# Patient Record
Sex: FEMALE | Race: WHITE | NOT HISPANIC OR LATINO | Employment: FULL TIME | URBAN - METROPOLITAN AREA
[De-identification: names, ages, dates, MRNs, and addresses within clinical notes are randomized per-mention and may not be internally consistent; named-entity substitution may affect disease eponyms.]

---

## 2023-07-26 PROBLEM — M54.2 CERVICAL PAIN: Status: ACTIVE | Noted: 2023-07-26

## 2023-07-26 PROBLEM — F17.200 CURRENT SMOKER: Status: ACTIVE | Noted: 2023-07-26

## 2023-07-26 PROBLEM — M47.812 CERVICAL ARTHRITIS: Status: ACTIVE | Noted: 2023-07-26

## 2023-07-26 PROBLEM — M54.12 ACUTE CERVICAL RADICULOPATHY: Status: ACTIVE | Noted: 2023-07-26

## 2023-07-26 PROBLEM — K11.8 MASS OF BOTH PAROTID GLANDS: Status: ACTIVE | Noted: 2023-07-26

## 2023-07-26 PROBLEM — D11.9 WARTHIN TUMOR: Status: ACTIVE | Noted: 2023-07-26

## 2023-07-26 NOTE — PROGRESS NOTES
Subjective   Patient ID: Tracey Carranza is a 57 y.o. female who presents for Establish Care (New pt).  HPI    Np   Ra   Hypothyroid, hld and gerd.     Review of Systems   Constitutional: Negative.    HENT: Negative.     Eyes: Negative.    Respiratory: Negative.     Cardiovascular: Negative.    Gastrointestinal: Negative.    Endocrine: Negative.    Musculoskeletal: Negative.    Skin: Negative.    Neurological: Negative.    Hematological: Negative.    Psychiatric/Behavioral: Negative.     All other systems reviewed and are negative.      Objective   Physical Exam  Vitals and nursing note reviewed.   Constitutional:       Appearance: Normal appearance.   HENT:      Head: Normocephalic and atraumatic.      Nose: Nose normal.   Eyes:      Conjunctiva/sclera: Conjunctivae normal.   Cardiovascular:      Rate and Rhythm: Normal rate and regular rhythm.   Pulmonary:      Effort: Pulmonary effort is normal.   Skin:     General: Skin is dry.   Neurological:      General: No focal deficit present.      Mental Status: She is alert and oriented to person, place, and time. Mental status is at baseline.   Psychiatric:         Mood and Affect: Mood normal.         Behavior: Behavior normal.         Assessment/Plan        Np  estPatient was identified as a fall risk. Risk prevention instructions provided.

## 2023-07-27 ENCOUNTER — OFFICE VISIT (OUTPATIENT)
Dept: PRIMARY CARE | Facility: CLINIC | Age: 58
End: 2023-07-27
Payer: MEDICARE

## 2023-07-27 VITALS
OXYGEN SATURATION: 97 % | BODY MASS INDEX: 36.09 KG/M2 | SYSTOLIC BLOOD PRESSURE: 156 MMHG | DIASTOLIC BLOOD PRESSURE: 77 MMHG | HEART RATE: 74 BPM | WEIGHT: 216.6 LBS | HEIGHT: 65 IN

## 2023-07-27 DIAGNOSIS — Z12.31 ENCOUNTER FOR SCREENING MAMMOGRAM FOR MALIGNANT NEOPLASM OF BREAST: ICD-10-CM

## 2023-07-27 DIAGNOSIS — Z13.6 SCREENING FOR CARDIOVASCULAR CONDITION: ICD-10-CM

## 2023-07-27 DIAGNOSIS — M04.9 AUTOINFLAMMATORY SYNDROME, UNSPECIFIED (MULTI): ICD-10-CM

## 2023-07-27 DIAGNOSIS — Z13.21 ENCOUNTER FOR VITAMIN DEFICIENCY SCREENING: ICD-10-CM

## 2023-07-27 DIAGNOSIS — Z00.00 HEALTHCARE MAINTENANCE: ICD-10-CM

## 2023-07-27 DIAGNOSIS — E03.9 HYPOTHYROIDISM, UNSPECIFIED TYPE: ICD-10-CM

## 2023-07-27 DIAGNOSIS — M06.9 RHEUMATOID ARTHRITIS, INVOLVING UNSPECIFIED SITE, UNSPECIFIED WHETHER RHEUMATOID FACTOR PRESENT (MULTI): Primary | ICD-10-CM

## 2023-07-27 DIAGNOSIS — E78.5 HYPERLIPIDEMIA, UNSPECIFIED HYPERLIPIDEMIA TYPE: ICD-10-CM

## 2023-07-27 PROBLEM — M72.2 PLANTAR FASCIITIS OF LEFT FOOT: Status: ACTIVE | Noted: 2019-02-24

## 2023-07-27 PROBLEM — K21.9 ESOPHAGEAL REFLUX: Status: ACTIVE | Noted: 2023-07-27

## 2023-07-27 PROBLEM — G57.11 MERALGIA PARESTHETICA OF RIGHT SIDE: Status: ACTIVE | Noted: 2019-03-13

## 2023-07-27 PROBLEM — E07.9 THYROID DISEASE: Status: ACTIVE | Noted: 2023-07-27

## 2023-07-27 PROCEDURE — 3008F BODY MASS INDEX DOCD: CPT | Performed by: INTERNAL MEDICINE

## 2023-07-27 PROCEDURE — 99204 OFFICE O/P NEW MOD 45 MIN: CPT | Performed by: INTERNAL MEDICINE

## 2023-07-27 RX ORDER — AMITRIPTYLINE HYDROCHLORIDE 10 MG/1
50 TABLET, FILM COATED ORAL NIGHTLY
Qty: 9 TABLET | Refills: 0
Start: 2023-07-27 | End: 2024-01-17 | Stop reason: WASHOUT

## 2023-07-27 RX ORDER — VIT C/E/ZN/COPPR/LUTEIN/ZEAXAN 250MG-90MG
25 CAPSULE ORAL DAILY
Qty: 30 CAPSULE | Refills: 11 | Status: SHIPPED | OUTPATIENT
Start: 2023-07-27 | End: 2023-07-27 | Stop reason: ALTCHOICE

## 2023-07-27 RX ORDER — ACETAMINOPHEN 500 MG
TABLET ORAL EVERY 6 HOURS PRN
COMMUNITY
End: 2024-02-13 | Stop reason: HOSPADM

## 2023-07-27 RX ORDER — PANTOPRAZOLE SODIUM 40 MG/1
1 TABLET, DELAYED RELEASE ORAL DAILY
COMMUNITY
End: 2023-07-27 | Stop reason: ALTCHOICE

## 2023-07-27 RX ORDER — PANTOPRAZOLE SODIUM 40 MG/1
40 TABLET, DELAYED RELEASE ORAL DAILY
COMMUNITY
End: 2023-07-27 | Stop reason: ALTCHOICE

## 2023-07-27 RX ORDER — SIMVASTATIN 40 MG/1
1 TABLET, FILM COATED ORAL DAILY
COMMUNITY
Start: 2007-05-07 | End: 2023-07-27 | Stop reason: ALTCHOICE

## 2023-07-27 RX ORDER — LEVOTHYROXINE SODIUM 88 UG/1
88 TABLET ORAL
Qty: 90 TABLET | Refills: 0 | Status: SHIPPED | OUTPATIENT
Start: 2023-07-27 | End: 2023-10-27 | Stop reason: SDUPTHER

## 2023-07-27 RX ORDER — LEVOTHYROXINE SODIUM 88 UG/1
TABLET ORAL EVERY 24 HOURS
COMMUNITY
End: 2023-07-27 | Stop reason: SDUPTHER

## 2023-07-27 RX ORDER — FOLIC ACID 1 MG/1
1 TABLET ORAL DAILY
COMMUNITY

## 2023-07-27 RX ORDER — AMITRIPTYLINE HYDROCHLORIDE 10 MG/1
TABLET, FILM COATED ORAL
COMMUNITY
End: 2023-07-27 | Stop reason: ALTCHOICE

## 2023-07-27 RX ORDER — CELECOXIB 200 MG/1
200 CAPSULE ORAL
COMMUNITY
Start: 2023-05-31 | End: 2024-01-22 | Stop reason: WASHOUT

## 2023-07-27 RX ORDER — ATORVASTATIN CALCIUM 40 MG/1
40 TABLET, FILM COATED ORAL DAILY
Qty: 90 TABLET | Refills: 1 | Status: SHIPPED | OUTPATIENT
Start: 2023-07-27 | End: 2023-10-27 | Stop reason: SDUPTHER

## 2023-07-27 RX ORDER — PANTOPRAZOLE SODIUM 40 MG/1
40 TABLET, DELAYED RELEASE ORAL DAILY
Qty: 90 TABLET | Refills: 0 | Status: SHIPPED | OUTPATIENT
Start: 2023-07-27 | End: 2023-09-21 | Stop reason: SDUPTHER

## 2023-07-27 RX ORDER — CHOLECALCIFEROL (VITAMIN D3) 50 MCG
100 TABLET ORAL DAILY
Qty: 60 TABLET | Refills: 11 | Status: SHIPPED | OUTPATIENT
Start: 2023-07-27 | End: 2024-07-26

## 2023-07-27 RX ORDER — METHOTREXATE 2.5 MG/1
2.5 TABLET ORAL
COMMUNITY

## 2023-07-27 ASSESSMENT — LIFESTYLE VARIABLES
HOW OFTEN DO YOU HAVE A DRINK CONTAINING ALCOHOL: MONTHLY OR LESS
HOW OFTEN DO YOU HAVE SIX OR MORE DRINKS ON ONE OCCASION: LESS THAN MONTHLY
HOW MANY STANDARD DRINKS CONTAINING ALCOHOL DO YOU HAVE ON A TYPICAL DAY: 1 OR 2
AUDIT-C TOTAL SCORE: 2
SKIP TO QUESTIONS 9-10: 0

## 2023-07-27 ASSESSMENT — ENCOUNTER SYMPTOMS
MUSCULOSKELETAL NEGATIVE: 1
EYES NEGATIVE: 1
CARDIOVASCULAR NEGATIVE: 1
RESPIRATORY NEGATIVE: 1
OCCASIONAL FEELINGS OF UNSTEADINESS: 0
GASTROINTESTINAL NEGATIVE: 1
ENDOCRINE NEGATIVE: 1
CONSTITUTIONAL NEGATIVE: 1
PSYCHIATRIC NEGATIVE: 1
LOSS OF SENSATION IN FEET: 1
NEUROLOGICAL NEGATIVE: 1
HEMATOLOGIC/LYMPHATIC NEGATIVE: 1
DEPRESSION: 1

## 2023-07-27 ASSESSMENT — PATIENT HEALTH QUESTIONNAIRE - PHQ9
SUM OF ALL RESPONSES TO PHQ9 QUESTIONS 1 AND 2: 2
1. LITTLE INTEREST OR PLEASURE IN DOING THINGS: SEVERAL DAYS
10. IF YOU CHECKED OFF ANY PROBLEMS, HOW DIFFICULT HAVE THESE PROBLEMS MADE IT FOR YOU TO DO YOUR WORK, TAKE CARE OF THINGS AT HOME, OR GET ALONG WITH OTHER PEOPLE: NOT DIFFICULT AT ALL
2. FEELING DOWN, DEPRESSED OR HOPELESS: SEVERAL DAYS

## 2023-07-27 NOTE — PATIENT INSTRUCTIONS

## 2023-09-21 DIAGNOSIS — E03.9 HYPOTHYROIDISM, UNSPECIFIED TYPE: ICD-10-CM

## 2023-09-21 DIAGNOSIS — Z00.00 HEALTHCARE MAINTENANCE: ICD-10-CM

## 2023-09-21 RX ORDER — PANTOPRAZOLE SODIUM 40 MG/1
40 TABLET, DELAYED RELEASE ORAL DAILY
Qty: 90 TABLET | Refills: 0 | Status: SHIPPED | OUTPATIENT
Start: 2023-09-21 | End: 2023-09-22 | Stop reason: SDUPTHER

## 2023-09-22 DIAGNOSIS — E03.9 HYPOTHYROIDISM, UNSPECIFIED TYPE: ICD-10-CM

## 2023-09-22 DIAGNOSIS — K21.9 GASTROESOPHAGEAL REFLUX DISEASE WITHOUT ESOPHAGITIS: Primary | ICD-10-CM

## 2023-09-22 DIAGNOSIS — Z00.00 HEALTHCARE MAINTENANCE: ICD-10-CM

## 2023-09-22 RX ORDER — PANTOPRAZOLE SODIUM 40 MG/1
40 TABLET, DELAYED RELEASE ORAL DAILY
Qty: 90 TABLET | Refills: 1 | Status: SHIPPED | OUTPATIENT
Start: 2023-09-22

## 2023-10-26 NOTE — PROGRESS NOTES
"Awv  rev  lab  Need mamm dexa    Subjective   Patient ID: Tracey Carranza is a 58 y.o. female who presents for AWV (AWV, ).  HPI    Awv  needs lab  mamm  dexa    Sinus inf   vertigo  and urinary symptoms recent.  No culture. She took left over pcn for teeth.     Ra sees   rheum  ccf.    Daily  smoking     Refuse mammo.  Refuse vaccines.       Below is the patient's most recent value for Albumin, ALT, AST, BUN, Calcium, Chloride, Cholesterol, CO2, Creatinine, GFR, Glucose, HDL, Hematocrit, Hemoglobin, Hemoglobin A1C, LDL, Magnesium, Phosphorus, Platelets, Potassium, PSA, Sodium, Triglycerides, and WBC.   Lab Results   Component Value Date    ALBUMIN 4.1 08/29/2022    ALT 11 08/29/2022    AST 11 08/29/2022    BUN 9 01/04/2023    CALCIUM 9.7 01/04/2023     01/04/2023    CHOL 176 02/11/2023    CO2 27 01/04/2023    CREATININE 0.86 01/04/2023    HDL 43 (L) 02/11/2023    HCT 40.8 01/04/2023    HGB 13.3 01/04/2023    MG 2.0 06/09/2022     01/04/2023    K 4.7 01/04/2023     01/04/2023    TRIG 108 02/11/2023    WBC 6.9 01/04/2023     Note: for a comprehensive list of the patient's lab results, access the Results Review activity.  Review of Systems   Constitutional: Negative.    All other systems reviewed and are negative.      Objective   BP Readings from Last 3 Encounters:   10/27/23 138/76   07/27/23 156/77   02/09/23 126/72      Wt Readings from Last 3 Encounters:   10/27/23 93.4 kg (206 lb)   07/27/23 98.2 kg (216 lb 9.6 oz)   02/09/23 102 kg (224 lb)      BMI: Estimated body mass index is 34.28 kg/m² as calculated from the following:    Height as of this encounter: 1.651 m (5' 5\").    Weight as of this encounter: 93.4 kg (206 lb).    BSA: Estimated body surface area is 2.07 meters squared as calculated from the following:    Height as of this encounter: 1.651 m (5' 5\").    Weight as of this encounter: 93.4 kg (206 lb).  Physical Exam  Vitals and nursing note reviewed.   Constitutional:       " Appearance: Normal appearance.   HENT:      Head: Normocephalic and atraumatic.      Nose: Nose normal.   Eyes:      Conjunctiva/sclera: Conjunctivae normal.   Cardiovascular:      Rate and Rhythm: Normal rate and regular rhythm.   Pulmonary:      Effort: Pulmonary effort is normal.   Skin:     General: Skin is dry.   Neurological:      General: No focal deficit present.      Mental Status: She is alert and oriented to person, place, and time. Mental status is at baseline.   Psychiatric:         Mood and Affect: Mood normal.         Behavior: Behavior normal.         Assessment/Plan   Problem List Items Addressed This Visit             ICD-10-CM       Medium    Lumbosacral spondylosis without myelopathy M47.817    Relevant Orders    Disability Placard    RA (rheumatoid arthritis) (CMS/[facility]) M06.9    Relevant Orders    Disability Placard     Other Visit Diagnoses         Codes    Routine general medical examination at health care facility    -  Primary Z00.00    Relevant Orders    1 Year Follow Up In Primary Care - Wellness Exam    IGT (impaired glucose tolerance)     R73.02    Relevant Orders    Hemoglobin A1C    Class 1 obesity due to excess calories with serious comorbidity and body mass index (BMI) of 34.0 to 34.9 in adult     E66.09, Z68.34    Screening for multiple conditions     Z13.89    Diabetes mellitus screening     Z13.1    Relevant Orders    Hemoglobin A1C    Screening for cardiovascular condition     Z13.6    Relevant Orders    Lipid panel    CT cardiac scoring wo IV contrast    Personal history of tobacco use, presenting hazards to health     Z87.891    Relevant Orders    CT lung screening low dose    Asymptomatic menopausal state     Z78.0    Relevant Orders    XR DEXA bone density    Encounter for screening mammogram for breast cancer     Z12.31    Relevant Orders    BI mammo bilateral screening tomosynthesis    Need for hepatitis C screening test     Z11.59    Relevant Orders    Hepatitis C antibody     Routine general medical examination at a health care facility     Z00.00    Screening for colorectal cancer     Z12.11, Z12.12    Relevant Orders    Cologuard® colon cancer screening    Hyperlipidemia, unspecified hyperlipidemia type     E78.5    Relevant Medications    atorvastatin (Lipitor) 40 mg tablet    Dysuria     R30.0    Relevant Orders    Urine Culture    Mammogram declined     Z53.20    Vaccination declined     Z28.21    Sinusitis, unspecified chronicity, unspecified location     J32.9    Relevant Medications    azithromycin (Zithromax) 250 mg tablet    Hypothyroidism, unspecified type     E03.9    Relevant Medications    levothyroxine (Synthroid, Levoxyl) 88 mcg tablet    Other Relevant Orders    T4, free

## 2023-10-27 ENCOUNTER — LAB (OUTPATIENT)
Dept: LAB | Facility: LAB | Age: 58
End: 2023-10-27
Payer: MEDICARE

## 2023-10-27 ENCOUNTER — OFFICE VISIT (OUTPATIENT)
Dept: PRIMARY CARE | Facility: CLINIC | Age: 58
End: 2023-10-27
Payer: MEDICARE

## 2023-10-27 VITALS
DIASTOLIC BLOOD PRESSURE: 76 MMHG | WEIGHT: 206 LBS | HEIGHT: 65 IN | HEART RATE: 91 BPM | BODY MASS INDEX: 34.32 KG/M2 | OXYGEN SATURATION: 98 % | SYSTOLIC BLOOD PRESSURE: 138 MMHG

## 2023-10-27 DIAGNOSIS — E03.9 HYPOTHYROIDISM, UNSPECIFIED TYPE: ICD-10-CM

## 2023-10-27 DIAGNOSIS — R73.02 IGT (IMPAIRED GLUCOSE TOLERANCE): ICD-10-CM

## 2023-10-27 DIAGNOSIS — Z13.1 DIABETES MELLITUS SCREENING: ICD-10-CM

## 2023-10-27 DIAGNOSIS — Z28.21 VACCINATION DECLINED: ICD-10-CM

## 2023-10-27 DIAGNOSIS — Z13.89 SCREENING FOR MULTIPLE CONDITIONS: ICD-10-CM

## 2023-10-27 DIAGNOSIS — Z13.6 SCREENING FOR CARDIOVASCULAR CONDITION: ICD-10-CM

## 2023-10-27 DIAGNOSIS — Z87.891 PERSONAL HISTORY OF TOBACCO USE, PRESENTING HAZARDS TO HEALTH: ICD-10-CM

## 2023-10-27 DIAGNOSIS — Z12.11 SCREENING FOR COLORECTAL CANCER: ICD-10-CM

## 2023-10-27 DIAGNOSIS — R30.0 DYSURIA: ICD-10-CM

## 2023-10-27 DIAGNOSIS — Z00.00 ROUTINE GENERAL MEDICAL EXAMINATION AT HEALTH CARE FACILITY: Primary | ICD-10-CM

## 2023-10-27 DIAGNOSIS — Z00.00 ROUTINE GENERAL MEDICAL EXAMINATION AT A HEALTH CARE FACILITY: ICD-10-CM

## 2023-10-27 DIAGNOSIS — Z12.12 SCREENING FOR COLORECTAL CANCER: ICD-10-CM

## 2023-10-27 DIAGNOSIS — Z53.20 MAMMOGRAM DECLINED: ICD-10-CM

## 2023-10-27 DIAGNOSIS — E78.5 HYPERLIPIDEMIA, UNSPECIFIED HYPERLIPIDEMIA TYPE: ICD-10-CM

## 2023-10-27 DIAGNOSIS — J32.9 SINUSITIS, UNSPECIFIED CHRONICITY, UNSPECIFIED LOCATION: ICD-10-CM

## 2023-10-27 DIAGNOSIS — Z11.59 NEED FOR HEPATITIS C SCREENING TEST: ICD-10-CM

## 2023-10-27 DIAGNOSIS — Z78.0 ASYMPTOMATIC MENOPAUSAL STATE: ICD-10-CM

## 2023-10-27 DIAGNOSIS — M47.817 LUMBOSACRAL SPONDYLOSIS WITHOUT MYELOPATHY: ICD-10-CM

## 2023-10-27 DIAGNOSIS — Z12.31 ENCOUNTER FOR SCREENING MAMMOGRAM FOR BREAST CANCER: ICD-10-CM

## 2023-10-27 DIAGNOSIS — E66.09 CLASS 1 OBESITY DUE TO EXCESS CALORIES WITH SERIOUS COMORBIDITY AND BODY MASS INDEX (BMI) OF 34.0 TO 34.9 IN ADULT: ICD-10-CM

## 2023-10-27 DIAGNOSIS — M06.9 RHEUMATOID ARTHRITIS, INVOLVING UNSPECIFIED SITE, UNSPECIFIED WHETHER RHEUMATOID FACTOR PRESENT (MULTI): ICD-10-CM

## 2023-10-27 LAB
CHOLEST SERPL-MCNC: 161 MG/DL (ref 0–199)
CHOLESTEROL/HDL RATIO: 3.9
EST. AVERAGE GLUCOSE BLD GHB EST-MCNC: 114 MG/DL
HBA1C MFR BLD: 5.6 %
HCV AB SER QL: NONREACTIVE
HDLC SERPL-MCNC: 41.3 MG/DL
LDLC SERPL CALC-MCNC: 98 MG/DL
NON HDL CHOLESTEROL: 120 MG/DL (ref 0–149)
T4 FREE SERPL-MCNC: 1.39 NG/DL (ref 0.78–1.48)
TRIGL SERPL-MCNC: 107 MG/DL (ref 0–149)
VLDL: 21 MG/DL (ref 0–40)

## 2023-10-27 PROCEDURE — 80061 LIPID PANEL: CPT

## 2023-10-27 PROCEDURE — 83036 HEMOGLOBIN GLYCOSYLATED A1C: CPT

## 2023-10-27 PROCEDURE — 4004F PT TOBACCO SCREEN RCVD TLK: CPT | Performed by: INTERNAL MEDICINE

## 2023-10-27 PROCEDURE — 84439 ASSAY OF FREE THYROXINE: CPT

## 2023-10-27 PROCEDURE — G0439 PPPS, SUBSEQ VISIT: HCPCS | Performed by: INTERNAL MEDICINE

## 2023-10-27 PROCEDURE — 99396 PREV VISIT EST AGE 40-64: CPT | Performed by: INTERNAL MEDICINE

## 2023-10-27 PROCEDURE — 3008F BODY MASS INDEX DOCD: CPT | Performed by: INTERNAL MEDICINE

## 2023-10-27 PROCEDURE — 86803 HEPATITIS C AB TEST: CPT

## 2023-10-27 PROCEDURE — 36415 COLL VENOUS BLD VENIPUNCTURE: CPT

## 2023-10-27 RX ORDER — AZITHROMYCIN 250 MG/1
TABLET, FILM COATED ORAL
Qty: 6 TABLET | Refills: 0 | Status: SHIPPED | OUTPATIENT
Start: 2023-10-27 | End: 2023-10-31

## 2023-10-27 RX ORDER — ATORVASTATIN CALCIUM 40 MG/1
40 TABLET, FILM COATED ORAL DAILY
Qty: 90 TABLET | Refills: 1 | Status: SHIPPED | OUTPATIENT
Start: 2023-10-27 | End: 2024-04-24

## 2023-10-27 RX ORDER — LEVOTHYROXINE SODIUM 88 UG/1
88 TABLET ORAL
Qty: 90 TABLET | Refills: 3 | Status: SHIPPED | OUTPATIENT
Start: 2023-10-27

## 2023-10-27 ASSESSMENT — ACTIVITIES OF DAILY LIVING (ADL)
MANAGING_FINANCES: INDEPENDENT
DOING_HOUSEWORK: INDEPENDENT
GROCERY_SHOPPING: INDEPENDENT
BATHING: INDEPENDENT
DRESSING: INDEPENDENT
TAKING_MEDICATION: INDEPENDENT

## 2023-10-27 ASSESSMENT — PATIENT HEALTH QUESTIONNAIRE - PHQ9
2. FEELING DOWN, DEPRESSED OR HOPELESS: NOT AT ALL
1. LITTLE INTEREST OR PLEASURE IN DOING THINGS: NOT AT ALL
SUM OF ALL RESPONSES TO PHQ9 QUESTIONS 1 AND 2: 0

## 2023-10-27 ASSESSMENT — ENCOUNTER SYMPTOMS
CONSTITUTIONAL NEGATIVE: 1
DEPRESSION: 0

## 2023-11-16 ENCOUNTER — ANCILLARY PROCEDURE (OUTPATIENT)
Dept: RADIOLOGY | Facility: CLINIC | Age: 58
End: 2023-11-16
Payer: MEDICARE

## 2023-11-16 DIAGNOSIS — Z78.0 ASYMPTOMATIC MENOPAUSAL STATE: ICD-10-CM

## 2023-11-16 DIAGNOSIS — Z87.891 PERSONAL HISTORY OF TOBACCO USE, PRESENTING HAZARDS TO HEALTH: ICD-10-CM

## 2023-11-16 PROCEDURE — 71271 CT THORAX LUNG CANCER SCR C-: CPT

## 2023-11-16 PROCEDURE — 77080 DXA BONE DENSITY AXIAL: CPT

## 2023-11-16 PROCEDURE — 77080 DXA BONE DENSITY AXIAL: CPT | Performed by: RADIOLOGY

## 2023-11-16 PROCEDURE — 71271 CT THORAX LUNG CANCER SCR C-: CPT | Performed by: RADIOLOGY

## 2023-11-17 LAB — NONINV COLON CA DNA+OCC BLD SCRN STL QL: POSITIVE

## 2023-11-27 DIAGNOSIS — Z12.11 COLON CANCER SCREENING: Primary | ICD-10-CM

## 2023-11-27 RX ORDER — POLYETHYLENE GLYCOL 3350, SODIUM CHLORIDE, SODIUM BICARBONATE AND POTASSIUM CHLORIDE 420; 5.72; 11.2; 1.48 G/4L; G/4L; G/4L; G/4L
4000 POWDER, FOR SOLUTION ORAL ONCE
Qty: 4000 ML | Refills: 0 | Status: SHIPPED | OUTPATIENT
Start: 2023-11-27 | End: 2023-11-27

## 2023-12-08 DIAGNOSIS — Z12.11 COLON CANCER SCREENING: Primary | ICD-10-CM

## 2023-12-08 RX ORDER — SOD SULF/POT CHLORIDE/MAG SULF 1.479 G
12 TABLET ORAL
Qty: 24 TABLET | Refills: 0 | Status: SHIPPED | OUTPATIENT
Start: 2023-12-08 | End: 2023-12-10

## 2023-12-13 ENCOUNTER — ANCILLARY PROCEDURE (OUTPATIENT)
Dept: RADIOLOGY | Facility: CLINIC | Age: 58
End: 2023-12-13
Payer: MEDICARE

## 2023-12-13 DIAGNOSIS — Z13.6 SCREENING FOR CARDIOVASCULAR CONDITION: ICD-10-CM

## 2023-12-13 PROCEDURE — 75571 CT HRT W/O DYE W/CA TEST: CPT

## 2023-12-19 NOTE — H&P
History Of Present Illness  Tracey Carranza is a 58 y.o. female presenting with positive cologuard.     Past Medical History  No past medical history on file.    Surgical History  No past surgical history on file.     Social History  She reports that she has been smoking cigarettes. She has never used smokeless tobacco. She reports current alcohol use. She reports that she does not currently use drugs.    Family History  No family history on file.     Allergies  Hydroxychloroquine    Review of Systems     Physical Exam  Vitals reviewed.   Cardiovascular:      Rate and Rhythm: Normal rate and regular rhythm.      Pulses: Normal pulses.      Heart sounds: Normal heart sounds.   Pulmonary:      Effort: Pulmonary effort is normal.      Breath sounds: Normal breath sounds.   Abdominal:      General: Abdomen is flat. Bowel sounds are normal.      Palpations: Abdomen is soft.          Last Recorded Vitals  There were no vitals taken for this visit.    Relevant Results              Assessment/Plan   Positive cologuard    Proceed with colonoscopy              Ricardo Schmitz MD

## 2023-12-20 ENCOUNTER — HOSPITAL ENCOUNTER (OUTPATIENT)
Dept: GASTROENTEROLOGY | Facility: HOSPITAL | Age: 58
Setting detail: OUTPATIENT SURGERY
Discharge: HOME | End: 2023-12-20
Payer: MEDICARE

## 2023-12-20 ENCOUNTER — ANESTHESIA EVENT (OUTPATIENT)
Dept: GASTROENTEROLOGY | Facility: HOSPITAL | Age: 58
End: 2023-12-20
Payer: MEDICARE

## 2023-12-20 ENCOUNTER — ANESTHESIA (OUTPATIENT)
Dept: GASTROENTEROLOGY | Facility: HOSPITAL | Age: 58
End: 2023-12-20
Payer: MEDICARE

## 2023-12-20 VITALS
BODY MASS INDEX: 32.88 KG/M2 | RESPIRATION RATE: 20 BRPM | SYSTOLIC BLOOD PRESSURE: 158 MMHG | HEART RATE: 72 BPM | OXYGEN SATURATION: 98 % | TEMPERATURE: 97.9 F | WEIGHT: 204.59 LBS | HEIGHT: 66 IN | DIASTOLIC BLOOD PRESSURE: 80 MMHG

## 2023-12-20 DIAGNOSIS — R19.5 POSITIVE COLORECTAL CANCER SCREENING USING COLOGUARD TEST: ICD-10-CM

## 2023-12-20 PROCEDURE — 3700000002 HC GENERAL ANESTHESIA TIME - EACH INCREMENTAL 1 MINUTE

## 2023-12-20 PROCEDURE — 2500000004 HC RX 250 GENERAL PHARMACY W/ HCPCS (ALT 636 FOR OP/ED): Performed by: ANESTHESIOLOGIST ASSISTANT

## 2023-12-20 PROCEDURE — 88341 IMHCHEM/IMCYTCHM EA ADD ANTB: CPT | Performed by: PATHOLOGY

## 2023-12-20 PROCEDURE — 88305 TISSUE EXAM BY PATHOLOGIST: CPT | Performed by: PATHOLOGY

## 2023-12-20 PROCEDURE — A45385 PR COLONOSCOPY,REMV LESN,SNARE: Performed by: STUDENT IN AN ORGANIZED HEALTH CARE EDUCATION/TRAINING PROGRAM

## 2023-12-20 PROCEDURE — 2500000004 HC RX 250 GENERAL PHARMACY W/ HCPCS (ALT 636 FOR OP/ED): Performed by: INTERNAL MEDICINE

## 2023-12-20 PROCEDURE — 88341 IMHCHEM/IMCYTCHM EA ADD ANTB: CPT | Mod: TC,AHULAB | Performed by: INTERNAL MEDICINE

## 2023-12-20 PROCEDURE — A45385 PR COLONOSCOPY,REMV LESN,SNARE: Performed by: ANESTHESIOLOGIST ASSISTANT

## 2023-12-20 PROCEDURE — 45385 COLONOSCOPY W/LESION REMOVAL: CPT | Performed by: INTERNAL MEDICINE

## 2023-12-20 PROCEDURE — 3700000001 HC GENERAL ANESTHESIA TIME - INITIAL BASE CHARGE

## 2023-12-20 PROCEDURE — 88342 IMHCHEM/IMCYTCHM 1ST ANTB: CPT | Performed by: PATHOLOGY

## 2023-12-20 PROCEDURE — 2500000005 HC RX 250 GENERAL PHARMACY W/O HCPCS: Performed by: ANESTHESIOLOGIST ASSISTANT

## 2023-12-20 PROCEDURE — 45380 COLONOSCOPY AND BIOPSY: CPT | Performed by: INTERNAL MEDICINE

## 2023-12-20 RX ORDER — SODIUM CHLORIDE, SODIUM LACTATE, POTASSIUM CHLORIDE, CALCIUM CHLORIDE 600; 310; 30; 20 MG/100ML; MG/100ML; MG/100ML; MG/100ML
100 INJECTION, SOLUTION INTRAVENOUS CONTINUOUS
Status: CANCELLED | OUTPATIENT
Start: 2023-12-20

## 2023-12-20 RX ORDER — ALBUTEROL SULFATE 0.83 MG/ML
2.5 SOLUTION RESPIRATORY (INHALATION) ONCE AS NEEDED
Status: CANCELLED | OUTPATIENT
Start: 2023-12-20

## 2023-12-20 RX ORDER — ONDANSETRON HYDROCHLORIDE 2 MG/ML
4 INJECTION, SOLUTION INTRAVENOUS ONCE AS NEEDED
Status: CANCELLED | OUTPATIENT
Start: 2023-12-20

## 2023-12-20 RX ORDER — HYDRALAZINE HYDROCHLORIDE 20 MG/ML
5 INJECTION INTRAMUSCULAR; INTRAVENOUS EVERY 30 MIN PRN
Status: CANCELLED | OUTPATIENT
Start: 2023-12-20

## 2023-12-20 RX ORDER — LABETALOL HYDROCHLORIDE 5 MG/ML
5 INJECTION, SOLUTION INTRAVENOUS ONCE AS NEEDED
Status: CANCELLED | OUTPATIENT
Start: 2023-12-20

## 2023-12-20 RX ORDER — LIDOCAINE HYDROCHLORIDE 20 MG/ML
INJECTION, SOLUTION INFILTRATION; PERINEURAL AS NEEDED
Status: DISCONTINUED | OUTPATIENT
Start: 2023-12-20 | End: 2023-12-20

## 2023-12-20 RX ORDER — ACETAMINOPHEN 325 MG/1
650 TABLET ORAL EVERY 4 HOURS PRN
Status: CANCELLED | OUTPATIENT
Start: 2023-12-20

## 2023-12-20 RX ORDER — SODIUM CHLORIDE, SODIUM LACTATE, POTASSIUM CHLORIDE, CALCIUM CHLORIDE 600; 310; 30; 20 MG/100ML; MG/100ML; MG/100ML; MG/100ML
20 INJECTION, SOLUTION INTRAVENOUS CONTINUOUS
Status: DISCONTINUED | OUTPATIENT
Start: 2023-12-20 | End: 2023-12-21 | Stop reason: HOSPADM

## 2023-12-20 RX ORDER — PROPOFOL 10 MG/ML
INJECTION, EMULSION INTRAVENOUS CONTINUOUS PRN
Status: DISCONTINUED | OUTPATIENT
Start: 2023-12-20 | End: 2023-12-20

## 2023-12-20 RX ORDER — MIDAZOLAM HYDROCHLORIDE 1 MG/ML
INJECTION, SOLUTION INTRAMUSCULAR; INTRAVENOUS AS NEEDED
Status: DISCONTINUED | OUTPATIENT
Start: 2023-12-20 | End: 2023-12-20

## 2023-12-20 RX ORDER — LIDOCAINE HYDROCHLORIDE 10 MG/ML
0.1 INJECTION, SOLUTION EPIDURAL; INFILTRATION; INTRACAUDAL; PERINEURAL ONCE
Status: CANCELLED | OUTPATIENT
Start: 2023-12-20 | End: 2023-12-20

## 2023-12-20 RX ADMIN — MIDAZOLAM HYDROCHLORIDE 2 MG: 1 INJECTION INTRAMUSCULAR; INTRAVENOUS at 08:40

## 2023-12-20 RX ADMIN — LIDOCAINE HYDROCHLORIDE 50 MG: 20 INJECTION, SOLUTION INFILTRATION; PERINEURAL at 08:45

## 2023-12-20 RX ADMIN — PROPOFOL 200 MCG/KG/MIN: 10 INJECTION, EMULSION INTRAVENOUS at 08:45

## 2023-12-20 RX ADMIN — SODIUM CHLORIDE, POTASSIUM CHLORIDE, SODIUM LACTATE AND CALCIUM CHLORIDE 20 ML/HR: 600; 310; 30; 20 INJECTION, SOLUTION INTRAVENOUS at 08:45

## 2023-12-20 RX ADMIN — SODIUM CHLORIDE, POTASSIUM CHLORIDE, SODIUM LACTATE AND CALCIUM CHLORIDE: 600; 310; 30; 20 INJECTION, SOLUTION INTRAVENOUS at 08:40

## 2023-12-20 SDOH — HEALTH STABILITY: MENTAL HEALTH: CURRENT SMOKER: 1

## 2023-12-20 ASSESSMENT — PAIN - FUNCTIONAL ASSESSMENT
PAIN_FUNCTIONAL_ASSESSMENT: 0-10

## 2023-12-20 ASSESSMENT — COLUMBIA-SUICIDE SEVERITY RATING SCALE - C-SSRS
6. HAVE YOU EVER DONE ANYTHING, STARTED TO DO ANYTHING, OR PREPARED TO DO ANYTHING TO END YOUR LIFE?: NO
1. IN THE PAST MONTH, HAVE YOU WISHED YOU WERE DEAD OR WISHED YOU COULD GO TO SLEEP AND NOT WAKE UP?: NO
2. HAVE YOU ACTUALLY HAD ANY THOUGHTS OF KILLING YOURSELF?: NO

## 2023-12-20 ASSESSMENT — PAIN SCALES - GENERAL
PAINLEVEL_OUTOF10: 0 - NO PAIN

## 2023-12-20 NOTE — DISCHARGE INSTRUCTIONS

## 2023-12-20 NOTE — ANESTHESIA PREPROCEDURE EVALUATION
"Patient: Tracey Carranza    Procedure Information       Date/Time: 12/20/23 0850    Scheduled providers: Ricardo Schmitz MD; Harshad Connelly MD; BETTYE Johns; Chris López RN    Procedure: COLONOSCOPY    Location: [facility]        .ALLERGIES:  Allergies   Allergen Reactions    Hydroxychloroquine Hives        MEDICAL HISTORY:  History reviewed. No pertinent past medical history.     [unfilled]     SURGICAL HISTORY:  History reviewed. No pertinent surgical history.     VITALS:      12/20/2023     8:25 AM 10/27/2023    10:36 AM 10/27/2023    10:04 AM   Vitals   Systolic 156 138 143   Diastolic 71 76 82   Heart Rate 103  91   Temp 36.2 °C (97.2 °F)     Resp 18     Height (in) 1.676 m (5' 6\")  1.651 m (5' 5\")   Weight (lb) 204.59  206   BMI 33.02 kg/m2  34.28 kg/m2   BSA (m2) 2.08 m2  2.07 m2   Visit Report  Report Report       LABS:   BMP   Lab Results   Component Value Date    GLUCOSE 97 01/04/2023    CALCIUM 9.7 01/04/2023     01/04/2023    K 4.7 01/04/2023    CO2 27 01/04/2023     01/04/2023    BUN 9 01/04/2023    CREATININE 0.86 01/04/2023   , CBC  Lab Results   Component Value Date    WBC 6.9 01/04/2023    HGB 13.3 01/04/2023    HCT 40.8 01/04/2023    MCV 91 01/04/2023     01/04/2023      SOCIAL:  Social History     Tobacco Use   Smoking Status Every Day    Types: Cigarettes   Smokeless Tobacco Never      Social History     Substance and Sexual Activity   Alcohol Use Yes      Social History     Substance and Sexual Activity   Drug Use Not Currently        NPO STATUS:  NPO/Void Status  Date of Last Liquid: 12/20/23  Time of Last Liquid: 0430  Date of Last Solid: 12/19/23  Time of Last Solid: 1700        Clinical Areas Reviewed:   Tobacco  Allergies  Meds   Med Hx  Surg Hx  OB Status  Fam Hx  Soc   Hx      PHYSICAL EXAM    Anesthesia Plan    ASA 3     MAC     The patient is a current smoker.  Patient was not previously instructed to abstain from smoking on day " of procedure.  Patient smoked on day of procedure.    intravenous induction   Anesthetic plan and risks discussed with patient and sibling.  Use of blood products discussed with patient who consented to blood products.    Plan discussed with CRNA and CAA.

## 2023-12-20 NOTE — ANESTHESIA POSTPROCEDURE EVALUATION
Patient: Tracey Carranza    Procedure Summary       Date: 12/20/23 Room / Location: [facility]    Anesthesia Start: 0840 Anesthesia Stop: 0916    Procedure: COLONOSCOPY Diagnosis: Positive colorectal cancer screening using Cologuard test    Scheduled Providers: Ricardo Schmitz MD; Harshad Connelly MD; BETTYE Johns; Chris López RN Responsible Provider: Harshad Connelly MD    Anesthesia Type: MAC ASA Status: 3            Anesthesia Type: MAC    Vitals Value Taken Time   /65 12/20/23 0925   Temp 36.2 12/20/23 0925   Pulse 75 12/20/23 0925   Resp 18 12/20/23 0925   SpO2 100 12/20/23 0925       Anesthesia Post Evaluation    Patient location during evaluation: PACU  Patient participation: complete - patient participated  Level of consciousness: awake  Pain management: adequate  Airway patency: patent  Cardiovascular status: acceptable  Respiratory status: acceptable  Hydration status: acceptable  Postoperative Nausea and Vomiting: none        No notable events documented.

## 2023-12-20 NOTE — SIGNIFICANT EVENT
Pt drinking fluids without nausea or vomiting.  Dr. Smith to the bedside.  Pt tearful  emotional support given

## 2023-12-20 NOTE — SIGNIFICANT EVENT
Pt received from procedure.  Placed on continuous cardiac / saO2 monitor.   Sister to the bedside.

## 2023-12-21 ASSESSMENT — PAIN SCALES - GENERAL: PAINLEVEL_OUTOF10: 0 - NO PAIN

## 2023-12-28 DIAGNOSIS — C20 RECTAL CANCER (MULTI): ICD-10-CM

## 2023-12-29 ENCOUNTER — LAB (OUTPATIENT)
Dept: LAB | Facility: LAB | Age: 58
End: 2023-12-29
Payer: MEDICARE

## 2023-12-29 DIAGNOSIS — C20 RECTAL CANCER (MULTI): ICD-10-CM

## 2023-12-29 LAB
ALBUMIN SERPL BCP-MCNC: 4.2 G/DL (ref 3.4–5)
ALP SERPL-CCNC: 102 U/L (ref 33–110)
ALT SERPL W P-5'-P-CCNC: 12 U/L (ref 7–45)
ANION GAP SERPL CALC-SCNC: 11 MMOL/L (ref 10–20)
AST SERPL W P-5'-P-CCNC: 9 U/L (ref 9–39)
BILIRUB SERPL-MCNC: 0.4 MG/DL (ref 0–1.2)
BUN SERPL-MCNC: 7 MG/DL (ref 6–23)
CALCIUM SERPL-MCNC: 9.6 MG/DL (ref 8.6–10.6)
CEA SERPL-MCNC: 0.9 UG/L
CHLORIDE SERPL-SCNC: 104 MMOL/L (ref 98–107)
CO2 SERPL-SCNC: 29 MMOL/L (ref 21–32)
CREAT SERPL-MCNC: 0.77 MG/DL (ref 0.5–1.05)
ERYTHROCYTE [DISTWIDTH] IN BLOOD BY AUTOMATED COUNT: 14.2 % (ref 11.5–14.5)
GFR SERPL CREATININE-BSD FRML MDRD: 89 ML/MIN/1.73M*2
GLUCOSE SERPL-MCNC: 101 MG/DL (ref 74–99)
HCT VFR BLD AUTO: 41.6 % (ref 36–46)
HGB BLD-MCNC: 13.7 G/DL (ref 12–16)
MCH RBC QN AUTO: 31.7 PG (ref 26–34)
MCHC RBC AUTO-ENTMCNC: 32.9 G/DL (ref 32–36)
MCV RBC AUTO: 96 FL (ref 80–100)
NRBC BLD-RTO: 0 /100 WBCS (ref 0–0)
PLATELET # BLD AUTO: 390 X10*3/UL (ref 150–450)
POTASSIUM SERPL-SCNC: 4.4 MMOL/L (ref 3.5–5.3)
PROT SERPL-MCNC: 6.7 G/DL (ref 6.4–8.2)
RBC # BLD AUTO: 4.32 X10*6/UL (ref 4–5.2)
SODIUM SERPL-SCNC: 140 MMOL/L (ref 136–145)
WBC # BLD AUTO: 7.9 X10*3/UL (ref 4.4–11.3)

## 2023-12-29 PROCEDURE — 80053 COMPREHEN METABOLIC PANEL: CPT

## 2023-12-29 PROCEDURE — 82378 CARCINOEMBRYONIC ANTIGEN: CPT

## 2023-12-29 PROCEDURE — 36415 COLL VENOUS BLD VENIPUNCTURE: CPT

## 2023-12-29 PROCEDURE — 85027 COMPLETE CBC AUTOMATED: CPT

## 2024-01-02 ENCOUNTER — ANCILLARY PROCEDURE (OUTPATIENT)
Dept: RADIOLOGY | Facility: CLINIC | Age: 59
End: 2024-01-02
Payer: MEDICARE

## 2024-01-02 DIAGNOSIS — C20 RECTAL CANCER (MULTI): Primary | ICD-10-CM

## 2024-01-02 PROCEDURE — 74177 CT ABD & PELVIS W/CONTRAST: CPT

## 2024-01-02 PROCEDURE — 2550000001 HC RX 255 CONTRASTS: Performed by: COLON & RECTAL SURGERY

## 2024-01-02 RX ADMIN — IOHEXOL 75 ML: 350 INJECTION, SOLUTION INTRAVENOUS at 09:19

## 2024-01-02 NOTE — H&P (VIEW-ONLY)
Tracey Carranza is a 58 year old female referred by Dr. Ricardo Schmitz for evaluation of rectal cancer.    She saw new PCP who ordered Cologuard. November 2023 Positive Cologuard.  Prior colonoscopy was approx 10 years ago. 1 polyp was removed. She lost her job and did not have health care for a while.     Denies any rectal bleeding, change in bowel habits. She did lose 19 lbs since May but she attributed that to starting Methotrexate (1 of the side effects). Usually has 1 BM daily Consistency loose to formed depending on what she eats.   She lives alone and takes care of her Mom and takes care of herself.    No children.    12/20/2023 Colonoscopy to TI  Impression  he terminal ileum appeared normal.  Subcentimeter polyp in the sigmoid colon was removed with cold snare  Single malignant-appearing and ulcerated mass measuring 4 cm in the mid rectum 13 cm from the anal verge, covering one third of the circumference; performed cold forceps biopsy  Small hemorrhoids  _   Pathology  A.  Rectum, biopsy:  - Moderately differentiated adenocarcinoma of colon.     B.  Sigmoid colon, polypectomy:  - Hyperplastic polyp.    12/29/2023 Laboratory:  CEA:  0.9  CBC:  WNL  CMP: WNL    01/02/2024 CT CHEST/ABD/PELVIS WITH CONTRAST  1. Equivocal wall thickening within the right lateral distal rectum measuring 5 mm. Correlate with site of patient's rectal carcinoma given the history. No stranding of the perirectal fat. No surrounding perirectal lymph node.      2. No lymphadenopathy in the chest, abdomen, or pelvis      3. Multiple pulmonary nodules demonstrated in the bilateral lung fields as seen on prior CT from 11/16/2023. Component of these pulmonary nodules are also demonstrated on remote CT from 07/12/2010. Attention on follow-up.    01/08/2024 MRI RECTUM:  IMPRESSION:  Rectal mass as detailed above is consistent with rectal neoplasm.  MRI based staging is:  MR-imaging based stage of rectal cancer is T1/2.  Based on MRI the  ana stage is N0.    MEDICAL HISTORY  HLD  Hypothryoidism  Rectal cancer  Angulo's esophagus-on protonix  Rheumatoid arthritis-Methotrexate  Parotid warthin's tumors     Surgical History  Left parotidectomy with facial nerve  dissection and preservation   Cervical neck surgery  Right shoulder surgery  Planter facitits left  Oral surgery x 2  Deviated septum  Metronic device in back then removed 17 months later    Social:  Smokin/2 PPD for 40+ years - Quit 2 years ago and now smoking.    ETOH: Couple of drinks a months  Recreational drugs: denies    Fam Hx:   Dad - CHF/cirrhosis - no EtOH  Mom - Dementia, RA  Sister - RA, anemia   Maunt - breast CA   Puncle - stomach CA  MGF - stomach CA    Constitutional: Well developed, awake/alert/oriented x3, no distress, alert and cooperative   Eyes: Sclera anicteric, no conjunctival inflammation, conjugate gaze   ENMT: mucous membranes moist, no apparent injury,   Head/Neck: Neck supple, no apparent injury, No JVD, trachea midline, no bruits   Respiratory/Thorax: Patent airways, CTAB, normal breath sounds with good chest expansion, thorax symmetric   Cardiovascular: Regular, rate and rhythm, no murmurs, normal S1 and S2   Gastrointestinal: Nondistended, soft, non-tender, no rebound tenderness or guarding, no masses palpable, no organomegaly, +BS, no bruits   Extremities: normal extremities, no cyanosis edema, contusions or wounds, 2+ femoral pulses B/L   Neurological: alert and oriented x3, normal strength, Normal gait   Lymphatic: No palpable inguinal lymphadenopathy   Psychological: Appropriate mood and behavior   Skin: Warm and dry, no lesions, no rashes   Anorectal: Normal tone, no external hemorrhoids or lesions, smooth mucosa, There was skin whitening in the posterior position - not anterior.      Flexible Sigmoidoscopy    Date/Time: 1/10/2024 11:45 AM    Performed by: Bryanna Presley MD  Authorized by: Bryanna Presley MD    Consent:     Consent obtained:   Verbal    Consent given by:  Patient    Risks, benefits, and alternatives were discussed: yes      Risks discussed:  Bleeding and pain  Universal protocol:     Procedure explained and questions answered to patient or proxy's satisfaction: yes      Relevant documents present and verified: yes      Test results available: yes      Imaging studies available: yes      Required blood products, implants, devices, and special equipment available: no      Site/side marked: no      Immediately prior to procedure, a time out was called: no      Patient identity confirmed:  Verbally with patient  Indications:     Indications:  Watch and wait rectal cancer  Sedation:     Sedation type:  None  Anesthesia:     Anesthesia method:  None  Procedure specific details:      The scope was inserted through the anus with ease to the sigmoid colon.  Preparation was excellent.  The mucosa appeared normal in the sigmoid.  There is a tumor at the top of the first rectal valve that is 3cm in size with central ulceration - I think anterior.       Post-procedure details:     Procedure completion:  Tolerated well, no immediate complications      Impression:  MT1/2N0 mid rectal cancer MSI-H    Had a >80 minute discussion with pt and her niece about how her treatment may change with the new treatment options in MSI H tumors     Stat genetics appt   Discussed TPC and J pouch - not a great option where she lives alone to maintain her independence  If no [place]ardo it would Robotic LAR with DLI and double stapled anastomosis - discussed risks LAR, Discussed the risks of leakage and need for a permanent stoma, infection, bleeding, injury to other organs, UTI, wound infection, blood clots, hernia, need for further operation, need for blood products and anesthetic complications. Urinary retention.  Nerve dysfunction.      Also discussed immunotherapy and pt and niece are interested in learning more   He will discuss her methotrexate with her physician at  ccf  Plan to present at tumor board for discussion

## 2024-01-03 DIAGNOSIS — C20 RECTAL CANCER (MULTI): ICD-10-CM

## 2024-01-03 LAB
LAB AP ASR DISCLAIMER: NORMAL
LABORATORY COMMENT REPORT: NORMAL
PATH REPORT.ADDENDUM SPEC: NORMAL
PATH REPORT.FINAL DX SPEC: NORMAL
PATH REPORT.GROSS SPEC: NORMAL
PATH REPORT.TOTAL CANCER: NORMAL

## 2024-01-05 ENCOUNTER — PREP FOR PROCEDURE (OUTPATIENT)
Dept: RADIOLOGY | Facility: HOSPITAL | Age: 59
End: 2024-01-05
Payer: MEDICARE

## 2024-01-08 ENCOUNTER — HOSPITAL ENCOUNTER (OUTPATIENT)
Dept: RADIOLOGY | Facility: HOSPITAL | Age: 59
Discharge: HOME | End: 2024-01-08
Payer: MEDICARE

## 2024-01-08 DIAGNOSIS — C20 RECTAL CANCER (MULTI): ICD-10-CM

## 2024-01-08 PROCEDURE — 72197 MRI PELVIS W/O & W/DYE: CPT | Performed by: RADIOLOGY

## 2024-01-08 PROCEDURE — 2500000004 HC RX 250 GENERAL PHARMACY W/ HCPCS (ALT 636 FOR OP/ED): Mod: JZ,JG | Performed by: STUDENT IN AN ORGANIZED HEALTH CARE EDUCATION/TRAINING PROGRAM

## 2024-01-08 PROCEDURE — 72197 MRI PELVIS W/O & W/DYE: CPT

## 2024-01-08 PROCEDURE — 2550000001 HC RX 255 CONTRASTS: Performed by: COLON & RECTAL SURGERY

## 2024-01-08 PROCEDURE — A9575 INJ GADOTERATE MEGLUMI 0.1ML: HCPCS | Performed by: COLON & RECTAL SURGERY

## 2024-01-08 RX ORDER — GADOTERATE MEGLUMINE 376.9 MG/ML
20 INJECTION INTRAVENOUS
Status: COMPLETED | OUTPATIENT
Start: 2024-01-08 | End: 2024-01-08

## 2024-01-08 RX ADMIN — GLUCAGON 1 MG: KIT at 14:00

## 2024-01-08 RX ADMIN — GADOTERATE MEGLUMINE 20 ML: 376.9 INJECTION INTRAVENOUS at 14:33

## 2024-01-10 ENCOUNTER — OFFICE VISIT (OUTPATIENT)
Dept: SURGERY | Facility: CLINIC | Age: 59
End: 2024-01-10
Payer: MEDICARE

## 2024-01-10 ENCOUNTER — TUMOR BOARD CONFERENCE (OUTPATIENT)
Dept: HEMATOLOGY/ONCOLOGY | Facility: HOSPITAL | Age: 59
End: 2024-01-10
Payer: MEDICARE

## 2024-01-10 VITALS
OXYGEN SATURATION: 100 % | HEART RATE: 91 BPM | WEIGHT: 201 LBS | DIASTOLIC BLOOD PRESSURE: 85 MMHG | TEMPERATURE: 97.2 F | BODY MASS INDEX: 32.44 KG/M2 | SYSTOLIC BLOOD PRESSURE: 170 MMHG

## 2024-01-10 DIAGNOSIS — C20 RECTAL CANCER (MULTI): Primary | ICD-10-CM

## 2024-01-10 DIAGNOSIS — C19: ICD-10-CM

## 2024-01-10 PROCEDURE — 45330 DIAGNOSTIC SIGMOIDOSCOPY: CPT | Performed by: COLON & RECTAL SURGERY

## 2024-01-10 PROCEDURE — 99215 OFFICE O/P EST HI 40 MIN: CPT | Performed by: COLON & RECTAL SURGERY

## 2024-01-10 PROCEDURE — 99205 OFFICE O/P NEW HI 60 MIN: CPT | Performed by: COLON & RECTAL SURGERY

## 2024-01-10 PROCEDURE — 3008F BODY MASS INDEX DOCD: CPT | Performed by: COLON & RECTAL SURGERY

## 2024-01-10 ASSESSMENT — PAIN SCALES - GENERAL: PAINLEVEL: 0-NO PAIN

## 2024-01-10 ASSESSMENT — ENCOUNTER SYMPTOMS: DEPRESSION: 0

## 2024-01-10 NOTE — TUMOR BOARD NOTE
Tumor Board Documentation    Tracey Carranza was presented by Dr. Presley at our Tumor Board on 1/10/2024, which included representatives from  .    Tracey currently presents as   with history of the following treatments:  .    Additionally, we reviewed previous medical and familial history, history of present illness, and recent lab results along with all available histopathologic and imaging studies. The tumor board considered available treatment options and made the following recommendations:       The following procedures/referrals were also placed: No orders of the defined types were placed in this encounter.        Clinical Trial Status:       National site-specific guidelines were discussed with respect to the case.       Background  H/o Rheumatoid Arthritis. ON methotrextate  Colorgard positive  No family history, 58 year old. Lives alone.    Rectal mass in low rectal around first valve.    MRI semi circumferential 2.1 cm mass. 10 cm from anorectal junction. T1-2. Hard to move.     Path  Invasive adenocarcinoma  Loss of MSH2 and MSH6    Recommend  No immunotherapy because of active autoimmune disease.    Plan for   Genetics referral  Low anterior resection and surveillance.

## 2024-01-11 ENCOUNTER — TELEPHONE (OUTPATIENT)
Dept: GASTROENTEROLOGY | Facility: HOSPITAL | Age: 59
End: 2024-01-11
Payer: MEDICARE

## 2024-01-11 ENCOUNTER — TELEPHONE (OUTPATIENT)
Dept: HEMATOLOGY/ONCOLOGY | Facility: CLINIC | Age: 59
End: 2024-01-11
Payer: MEDICARE

## 2024-01-11 DIAGNOSIS — C20 RECTAL CANCER (MULTI): Primary | ICD-10-CM

## 2024-01-11 NOTE — TELEPHONE ENCOUNTER
Called Tracey to let her know that Dr. Burton does not need to wait until after her genetics appointment to see her. Tracey stated that she spoke with Dr. Presley who did recommend that she have the genetics appointment before seeing Dr. Burton. No appointments rescheduled at this time.

## 2024-01-11 NOTE — TELEPHONE ENCOUNTER
Called the pt and her niece Xin and discussed that the tumor board recommendation is to not discuss immunotherapy due to her active RA.  The plan will be LAR with DLI.  We discussed a TPC with pouch, but with her current body habitus and independence that would be preventative surgery and possibly overkill.  Will try to bump up genetics appt.      Plan for robotic LAR 02/06 and case request sent.

## 2024-01-15 RX ORDER — METRONIDAZOLE 500 MG/100ML
500 INJECTION, SOLUTION INTRAVENOUS ONCE
Status: CANCELLED | OUTPATIENT
Start: 2024-01-15 | End: 2024-01-15

## 2024-01-15 RX ORDER — HEPARIN SODIUM 5000 [USP'U]/ML
5000 INJECTION, SOLUTION INTRAVENOUS; SUBCUTANEOUS ONCE
Status: CANCELLED | OUTPATIENT
Start: 2024-01-15 | End: 2024-01-15

## 2024-01-16 ENCOUNTER — TELEMEDICINE CLINICAL SUPPORT (OUTPATIENT)
Dept: GENETICS | Facility: CLINIC | Age: 59
End: 2024-01-16
Payer: MEDICARE

## 2024-01-16 ENCOUNTER — LAB (OUTPATIENT)
Dept: LAB | Facility: LAB | Age: 59
End: 2024-01-16
Payer: MEDICARE

## 2024-01-16 DIAGNOSIS — Z71.83 ENCOUNTER FOR NONPROCREATIVE GENETIC COUNSELING: ICD-10-CM

## 2024-01-16 DIAGNOSIS — C19: ICD-10-CM

## 2024-01-16 DIAGNOSIS — C20 RECTAL CANCER (MULTI): ICD-10-CM

## 2024-01-16 DIAGNOSIS — Z80.0 FAMILY HISTORY OF STOMACH CANCER: ICD-10-CM

## 2024-01-16 PROCEDURE — GENMD PR GENETICS VISIT (MEDICAID/MEDICARE)

## 2024-01-16 PROCEDURE — 36415 COLL VENOUS BLD VENIPUNCTURE: CPT

## 2024-01-16 NOTE — PROGRESS NOTES
"HISTORY OF PRESENT ILLNESS:  - Tracey Carranza is a 58 y.o. female with a recent diagnosis of rectal cancer that demonstrated abnormal immunohistochemistry staining.  - she was referred to the Cancer Genetics Clinic at [facility] by her physician, Dr. Bryanna Presley MD.   - Tracey is interested in genetic testing to clarify her personal risks for cancer, as well as the risks to her family members.   - The appointment today was conducted via telephone due to the current COVID-19 pandemic.     CANCER MEDICAL HISTORY:  Personal History of Cancer?    - Patient recently diagnosed with rectal cancer after an abnormal Cologuard test.   Cologuard was reported as abnormal on 11/10/2023, and then Ms. Carranza had a colonoscopy on 12/20/2023. The findings were as follows:  \"The terminal ileum appeared normal.;  One 4 mm sessile polyp in the sigmoid colon; performed cold snare removal  Single malignant-appearing and ulcerated mass (traversable) measuring 4 cm in the mid rectum 13 cm from the anal verge, covering one third of the circumference; performed cold forceps biopsy  Internal small hemorrhoids observed during retroflexion\"  Pathology showed a moderately differentiated adenocarcinoma of the colon on the rectal biopsy. Mismatch repair protein expression was reported in an addendum on the surgical pathology:  \"MISMATCH REPAIR PROTEIN EXPRESSION                       Protein:     Result                       MLH-1:          Expression Present                                                    PMS-2:          Expression Present                                            MSH-2:          Expression Absent                                                           MSH-6:          Expression Absent  Neoplasm with combined loss of MSH-2 and MSH-6 mismatch repair protein expression.  The loss of MSH-2 and MSH-6 protein expression has been identified (normal controls stain appropriately).     The above " "immunohistochemistry results, in the presence of a neoplasm strongly suggest the possibility of an inherited predisposition to neoplasia secondary to Guajardo Syndrome (HNPCC).  Appropriate clinical follow-up, including a clinical genetic consultation is strongly recommended.\"    Other ongoing health issues?  -Diagnosis of rheumatoid arthritis and hypothyroidism. Patient also has a diagnosis of Angulo's esophagus.     PREVIOUS GENETIC TESTING?  -None reported     CANCER SCREENING HISTORY:   Breast cancer screening:  Mammograms?   Patient had one mammogram at age 39; she reported that several masses were identified but additional imagining did not lead to high concern. This experience led to her never having another ultrasound  Other imaging?   Had \"2 lumps\" at age 39 that required additional imaging as described above  Clinical breast exams?  None reported  Breast biopsies?  None reported   Gynecologic cancer screening:  Hysterectomy?  No; history of a uterine ablation due to excessive bleeding in her 20s  Oophorectomy?  No  Vaginal ultrasound?   Possibly in her 20s around the time of the ablation, no concerns reported   CA-125?   None reported   PAP smear frequency/most recent?   Patient does not see gynecology regularly; has not had a PAP since her 20s. No abnormals reported   Gastrointestinal cancer screening:  Colonoscopy?   After recent Cologuard, patient had a colonoscopy in December 2023.   She reports that she has had several in her lifetime, and the most recent was greater than 10 years ago. One or two polyps have reportedly been removed previously. Records are not available for previous colonoscopy screening.   Endoscopy?  Patient reports the has had several, which were all also greater than 10 years ago. Diagnosed with Angulo's esophagus in her 30s.  Other cancer screening:   Dermatology?   None reported     REPRODUCTIVE HISTORY:  # Children:   - 0  # Pregnancies:   - 0   Age first birth:   -N/A  Breast " feeding?:   -N/A  Menarche (age):   -12  Menopause (age):   -Unclear; patient never had a period after ablation in her 20s  OCP:    -<1 year total around age 16   HRT:   -None reported      SOCIAL HISTORY:  - History of alcoholism, smoking, and work in a factory with many chemical exposures.     FAMILY HISTORY:  A 4-generation pedigree was obtained. The family history was significant for the following:  -Mother, brother, and sister are all still living and have no cancer history, however, patient stated that they do not go to the doctor or have any recommended routine cancer screenings.   -Maternal aunt (, 66)  of breast cancer that was initially diagnosed at age 64.   -Maternal grandfather (, 65)  of stomach cancer.   -Paternal uncle (, 75)  of stomach cancer.  -Paternal first cousin (son of paternal aunt)  of brain cancer.  Consanguinity and Ashkenazi Advent ancestry were denied. The patient's maternal side is of [place] descent, and the patient's paternal side is of Serbian descent.     GENETIC COUNSELING/DISCUSSION  Ms. Tracey Carranza is a 58 y.o. female with a recent diagnosis of colorectal cancer.  Based on the abnormal immunohistochemistry (absent MSH2 and MSH6), Tracey meets NCCN criteria for testing of the Guajardo syndrome genes. she is interested in testing, which is recommended, and was ordered today via the 21-gene hereditary colorectal cancers panel from [facility]. Our discussion is summarized below.    We reviewed genes and chromosomes, inherited forms of colon cancer, and the mismatch repair genes MLH1, MSH2, MSH6, PMS2, and EPCAM linked to Guajardo syndrome. We discussed that most cancers are not due to an inherited genetic susceptibility. However, in about 5-10% of families, there is an inherited genetic mutation that can make a person more susceptible to developing certain forms of cancer. Within these families, we often see multiple family members  with cancer, occurring in multiple generations. In addition, earlier onset and multiple cancers in the same patient are suggestive of an inherited form of cancer. Finally, there is a clustering of certain types of cancer in these families, such as colorectal, endometrial, stomach, and ovarian cancers.    Guajardo syndrome, also known as hereditary non-polyposis colorectal cancer (HNPCC), is a type of inherited cancer of the digestive tract. People who have Guajardo syndrome have a significantly increased risk of developing colorectal cancer and an increased risk of developing other types of cancers, such as endometrial (uterine), stomach, breast, ovarian, small bowel (intestinal), pancreatic, prostate, urinary tract, liver, kidney, and bile duct cancers. The most common cancers are colorectal, endometrial, stomach, and ovarian. Colorectal cancer risks for individuals with Guajardo syndrome can be as high as 52%, but some studies have shown an even higher risk for colorectal cancer. Endometrial cancer risk can be as high as 57%. Stomach cancer risk can be as high as 9%, and ovarian cancer risk can be as high as 38%. Cancer risks vary depending on the gene mutation in the family.    We discussed that there are multiple genes associated with increased colorectal cancer risk. Some genes are considered highly penetrant cancer genes, meaning a mutation in the gene confers a high risk of colorectal cancer. Other genes confer a high risk of polyposis syndomres. Additionally, there are other intermediate (moderate risk) cancer genes. For some of the moderate risk genes, there is often limited information regarding the degree to which a mutation in the gene affects risk of different types of cancers. Additionally, for some of these moderate risk genes, the appropriate management for individuals who have a mutation in one of these genes is not always clear. Our knowledge about the cancer risks associated with mutations in these  moderate risk genes is always growing, and we will likely be able to provide more comprehensive information in the future.     Guajardo syndrome, as well as most other cancer predisposition genes, is inherited in an autosomal dominant fashion. This means that if an individual has a change in one of these genes, their siblings and children have a 50% chance of also having that gene change and a 50% chance of not having the gene change.    Ms. Carranza already had a screening test for Guajardo syndrome, which came back abnormal. This screening involves looking to see if the Guajardo syndrome proteins were expressed in the cancer or not. MSH-2 and MSH-6 were not expressed which is an abnormal result. This increases the chance that Ms. Carranza has Guajardo syndrome. That screening has a 5-10% false negative rate. We briefly reviewed that if this genetic testing is negative, testing of the tumor (somatic genetic testing) for the mismatch repair genes should be considered.    We reviewed the three results we can get back:  1. Positive- Identified a change in a cancer gene that confers an increased cancer risk. We will discuss potential changes in management for her and her family based on the specific gene mutation found.  2. Negative- Clears her for a majority of predisposition cancer syndromes that we are aware of, but cannot clear her for any/all cancer predisposition risks. she would continue to be screened based on her personal and family history.  3. Variant of Uncertain Significance (VUS) - We discussed should an uncertain result come back that this would be treated like a negative result (i.e. no management recommendation will be made no familial variant testing) as the implications of this finding are currently unknown.    Tracey was counseled about hereditary cancer susceptibility including cancer risks, options for increased screening and/or risk reduction, genetic testing, and the implications for other family members. We  discussed performing genetic testing in the context of a multi-gene panel test that looks at the Guajardo Syndrome genes, as well as moderate penetrant genes. she is interested in this approach, which is recommended and was ordered today via the 21-gene hereditary colorectal cancers panel from [facility]:    Genes tested:  APC, AXIN2, BLM, BMPR1A, CHEK2, EPCAM, GREM1, MBD4, MLH1, MSH2, MSH3, MSH6, MUTYH, NTHL1, PMS2, POLD1, POLE, PTEN, SMAD4, STK11, TP53    Results are typically available within about one month of sample collection, and Tracey will return to the Cancer Genetics Clinic to discuss her testing results. At that time, we will make recommendations for both Tracey and her family members in terms of cancer screening and/or cancer risk reduction options.       PLAN:  Tracey Carranza elected to undergo genetic testing via a panel test that analyzes 21 genes associated with colorectal and other cancer risks. Consent for testing was obtained verbally and an order for a blood draw was placed; patient will go to the  outpatient lab of choice for sample collection soon.. The sample will be sent to [facility] for analysis.   Because these results may change how surgery for her cancer diagnosis is done, I will call her as soon as I have the results of this testing.   We remain available to Tracey and her family members at [phone] if any questions arise regarding information discussed at today's visit.    Time spent telecounselin min    Sincerely,   Melanie Parrish MS, [facility] [place]ensed Genetic Counselor     Reviewed by:  Dr. Yesika Valentine MD  Clinical

## 2024-01-17 ENCOUNTER — OFFICE VISIT (OUTPATIENT)
Dept: HEMATOLOGY/ONCOLOGY | Facility: CLINIC | Age: 59
End: 2024-01-17
Payer: MEDICARE

## 2024-01-17 VITALS
TEMPERATURE: 95.9 F | HEART RATE: 94 BPM | RESPIRATION RATE: 18 BRPM | SYSTOLIC BLOOD PRESSURE: 152 MMHG | BODY MASS INDEX: 33.85 KG/M2 | HEIGHT: 65 IN | OXYGEN SATURATION: 100 % | DIASTOLIC BLOOD PRESSURE: 85 MMHG | WEIGHT: 203.15 LBS

## 2024-01-17 DIAGNOSIS — C20 RECTAL ADENOCARCINOMA (MULTI): Primary | ICD-10-CM

## 2024-01-17 DIAGNOSIS — C20 RECTAL CANCER (MULTI): ICD-10-CM

## 2024-01-17 DIAGNOSIS — C19: ICD-10-CM

## 2024-01-17 PROCEDURE — 3008F BODY MASS INDEX DOCD: CPT | Performed by: STUDENT IN AN ORGANIZED HEALTH CARE EDUCATION/TRAINING PROGRAM

## 2024-01-17 PROCEDURE — 99205 OFFICE O/P NEW HI 60 MIN: CPT | Performed by: STUDENT IN AN ORGANIZED HEALTH CARE EDUCATION/TRAINING PROGRAM

## 2024-01-17 PROCEDURE — 99215 OFFICE O/P EST HI 40 MIN: CPT | Performed by: STUDENT IN AN ORGANIZED HEALTH CARE EDUCATION/TRAINING PROGRAM

## 2024-01-17 RX ORDER — BUPROPION HYDROCHLORIDE 150 MG/1
150 TABLET, EXTENDED RELEASE ORAL
COMMUNITY
End: 2024-01-25

## 2024-01-17 RX ORDER — AMITRIPTYLINE HYDROCHLORIDE 25 MG/1
1 TABLET, FILM COATED ORAL NIGHTLY
COMMUNITY
Start: 2023-12-10

## 2024-01-17 ASSESSMENT — PATIENT HEALTH QUESTIONNAIRE - PHQ9
SUM OF ALL RESPONSES TO PHQ9 QUESTIONS 1 AND 2: 2
2. FEELING DOWN, DEPRESSED OR HOPELESS: SEVERAL DAYS
1. LITTLE INTEREST OR PLEASURE IN DOING THINGS: SEVERAL DAYS
10. IF YOU CHECKED OFF ANY PROBLEMS, HOW DIFFICULT HAVE THESE PROBLEMS MADE IT FOR YOU TO DO YOUR WORK, TAKE CARE OF THINGS AT HOME, OR GET ALONG WITH OTHER PEOPLE: NOT DIFFICULT AT ALL

## 2024-01-17 ASSESSMENT — ENCOUNTER SYMPTOMS
LOSS OF SENSATION IN FEET: 1
OCCASIONAL FEELINGS OF UNSTEADINESS: 0
DEPRESSION: 1

## 2024-01-17 ASSESSMENT — COLUMBIA-SUICIDE SEVERITY RATING SCALE - C-SSRS
1. IN THE PAST MONTH, HAVE YOU WISHED YOU WERE DEAD OR WISHED YOU COULD GO TO SLEEP AND NOT WAKE UP?: NO
2. HAVE YOU ACTUALLY HAD ANY THOUGHTS OF KILLING YOURSELF?: NO
6. HAVE YOU EVER DONE ANYTHING, STARTED TO DO ANYTHING, OR PREPARED TO DO ANYTHING TO END YOUR LIFE?: NO

## 2024-01-17 NOTE — PROGRESS NOTES
[facility]   GI Medical Oncology Clinic  New Patient Visit     Patient Name: Tracey Carranza  MRN: [id number]  Date of Service: 1/17/2024  PCP: Marina Casper MD    Diagnosis: moderately differentiated adenocarcinoma of the rectum (T1/2N0M0, stage I)  MMR: Loss of MSH-2 and MSH-6 mismatch repair protein expression  CEA: 0.9 (12/29/2023)    HPI:    Ms. Tracey Carranza is a 57 yo F with PMH of RA on methotrexate, hypothyroidism, Angulo's esophagus (on Protonix), Parotid Warthin's tumors s/p resection, and newly diagnosed rectal cancer (stage 1, cT1-2N0M0) here today for medical oncology opinion.     Cologuard noted to be positive in November 2023. No rectal bleeding or change in bowel movements. Reports ~20 lbs weight loss since spring 2023. She had a prior colonoscopy about 10 years ago and 1 polyp was found and removed.     On 12/20/23, colonoscopy was done that showed normal terminal ileum, one 4mm sessile polyp in sigmoid colon (removed with cold snare, path - hyperplastic polyp), single malignant-appearing and ulcerated mass in mid rectum 13 cm from anal verge, covering 1/3 of circumference. Mass was transversable. Biopsy of mass revealed moderately differentiated adenocarcinoma of colon. Internal hemorrhoids also noted.     CT CAP on 1/2/2024 showed wall thickening within R lateral distal rectum measuring 5mm, no stranding of perirectal fat, no surrounding perirectal LN, no LAD in CAP, multiple pulmonary nodules in bilateral lung fields seen on prior CT 11/2023, component of pulmonary nodules are also demonstrated on remote CT from 7/2010.    MRI rectum 1/8/2024 showed semi circumferential wall thickening of rectal wall measuring up to 0.9cm wall thickening with mass measuring 2.1 in length, inferior aspect of lesion is 9.7cm from anorectal junction and 8.9cm from anal verge, no extention into perirectal fat, no extramural vascular invasion, no mesorectal fascia involved, no LAD, MR-imaging staged  as T1/2N0.    She was seen by Dr. Presley on 1/10/2024. Case discussed at rectal TB on 1/10/2024 and recommendation was genetics referral and LAR followed by surveillance. No immunotherapy due to active autoimmune disease.     Plan is for robotic LAR with Dr. Presley possibly on 2/6/24.    She met with genetics. [facility] 21 gene panel was ordered.     Ms. Carranza is here today with her niece, Xin. She feels well, but this has all been a lot for her mentally and emotionally to process. She has no symptoms of her cancer and wouldn't otherwise have known without the colonoscopy. She is worried about continuing to care for her mother following the surgery.      ROS:  10-pt ROS reviewed and negative except as mentioned above.     PMHx / FHx / PSHx: below was reviewed and is accurate  Past Medical History:   Diagnosis Date    Angulo esophagus     HLD (hyperlipidemia)     Hypothyroidism     Rectal adenocarcinoma (CMS/HCC)     Rheumatoid arthritis (CMS/HCC)     Warthin tumor      Past Surgical History:   Procedure Laterality Date    BACK SURGERY      Metronic device in back then removed 17 months later    CERVICAL SPINE SURGERY      MOUTH SURGERY      x2    NASAL SEPTUM SURGERY      PLANTAR FASCIA SURGERY Left     SALIVARY GLAND SURGERY Left     Left parotidectomy with facial nerve  dissection and preservation    SHOULDER SURGERY Right      Family History   Problem Relation Name Age of Onset    Rheum arthritis Mother      Dementia Mother      Heart failure Father      Cirrhosis Father          no ETOH    Rheum arthritis Sister      Breast cancer Mother's Sister      Stomach cancer Father's Brother      Stomach cancer Maternal Grandfather         Medications: below was reviewed and is accurate    Current Outpatient Medications:     acetaminophen (Tylenol) 500 mg tablet, Take by mouth every 6 hours if needed., Disp: , Rfl:     amitriptyline (Elavil) 25 mg tablet, Take 1 tablet (25 mg) by mouth once daily at bedtime., Disp:  ", Rfl:     atorvastatin (Lipitor) 40 mg tablet, Take 1 tablet (40 mg) by mouth once daily., Disp: 90 tablet, Rfl: 1    cholecalciferol (Vitamin D3) 50 MCG (2000 UT) tablet, Take 2 tablets (100 mcg) by mouth once daily., Disp: 60 tablet, Rfl: 11    folic acid (Folvite) 1 mg tablet, Take 1 tablet (1 mg) by mouth once daily., Disp: , Rfl:     levothyroxine (Synthroid, Levoxyl) 88 mcg tablet, Take 1 tablet (88 mcg) by mouth once daily in the morning. Take before meals., Disp: 90 tablet, Rfl: 3    methotrexate (Trexall) 2.5 mg tablet, TAKE 4 TABLETS BY MOUTH ONE TIME A WEEK, Disp: , Rfl:     pantoprazole (Protonix) 40 mg EC tablet, Take 1 tablet (40 mg) by mouth once daily., Disp: 90 tablet, Rfl: 1    buPROPion SR (Wellbutrin SR) 150 mg 12 hr tablet, Take 1 tablet (150 mg) by mouth., Disp: , Rfl:     celecoxib (CeleBREX) 200 mg capsule, Take 1 capsule (200 mg) by mouth once daily., Disp: , Rfl:     Physical exam:  Vitals:    01/17/24 1154   BP: 152/85   Pulse: 94   Resp: 18   Temp: 35.5 °C (95.9 °F)   SpO2: 100%   Weight: 92.1 kg (203 lb 2.5 oz)   Height: 1.652 m (5' 5.04\")   Performance status: ECOG 0  GEN: NAD, well-appearing  HEENT: AT/NC, EOMI intact. PERRLA. Oropharynx clear.  NECK: Supple, no JVD  LYMPH: No cervical or supraclavicular adenopathy  SKIN: skin color, texture, turgor normal, no suspicious rashes or lesions  LUNGS: normal respiratory effort, CTAB  CV: normal s1,s2 with rrr no mrg  ABD: Soft, no tenderness to palpation.    EXT: No deformities or edema. B/L DP pulses 2+  NEURO: Grossly intact. No focal deficits.  HEME: No signs of easy bruising or active bleeding.  PSYCH: Appropriate mood and affect    Radiology review:    CT CAP 1/2/2024  IMPRESSION:  1. Equivocal wall thickening within the right lateral distal rectum  measuring 5 mm. Correlate with site of patient's rectal carcinoma  given the history. No stranding of the perirectal fat. No surrounding  perirectal lymph node.      2. No lymphadenopathy " in the chest, abdomen, or pelvis      3. Multiple pulmonary nodules demonstrated in the bilateral lung  fields as seen on prior CT from 11/16/2023. Component of these  pulmonary nodules are also demonstrated on remote CT from 07/12/2010.  Attention on follow-up.    MRI rectum 1/8/2024  IMPRESSION:  1. Semi circumferential polypoidal mid rectal mass as detailed above  is consistent with rectal neoplasm. MR-imaging based stage of rectal  cancer is T1/2.  2. No enlarged mesorectal lymph nodes which meet size criteria. Based  on MRI the ana stage is N0.    Pathology review:    12/20/2023    FINAL DIAGNOSIS   A.  Rectum, biopsy:  - Moderately differentiated adenocarcinoma of colon.     B.  Sigmoid colon, polypectomy:  - Hyperplastic polyp.     MISMATCH REPAIR PROTEIN EXPRESSION                       Protein:     Result                       MLH-1:          Expression Present                                                    PMS-2:          Expression Present                                            MSH-2:          Expression Absent                                                           MSH-6:          Expression Absent  Neoplasm with combined loss of MSH-2 and MSH-6 mismatch repair protein expression.    Laboratory review:    CBC and CMP from 1/29/23 reviewed - wnl    ASSESSMENT AND PLAN:     Ms. Tracey Carranza is a 57 yo F with PMH of RA on methotrexate, hypothyroidism, Angulo's esophagus (on Protonix), Parotid Warthin's tumors s/p resection, and newly diagnosed rectal cancer (stage 1, cT1-2N0M0) here today for medical oncology opinion.     Discussed the diagnosis and stage of disease with Ms. Carranza and her niece. I reviewed the tumor board recommendations of surgery followed by surveillance. Due to her active RA requiring ongoing treatment with methotrexate, it was recommended she not receive immunotherapy. Surgery is planned for 2/6/24 with Dr. Presley.     She has already been seen by genetics. [facility]  panel for genetic testing was ordered and pending.     My contact information was provided to Ms. Carranza and her niece. I encouraged them to reach out with any further questions.      Mirella Burton MD  Staff, GI Medical Oncologist   1/17/2024

## 2024-01-17 NOTE — PROGRESS NOTES
Patient here for new patient visit accompanied by her niece Xin. Medications and allergies reviewed.     Patient was newly diagnosed with colon cancer. Surgery planned for 2/6 with Dr. Yane Presley.     Genetics counseling appointment completed on 1/16/24. Awaiting results.     Plan: Follow up PRN

## 2024-01-24 ENCOUNTER — APPOINTMENT (OUTPATIENT)
Dept: GENETICS | Facility: CLINIC | Age: 59
End: 2024-01-24
Payer: MEDICARE

## 2024-01-25 ENCOUNTER — TELEMEDICINE CLINICAL SUPPORT (OUTPATIENT)
Dept: PREADMISSION TESTING | Facility: HOSPITAL | Age: 59
End: 2024-01-25
Payer: MEDICARE

## 2024-01-25 ENCOUNTER — TELEPHONE (OUTPATIENT)
Dept: GENETICS | Facility: CLINIC | Age: 59
End: 2024-01-25
Payer: MEDICARE

## 2024-01-25 DIAGNOSIS — C20 RECTAL CANCER (MULTI): ICD-10-CM

## 2024-01-25 NOTE — TELEPHONE ENCOUNTER
Ms. Tracey Carranza is a 58 y.o. female with a personal history of colom cancer. Tracey was initially seen in the [facility] on 01/16/2024 for counseling and coordination of genetic testing. Based on her personal history of cancer, the 21-gene hereditary colorectal cancers panel from [employer] was ordered. I called her to review the results of this testing, and our discussion is summarized below.    Tracey Carranza's results were NEGATIVE, meaning that no disease causing mutations were identified. A genetic cause for her personal history of cancer has not been identified.    Genes tested:  APC, AXIN2, BLM, BMPR1A, CHEK2, EPCAM, GREM1, MBD4, MLH1, MSH2, MSH3, MSH6, MUTYH, NTHL1, PMS2, POLD1, POLE, PTEN, SMAD4, STK11, TP53  Please see linked media for a copy of the full report, including all genes screened for.    Ms. Carranza has colorectal cancer which had loss of expression of MSH2/MSH6 proteins by immunohistochemistry staining (IHC). This protein staining pattern could be suggestive of Guajardo syndrome, and therefore, germline testing was ordered. Germline testing failed to identify a germline mutation - reducing, but not eliminating the likelihood of Guajardo syndrome.    The patient's abnormal IHC results and negative germline results indicate one of the two possibilities:  The patient has Guajardo syndrome and a germline mutation exists in one of the mismatch repair genes that could not be identified by testing methodologies.  The patient does NOT have Guajardo syndrome and biallelic somatic mismatch repair mutations occurred in the patient's tumor.    Recent studies by Gucci et al, and Dylan et al have shown that the finding of two different somatic mismatch repair (MMR) gene mutations (MSH2, MSH6, EPCAM) in the tumor itself explain abnormal IHC results in over 50% of the MMR-deficient tumors without germline mutations. Individuals with 2 somatic MMR mutations do not have Guajardo syndrome. The  latest NCCN guidelines suggest tumor testing, to look for somatic MMR mutations, in patients with abnormal IHC testing not explained by a germline mutation or hypermethylation of MLH1, such as Ms. Carranza. If biallelic MMR mutations were identified in this patient's tumor, then we would know that the patient does NOT have Guajardo syndrome.    Until we can further determine whether the patient has Guajardo syndrome or not, it is recommended that the patient and their close family members have cancer screening and risk reduction, as though they have Guajardo syndrome. This includes GI cancer screening through colonoscopy and endoscopy, as well as consideration of gynecologic cancer screening.     We discussed that [facility] performs sequencing and large rearrangement analysis of MMR genes in tumor DNA. If this test were to identify 2 mutations in an MMR gene that explains the abnormal immunohistochemistry, we can rule out Guajardo syndrome risk and clear Ms. Carranza and her family members from additional surveillance. If 2 mutations in an MMR gene are NOT identified to explain the abnormal IHC, additional cancer screening would be recommended for herself and her family members. This is especially applicable to her niece, who Ms. Carranza expressed significant concern about in regards to cancer risk.     Although her results were negative, Tracey's relatives are still considered to have an increased risk to develop colon cancer over the general population, based on their family history alone. Having a single affected close relative with colorectal cancer increases the risk approximately twofold over that of the general population (general population risk is 4.2%). Per NCCN guidelines, individuals who have a family history of colon cancer should get a colonoscopy beginning at age 40 years or 10 years before the earliest diagnosis of colon cancer. Colonoscopies should be repeated every 5 years, or if positive,  should be repeated per the findings of the colonoscopy. Tracey's relatives, including her siblings and her niece, should speak to their healthcare providers about their colon cancer screening protocols.     Tracey should continue to follow the recommendations of her physicians regarding ongoing treatment and follow-up/surveillance.    Plan:  - I will mail Tracey a copy of her test report and the above documentation.  -Tracey stated that she understood the importance of tumor testing, however, declined at this time as the additional information has been overwhelming in her cancer diagnostic journey. She stated that she may reconsider in the future after her surgery, and would like Dr. Presley's input on recommendations, too.   - Our understanding of genetic contribution to cancer diagnoses is always evolving, so there may be additional testing recommended in the future. she can contact us as needed to discuss additional testing options in the future.    Time spent telecounselin min    Sincerely,   Melanie Parrish MS, [facility]  Licensed Genetic Counselor      Reviewed by:  Dr. Yesika Valentine MD  Clinical

## 2024-02-01 ENCOUNTER — HOSPITAL ENCOUNTER (OUTPATIENT)
Dept: CARDIOLOGY | Facility: HOSPITAL | Age: 59
Discharge: HOME | End: 2024-02-01
Payer: MEDICARE

## 2024-02-01 ENCOUNTER — LAB (OUTPATIENT)
Dept: LAB | Facility: LAB | Age: 59
End: 2024-02-01
Payer: MEDICARE

## 2024-02-01 ENCOUNTER — PRE-ADMISSION TESTING (OUTPATIENT)
Dept: PREADMISSION TESTING | Facility: HOSPITAL | Age: 59
End: 2024-02-01
Payer: MEDICARE

## 2024-02-01 VITALS
BODY MASS INDEX: 31.89 KG/M2 | OXYGEN SATURATION: 100 % | SYSTOLIC BLOOD PRESSURE: 132 MMHG | HEART RATE: 100 BPM | DIASTOLIC BLOOD PRESSURE: 71 MMHG | TEMPERATURE: 95.9 F | HEIGHT: 66 IN | RESPIRATION RATE: 18 BRPM | WEIGHT: 198.41 LBS

## 2024-02-01 DIAGNOSIS — C20 RECTAL ADENOCARCINOMA (MULTI): ICD-10-CM

## 2024-02-01 DIAGNOSIS — C20 RECTAL ADENOCARCINOMA (MULTI): Primary | ICD-10-CM

## 2024-02-01 DIAGNOSIS — Z01.818 PREPROCEDURAL EXAMINATION: ICD-10-CM

## 2024-02-01 LAB
ABO GROUP (TYPE) IN BLOOD: NORMAL
ALBUMIN SERPL BCP-MCNC: 3.9 G/DL (ref 3.4–5)
ALP SERPL-CCNC: 100 U/L (ref 33–110)
ALT SERPL W P-5'-P-CCNC: 11 U/L (ref 7–45)
ANION GAP SERPL CALC-SCNC: 14 MMOL/L (ref 10–20)
ANTIBODY SCREEN: NORMAL
AST SERPL W P-5'-P-CCNC: 11 U/L (ref 9–39)
BASOPHILS # BLD AUTO: 0.07 X10*3/UL (ref 0–0.1)
BASOPHILS NFR BLD AUTO: 1 %
BILIRUB SERPL-MCNC: 0.4 MG/DL (ref 0–1.2)
BUN SERPL-MCNC: 8 MG/DL (ref 6–23)
CALCIUM SERPL-MCNC: 9.3 MG/DL (ref 8.6–10.3)
CHLORIDE SERPL-SCNC: 103 MMOL/L (ref 98–107)
CO2 SERPL-SCNC: 25 MMOL/L (ref 21–32)
CREAT SERPL-MCNC: 0.82 MG/DL (ref 0.5–1.05)
EGFRCR SERPLBLD CKD-EPI 2021: 83 ML/MIN/1.73M*2
EOSINOPHIL # BLD AUTO: 0.13 X10*3/UL (ref 0–0.7)
EOSINOPHIL NFR BLD AUTO: 1.8 %
ERYTHROCYTE [DISTWIDTH] IN BLOOD BY AUTOMATED COUNT: 14.2 % (ref 11.5–14.5)
GLUCOSE SERPL-MCNC: 106 MG/DL (ref 74–99)
HCT VFR BLD AUTO: 42.7 % (ref 36–46)
HGB BLD-MCNC: 13.8 G/DL (ref 12–16)
IMM GRANULOCYTES # BLD AUTO: 0.03 X10*3/UL (ref 0–0.7)
IMM GRANULOCYTES NFR BLD AUTO: 0.4 % (ref 0–0.9)
LAB MOLECULAR CA TECHNICAL NOTES: NORMAL
LYMPHOCYTES # BLD AUTO: 1.92 X10*3/UL (ref 1.2–4.8)
LYMPHOCYTES NFR BLD AUTO: 26.6 %
MCH RBC QN AUTO: 30.5 PG (ref 26–34)
MCHC RBC AUTO-ENTMCNC: 32.3 G/DL (ref 32–36)
MCV RBC AUTO: 94 FL (ref 80–100)
MONOCYTES # BLD AUTO: 0.77 X10*3/UL (ref 0.1–1)
MONOCYTES NFR BLD AUTO: 10.7 %
NEUTROPHILS # BLD AUTO: 4.29 X10*3/UL (ref 1.2–7.7)
NEUTROPHILS NFR BLD AUTO: 59.5 %
NRBC BLD-RTO: 0 /100 WBCS (ref 0–0)
PLATELET # BLD AUTO: 454 X10*3/UL (ref 150–450)
POTASSIUM SERPL-SCNC: 4.5 MMOL/L (ref 3.5–5.3)
PROT SERPL-MCNC: 7.2 G/DL (ref 6.4–8.2)
RBC # BLD AUTO: 4.53 X10*6/UL (ref 4–5.2)
RH FACTOR (ANTIGEN D): NORMAL
SCAN RESULT: NORMAL
SODIUM SERPL-SCNC: 137 MMOL/L (ref 136–145)
WBC # BLD AUTO: 7.2 X10*3/UL (ref 4.4–11.3)

## 2024-02-01 PROCEDURE — 86900 BLOOD TYPING SEROLOGIC ABO: CPT

## 2024-02-01 PROCEDURE — 36415 COLL VENOUS BLD VENIPUNCTURE: CPT

## 2024-02-01 PROCEDURE — 99214 OFFICE O/P EST MOD 30 MIN: CPT | Performed by: NURSE PRACTITIONER

## 2024-02-01 PROCEDURE — 86850 RBC ANTIBODY SCREEN: CPT

## 2024-02-01 PROCEDURE — 87081 CULTURE SCREEN ONLY: CPT | Mod: AHULAB | Performed by: NURSE PRACTITIONER

## 2024-02-01 PROCEDURE — 80053 COMPREHEN METABOLIC PANEL: CPT

## 2024-02-01 PROCEDURE — 93005 ELECTROCARDIOGRAM TRACING: CPT

## 2024-02-01 PROCEDURE — 85025 COMPLETE CBC W/AUTO DIFF WBC: CPT

## 2024-02-01 PROCEDURE — 86901 BLOOD TYPING SEROLOGIC RH(D): CPT

## 2024-02-01 RX ORDER — METRONIDAZOLE 250 MG/1
TABLET ORAL
Qty: 3 TABLET | Refills: 0 | Status: ON HOLD | OUTPATIENT
Start: 2024-02-01 | End: 2024-02-06 | Stop reason: ALTCHOICE

## 2024-02-01 RX ORDER — GABAPENTIN 100 MG/1
CAPSULE ORAL
Qty: 3 CAPSULE | Refills: 0 | Status: ON HOLD | OUTPATIENT
Start: 2024-02-01 | End: 2024-02-06 | Stop reason: ALTCHOICE

## 2024-02-01 RX ORDER — NEOMYCIN SULFATE 500 MG/1
TABLET ORAL
Qty: 6 TABLET | Refills: 0 | Status: ON HOLD | OUTPATIENT
Start: 2024-02-01 | End: 2024-02-06 | Stop reason: ALTCHOICE

## 2024-02-01 RX ORDER — CHLORHEXIDINE GLUCONATE ORAL RINSE 1.2 MG/ML
15 SOLUTION DENTAL DAILY
Qty: 30 ML | Refills: 0 | Status: ON HOLD | OUTPATIENT
Start: 2024-02-01 | End: 2024-02-06 | Stop reason: ALTCHOICE

## 2024-02-01 ASSESSMENT — ENCOUNTER SYMPTOMS
NECK NEGATIVE: 1
CARDIOVASCULAR NEGATIVE: 1
GASTROINTESTINAL NEGATIVE: 1
CONSTITUTIONAL NEGATIVE: 1
COUGH: 1
ARTHRALGIAS: 1

## 2024-02-01 NOTE — PREPROCEDURE INSTRUCTIONS
Medication List            Accurate as of February 1, 2024 10:33 AM. Always use your most recent med list.                acetaminophen 500 mg tablet  Commonly known as: Tylenol  Notes to patient: TAKE IF NEEDED      amitriptyline 25 mg tablet  Commonly known as: Elavil  Medication Adjustments for Surgery: Take morning of surgery with sip of water, no other fluids     atorvastatin 40 mg tablet  Commonly known as: Lipitor  Take 1 tablet (40 mg) by mouth once daily.  Medication Adjustments for Surgery: Take morning of surgery with sip of water, no other fluids     chlorhexidine 0.12 % solution  Commonly known as: Peridex  Use 15 mL in the mouth or throat once daily for 2 days.  Notes to patient: USE as DIRECTED      cholecalciferol 50 MCG (2000 UT) tablet  Commonly known as: Vitamin D3  Take 2 tablets (100 mcg) by mouth once daily.  Medication Adjustments for Surgery: Continue until night before surgery     folic acid 1 mg tablet  Commonly known as: Folvite  Medication Adjustments for Surgery: Continue until night before surgery     levothyroxine 88 mcg tablet  Commonly known as: Synthroid, Levoxyl  Take 1 tablet (88 mcg) by mouth once daily in the morning. Take before meals.  Medication Adjustments for Surgery: Take morning of surgery with sip of water, no other fluids     methotrexate 2.5 mg tablet  Commonly known as: Trexall  Medication Adjustments for Surgery: Stop 7 days before surgery     pantoprazole 40 mg EC tablet  Commonly known as: Protonix  Take 1 tablet (40 mg) by mouth once daily.  Medication Adjustments for Surgery: Take morning of surgery with sip of water, no other fluids                      CONTACT SURGEON'S OFFICE IF YOU DEVELOP:  * Fever = 100.4 F   * New respiratory symptoms (e.g. cough, shortness of breath, respiratory distress, sore throat)  * Recent loss of taste or smell  *Flu like symptoms such as headache, fatigue or gastrointestinal symptoms  * You develop any open sores, shingles,  burning or painful urination   AND/OR:  * You no longer wish to have the surgery.  * Any other personal circumstances change that may lead to the need to cancel or defer this surgery.  *You were admitted to any hospital within one week of your planned procedure.    SMOKING:  *Quitting smoking can make a huge difference to your health and recovery from surgery.    *If you need help with quitting, call 1-158-QUIT-NOW.    THE DAY BEFORE SURGERY:  *Do not eat any food after midnight the night before surgery.   *You are permitted to drink clear liquids (i.e. water, black coffee, tea, clear broth, apple juice) up to 2 hours before your surgery.  DIABETICS:  Please check fasting blood sugar  upon waking up.  If fasting sugar is <80 mg/dl, please drink 100ml/3oz of apple juice no later than 2 hours prior to surgery.      SURGICAL TIME  *You will be contacted between 2 p.m. and 6 p.m. the business day before your surgery with your arrival time.  *If you haven't received a call by 6pm, call [phone].  *Scheduled surgery times may change and you will be notified if this occurs-check your personal voicemail for any updates.    ON THE MORNING OF SURGERY:  *Wear comfortable, loose fitting clothing.   *Do not use moisturizers, creams, lotions or perfume.  *All jewelry and valuables should be left at home.  *Prosthetic devices such as contact lenses, hearing aids, dentures, eyelash extensions, hairpins and body piercing must be removed before surgery.    BRING WITH YOU:  *Photo ID and insurance card  *Current list of medicines and allergies  *Pacemaker/Defibrillator/Heart stent cards  *CPAP machine and mask  *Slings/splints/crutches  *Copy of your complete Advanced Directive/DHPOA-if applicable  *Neurostimulator implant remote    PARKING AND ARRIVAL:  *Check in at the Main Entrance desk and let them know you are here for surgery.  *You will be directed to the 2nd floor surgical waiting area.    AFTER OUTPATIENT SURGERY:  *A  responsible adult MUST accompany you at the time of discharge and stay with you for 24 hours after your surgery.  *You may NOT drive yourself home after surgery.  *You may use a taxi or ride sharing service ([employer], Uber) to return home ONLY if you are accompanied by a friend or family member.  *Instructions for resuming your medications will be provided by your surgeon.    YOU HAVE REVIEWED THE MEDICATIONS ON THIS SHEET AND YOU VERIFY THESE ARE ALL THE MEDICATIONS AND OVER THE COUNTER MEDICATIONS THAT YOU TAKE .

## 2024-02-01 NOTE — CPM/PAT H&P
CPM/PAT Evaluation       Name: Tracey Carranza (Tracey Carranza)  /Age: 1965/58 y.o.     SURGEON :DR PASTOR ROCHE     Surgery, Date, and Length:  Robot Assisted Laparoscopic Sigmoid Colon and Rectum Resection , 24    HPI:  This a 58y.o. female who presents for presurgical evaluation for  for above mentioned procedure . Pt had  a positive Cologuard. She was referred for colonoscopy and was found to have a polyp. Pathology positive for moderately differentiated adenocarcinoma . After discussion of the risks and benefits with Dr. Roche the patient elects to proceed with the planned procedure.       Past Medical History:   Diagnosis Date    Angulo esophagus     HLD (hyperlipidemia)     Hypothyroidism     Rectal adenocarcinoma (CMS/HCC)     Rheumatoid arthritis (CMS/HCC)     Warthin tumor        Past Surgical History:   Procedure Laterality Date    BACK SURGERY      Metronic device in back then removed 17 months later    CERVICAL SPINE SURGERY      MOUTH SURGERY      x2    NASAL SEPTUM SURGERY      PLANTAR FASCIA SURGERY Left     SALIVARY GLAND SURGERY Left     Left parotidectomy with facial nerve  dissection and preservation    SHOULDER SURGERY Right      Anesthesia History  Pt denies any past history of anesthetic complications such as PONV, awareness, prolonged sedation, dental damage, aspiration, cardiac arrest, difficult intubation, difficult I.V. access or unexpected hospital admissions.  NO malignant hyperthermia and or pseudo cholinesterase deficiency.    The patient is not  a Restorationist and will accept blood and blood products if medically indicated.   No history of blood transfusions .Type and screen  sent.     Social History  Social History     Substance and Sexual Activity   Drug Use Never      Social History     Substance and Sexual Activity   Alcohol Use Not Currently    Comment: couple drinks/month      Social History     Tobacco Use   Smoking Status Every Day    Packs/day: 0.25     Years: 40.00    Additional pack years: 0.00    Total pack years: 10.00    Types: Cigarettes    Passive exposure: Never   Smokeless Tobacco Never   Tobacco Comments    Actively trying to quit           Family History   Problem Relation Name Age of Onset    Rheum arthritis Mother      Dementia Mother      Heart failure Father      Cirrhosis Father          no ETOH    Rheum arthritis Sister      Breast cancer Mother's Sister      Stomach cancer Father's Brother      Stomach cancer Maternal Grandfather         Allergies   Allergen Reactions    Hydroxychloroquine Hives       Prior to Admission medications    Medication Sig Start Date End Date Taking? Authorizing Provider   acetaminophen (Tylenol) 500 mg tablet Take by mouth every 6 hours if needed.    Historical Provider, MD   amitriptyline (Elavil) 25 mg tablet Take 1 tablet (25 mg) by mouth once daily at bedtime. 12/10/23   Historical Provider, MD   atorvastatin (Lipitor) 40 mg tablet Take 1 tablet (40 mg) by mouth once daily. 10/27/23 4/24/24  Marina Casper MD   chlorhexidine (Peridex) 0.12 % solution Use 15 mL in the mouth or throat once daily for 2 days. 2/1/24 2/3/24  Betty Gary, APRN-CNP   cholecalciferol (Vitamin D3) 50 MCG (2000 UT) tablet Take 2 tablets (100 mcg) by mouth once daily. 7/27/23 7/26/24  Marina Casper MD   folic acid (Folvite) 1 mg tablet Take 1 tablet (1 mg) by mouth once daily.    Historical Provider, MD   levothyroxine (Synthroid, Levoxyl) 88 mcg tablet Take 1 tablet (88 mcg) by mouth once daily in the morning. Take before meals. 10/27/23   Marina Casper MD   methotrexate (Trexall) 2.5 mg tablet Take by mouth 1 (one) time per week.  Saturdays    Historical Provider, MD   pantoprazole (Protonix) 40 mg EC tablet Take 1 tablet (40 mg) by mouth once daily. 9/22/23   Marina Casper MD        PAT ROS:   Constitutional:   neg    Neuro/Psych:   Eyes:   Ears:   Nose:   neg    Mouth:   neg    Throat:   neg    Neck:   neg    Cardio:  "  neg    Respiratory:    cough (PND)  Endocrine:   GI:   neg    :   neg    Musculoskeletal:    arthralgias (rt shoulder)  Hematologic:   neg    Skin:  neg        Physical Exam  Vitals reviewed.   Constitutional:       Appearance: Normal appearance.   HENT:      Head: Normocephalic.      Mouth/Throat:      Mouth: Mucous membranes are moist.   Eyes:      Extraocular Movements: Extraocular movements intact.      Pupils: Pupils are equal, round, and reactive to light.   Cardiovascular:      Rate and Rhythm: Normal rate and regular rhythm.      Pulses: Normal pulses.      Heart sounds: Normal heart sounds.   Pulmonary:      Effort: Pulmonary effort is normal.      Breath sounds: Normal breath sounds.   Musculoskeletal:         General: Normal range of motion.      Cervical back: Normal range of motion.   Skin:     General: Skin is warm.   Neurological:      Mental Status: She is alert and oriented to person, place, and time.   Psychiatric:         Mood and Affect: Mood normal.         Behavior: Behavior normal.          PAT AIRWAY:   Airway:     Mallampati::  IV   SMALL ORAL APERTURE   HX HARD PALATE SURGERY   TEETH INTACT      /71   Pulse 100   Temp 35.5 °C (95.9 °F)   Resp 18   Ht 1.664 m (5' 5.5\")   Wt 90 kg (198 lb 6.6 oz)   SpO2 100%   BMI 32.52 kg/m²     Lab Results   Component Value Date    WBC 7.2 02/01/2024    HGB 13.8 02/01/2024    HCT 42.7 02/01/2024    MCV 94 02/01/2024     (H) 02/01/2024     Results from last 7 days   Lab Units 02/01/24  1127   SODIUM mmol/L 137   POTASSIUM mmol/L 4.5   CHLORIDE mmol/L 103   CO2 mmol/L 25   BUN mg/dL 8   CREATININE mg/dL 0.82   CALCIUM mg/dL 9.3   PROTEIN TOTAL g/dL 7.2   BILIRUBIN TOTAL mg/dL 0.4   ALK PHOS U/L 100   ALT U/L 11   AST U/L 11   GLUCOSE mg/dL 106*           ASSESSMENT/PLAN    Patient is a 58 year-old  scheduled for Robot Assisted Laparoscopic Sigmoid Colon and Rectum Resection  with Dr. Presley   on  2/6/24 .  CARDIOVASCULAR:  RCRI " score / Risk: The patients score is 0 based on history . Per ACC/AHA guidelines this places her at  3.9% risk for MACE undergoing a intermediate  risk procedure . The patient has the following risk factors: denies   Functional Capacity: The patients exercise tolerance is  4  METS. This is based on the patients. Patient denies  active cardiac symptoms or anginal equivalents .      PULMONARY:  The patient has the following factors that place them at increased risk of perioperative pulmonary complications;age greater than 65/BMI greater than 27/poor functional status/greater than 2.5 hour procedure.  Postoperatively the patient would benefit from early pulmonary toilet/incentive spirometry q 1-2 hours while awake/pulse oximetry/cautious use of respiratory depressant medications such as opioids/elevate the HOB/oral hygiene.      DVT:  CAPRINI SCORE=9  The patient has the following factors that increase her  Risk for thrombus formation ; Virchow's triad ,age>55, bmi>30, smoker , malignancy  ,intraabdominal procedure,  Surgical procedure >2 hrs  procedure .    Recommendations: DVT prophylaxis  per Dr. Presley protocol . SCD's, BOONE's, and early ambulation are recommended. Heparin or LMWH is recommended for the very high risk .      Risk assessment complete.  Patient is scheduled for  INTERMEDIATE  surgical risk procedure.  Patient is considered an ACCEPTABLE  risk to proceed with the planned procedure.      Preoperative medication instructions were provided and reviewed with the patient.  Any additional testing or evaluation was explained to the patient.  Nothing by mouth instructions were discussed and patient's questions were answered prior to conclusion to this encounter.  Patient verbalized understanding of preoperative instructions given in preadmission testing; discharge instructions available in EMR.

## 2024-02-03 LAB — STAPHYLOCOCCUS SPEC CULT: NORMAL

## 2024-02-05 ENCOUNTER — ANESTHESIA EVENT (OUTPATIENT)
Dept: OPERATING ROOM | Facility: HOSPITAL | Age: 59
DRG: 330 | End: 2024-02-05
Payer: MEDICARE

## 2024-02-06 ENCOUNTER — HOSPITAL ENCOUNTER (INPATIENT)
Facility: HOSPITAL | Age: 59
LOS: 7 days | Discharge: HOME | DRG: 330 | End: 2024-02-13
Attending: COLON & RECTAL SURGERY | Admitting: COLON & RECTAL SURGERY
Payer: MEDICARE

## 2024-02-06 ENCOUNTER — ANESTHESIA (OUTPATIENT)
Dept: OPERATING ROOM | Facility: HOSPITAL | Age: 59
DRG: 330 | End: 2024-02-06
Payer: MEDICARE

## 2024-02-06 DIAGNOSIS — Z90.49 S/P COLON RESECTION: ICD-10-CM

## 2024-02-06 DIAGNOSIS — Z43.2 ILEOSTOMY CARE (MULTI): ICD-10-CM

## 2024-02-06 DIAGNOSIS — C20 RECTAL CANCER (MULTI): Primary | ICD-10-CM

## 2024-02-06 DIAGNOSIS — C20 RECTAL ADENOCARCINOMA (MULTI): ICD-10-CM

## 2024-02-06 PROCEDURE — 2500000004 HC RX 250 GENERAL PHARMACY W/ HCPCS (ALT 636 FOR OP/ED)

## 2024-02-06 PROCEDURE — 0UT04ZZ RESECTION OF RIGHT OVARY, PERCUTANEOUS ENDOSCOPIC APPROACH: ICD-10-PCS | Performed by: COLON & RECTAL SURGERY

## 2024-02-06 PROCEDURE — 2500000004 HC RX 250 GENERAL PHARMACY W/ HCPCS (ALT 636 FOR OP/ED): Performed by: COLON & RECTAL SURGERY

## 2024-02-06 PROCEDURE — 3600000009 HC OR TIME - EACH INCREMENTAL 1 MINUTE - PROCEDURE LEVEL FOUR: Performed by: COLON & RECTAL SURGERY

## 2024-02-06 PROCEDURE — 2500000004 HC RX 250 GENERAL PHARMACY W/ HCPCS (ALT 636 FOR OP/ED): Performed by: NURSE PRACTITIONER

## 2024-02-06 PROCEDURE — 88309 TISSUE EXAM BY PATHOLOGIST: CPT | Performed by: PATHOLOGY

## 2024-02-06 PROCEDURE — S2900 ROBOTIC SURGICAL SYSTEM: HCPCS | Performed by: COLON & RECTAL SURGERY

## 2024-02-06 PROCEDURE — 88305 TISSUE EXAM BY PATHOLOGIST: CPT | Performed by: PATHOLOGY

## 2024-02-06 PROCEDURE — 2720000007 HC OR 272 NO HCPCS: Performed by: COLON & RECTAL SURGERY

## 2024-02-06 PROCEDURE — A44204 PR LAP,SURG,COLECTOMY, PARTIAL, W/ANAST

## 2024-02-06 PROCEDURE — 3700000001 HC GENERAL ANESTHESIA TIME - INITIAL BASE CHARGE: Performed by: COLON & RECTAL SURGERY

## 2024-02-06 PROCEDURE — 88305 TISSUE EXAM BY PATHOLOGIST: CPT | Mod: TC,SUR,AHULAB | Performed by: COLON & RECTAL SURGERY

## 2024-02-06 PROCEDURE — 0DTN4ZZ RESECTION OF SIGMOID COLON, PERCUTANEOUS ENDOSCOPIC APPROACH: ICD-10-PCS | Performed by: COLON & RECTAL SURGERY

## 2024-02-06 PROCEDURE — 99231 SBSQ HOSP IP/OBS SF/LOW 25: CPT

## 2024-02-06 PROCEDURE — 58661 LAPAROSCOPY REMOVE ADNEXA: CPT | Performed by: COLON & RECTAL SURGERY

## 2024-02-06 PROCEDURE — 2500000005 HC RX 250 GENERAL PHARMACY W/O HCPCS: Performed by: COLON & RECTAL SURGERY

## 2024-02-06 PROCEDURE — 3700000002 HC GENERAL ANESTHESIA TIME - EACH INCREMENTAL 1 MINUTE: Performed by: COLON & RECTAL SURGERY

## 2024-02-06 PROCEDURE — 2500000005 HC RX 250 GENERAL PHARMACY W/O HCPCS

## 2024-02-06 PROCEDURE — 8E0W4CZ ROBOTIC ASSISTED PROCEDURE OF TRUNK REGION, PERCUTANEOUS ENDOSCOPIC APPROACH: ICD-10-PCS | Performed by: COLON & RECTAL SURGERY

## 2024-02-06 PROCEDURE — 7100000001 HC RECOVERY ROOM TIME - INITIAL BASE CHARGE: Performed by: COLON & RECTAL SURGERY

## 2024-02-06 PROCEDURE — A44204 PR LAP,SURG,COLECTOMY, PARTIAL, W/ANAST: Performed by: ANESTHESIOLOGY

## 2024-02-06 PROCEDURE — 94760 N-INVAS EAR/PLS OXIMETRY 1: CPT

## 2024-02-06 PROCEDURE — 44208 L COLECTOMY/COLOPROCTOSTOMY: CPT | Performed by: COLON & RECTAL SURGERY

## 2024-02-06 PROCEDURE — 3600000004 HC OR TIME - INITIAL BASE CHARGE - PROCEDURE LEVEL FOUR: Performed by: COLON & RECTAL SURGERY

## 2024-02-06 PROCEDURE — 0DJD8ZZ INSPECTION OF LOWER INTESTINAL TRACT, VIA NATURAL OR ARTIFICIAL OPENING ENDOSCOPIC: ICD-10-PCS | Performed by: COLON & RECTAL SURGERY

## 2024-02-06 PROCEDURE — 2780000003 HC OR 278 NO HCPCS: Performed by: COLON & RECTAL SURGERY

## 2024-02-06 PROCEDURE — 2500000004 HC RX 250 GENERAL PHARMACY W/ HCPCS (ALT 636 FOR OP/ED): Performed by: ANESTHESIOLOGY

## 2024-02-06 PROCEDURE — 0DTP4ZZ RESECTION OF RECTUM, PERCUTANEOUS ENDOSCOPIC APPROACH: ICD-10-PCS | Performed by: COLON & RECTAL SURGERY

## 2024-02-06 PROCEDURE — 2500000001 HC RX 250 WO HCPCS SELF ADMINISTERED DRUGS (ALT 637 FOR MEDICARE OP): Performed by: COLON & RECTAL SURGERY

## 2024-02-06 PROCEDURE — 2500000005 HC RX 250 GENERAL PHARMACY W/O HCPCS: Performed by: ANESTHESIOLOGY

## 2024-02-06 PROCEDURE — 7100000002 HC RECOVERY ROOM TIME - EACH INCREMENTAL 1 MINUTE: Performed by: COLON & RECTAL SURGERY

## 2024-02-06 PROCEDURE — 1100000001 HC PRIVATE ROOM DAILY

## 2024-02-06 RX ORDER — NALOXONE HYDROCHLORIDE 0.4 MG/ML
0.2 INJECTION, SOLUTION INTRAMUSCULAR; INTRAVENOUS; SUBCUTANEOUS EVERY 5 MIN PRN
Status: DISCONTINUED | OUTPATIENT
Start: 2024-02-06 | End: 2024-02-13 | Stop reason: HOSPADM

## 2024-02-06 RX ORDER — PHENYLEPHRINE HCL IN 0.9% NACL 1 MG/10 ML
SYRINGE (ML) INTRAVENOUS AS NEEDED
Status: DISCONTINUED | OUTPATIENT
Start: 2024-02-06 | End: 2024-02-06

## 2024-02-06 RX ORDER — OXYCODONE HYDROCHLORIDE 5 MG/1
5 TABLET ORAL EVERY 4 HOURS PRN
Status: DISCONTINUED | OUTPATIENT
Start: 2024-02-06 | End: 2024-02-13 | Stop reason: HOSPADM

## 2024-02-06 RX ORDER — FENTANYL CITRATE 50 UG/ML
INJECTION, SOLUTION INTRAMUSCULAR; INTRAVENOUS AS NEEDED
Status: DISCONTINUED | OUTPATIENT
Start: 2024-02-06 | End: 2024-02-06

## 2024-02-06 RX ORDER — CEFAZOLIN 1 G/1
INJECTION, POWDER, FOR SOLUTION INTRAVENOUS AS NEEDED
Status: DISCONTINUED | OUTPATIENT
Start: 2024-02-06 | End: 2024-02-06

## 2024-02-06 RX ORDER — ROCURONIUM BROMIDE 10 MG/ML
INJECTION, SOLUTION INTRAVENOUS AS NEEDED
Status: DISCONTINUED | OUTPATIENT
Start: 2024-02-06 | End: 2024-02-06

## 2024-02-06 RX ORDER — OXYCODONE HYDROCHLORIDE 5 MG/1
10 TABLET ORAL EVERY 4 HOURS PRN
Status: DISCONTINUED | OUTPATIENT
Start: 2024-02-06 | End: 2024-02-13 | Stop reason: HOSPADM

## 2024-02-06 RX ORDER — MIDAZOLAM HYDROCHLORIDE 1 MG/ML
INJECTION INTRAMUSCULAR; INTRAVENOUS AS NEEDED
Status: DISCONTINUED | OUTPATIENT
Start: 2024-02-06 | End: 2024-02-06

## 2024-02-06 RX ORDER — SODIUM CHLORIDE, SODIUM LACTATE, POTASSIUM CHLORIDE, CALCIUM CHLORIDE 600; 310; 30; 20 MG/100ML; MG/100ML; MG/100ML; MG/100ML
INJECTION, SOLUTION INTRAVENOUS CONTINUOUS PRN
Status: DISCONTINUED | OUTPATIENT
Start: 2024-02-06 | End: 2024-02-06

## 2024-02-06 RX ORDER — ACETAMINOPHEN 325 MG/1
650 TABLET ORAL EVERY 4 HOURS PRN
Status: DISCONTINUED | OUTPATIENT
Start: 2024-02-06 | End: 2024-02-06 | Stop reason: HOSPADM

## 2024-02-06 RX ORDER — ACETAMINOPHEN 325 MG/1
650 TABLET ORAL EVERY 6 HOURS
Status: DISCONTINUED | OUTPATIENT
Start: 2024-02-06 | End: 2024-02-13 | Stop reason: HOSPADM

## 2024-02-06 RX ORDER — HYDRALAZINE HYDROCHLORIDE 20 MG/ML
5 INJECTION INTRAMUSCULAR; INTRAVENOUS EVERY 30 MIN PRN
Status: DISCONTINUED | OUTPATIENT
Start: 2024-02-06 | End: 2024-02-06 | Stop reason: HOSPADM

## 2024-02-06 RX ORDER — PANTOPRAZOLE SODIUM 40 MG/1
40 TABLET, DELAYED RELEASE ORAL DAILY
Status: DISCONTINUED | OUTPATIENT
Start: 2024-02-06 | End: 2024-02-09

## 2024-02-06 RX ORDER — SODIUM CHLORIDE, SODIUM LACTATE, POTASSIUM CHLORIDE, CALCIUM CHLORIDE 600; 310; 30; 20 MG/100ML; MG/100ML; MG/100ML; MG/100ML
100 INJECTION, SOLUTION INTRAVENOUS CONTINUOUS
Status: DISCONTINUED | OUTPATIENT
Start: 2024-02-06 | End: 2024-02-06 | Stop reason: HOSPADM

## 2024-02-06 RX ORDER — ONDANSETRON 4 MG/1
4 TABLET, ORALLY DISINTEGRATING ORAL EVERY 8 HOURS PRN
Status: DISCONTINUED | OUTPATIENT
Start: 2024-02-06 | End: 2024-02-10

## 2024-02-06 RX ORDER — ONDANSETRON HYDROCHLORIDE 2 MG/ML
4 INJECTION, SOLUTION INTRAVENOUS EVERY 8 HOURS PRN
Status: DISCONTINUED | OUTPATIENT
Start: 2024-02-06 | End: 2024-02-10

## 2024-02-06 RX ORDER — KETOROLAC TROMETHAMINE 30 MG/ML
15 INJECTION, SOLUTION INTRAMUSCULAR; INTRAVENOUS EVERY 6 HOURS SCHEDULED
Status: COMPLETED | OUTPATIENT
Start: 2024-02-06 | End: 2024-02-09

## 2024-02-06 RX ORDER — AMITRIPTYLINE HYDROCHLORIDE 25 MG/1
25 TABLET, FILM COATED ORAL NIGHTLY
Status: DISCONTINUED | OUTPATIENT
Start: 2024-02-06 | End: 2024-02-13 | Stop reason: HOSPADM

## 2024-02-06 RX ORDER — HEPARIN SODIUM 5000 [USP'U]/ML
5000 INJECTION, SOLUTION INTRAVENOUS; SUBCUTANEOUS ONCE
Status: COMPLETED | OUTPATIENT
Start: 2024-02-06 | End: 2024-02-06

## 2024-02-06 RX ORDER — LEVOTHYROXINE SODIUM 88 UG/1
88 TABLET ORAL
Status: DISCONTINUED | OUTPATIENT
Start: 2024-02-07 | End: 2024-02-13 | Stop reason: HOSPADM

## 2024-02-06 RX ORDER — OXYCODONE HYDROCHLORIDE 5 MG/1
5 TABLET ORAL EVERY 4 HOURS PRN
Status: DISCONTINUED | OUTPATIENT
Start: 2024-02-06 | End: 2024-02-06 | Stop reason: HOSPADM

## 2024-02-06 RX ORDER — ENOXAPARIN SODIUM 100 MG/ML
40 INJECTION SUBCUTANEOUS EVERY 24 HOURS
Status: DISCONTINUED | OUTPATIENT
Start: 2024-02-06 | End: 2024-02-13 | Stop reason: HOSPADM

## 2024-02-06 RX ORDER — ATORVASTATIN CALCIUM 40 MG/1
40 TABLET, FILM COATED ORAL DAILY
Status: DISCONTINUED | OUTPATIENT
Start: 2024-02-06 | End: 2024-02-13 | Stop reason: HOSPADM

## 2024-02-06 RX ORDER — DIPHENHYDRAMINE HYDROCHLORIDE 50 MG/ML
12.5 INJECTION INTRAMUSCULAR; INTRAVENOUS ONCE AS NEEDED
Status: DISCONTINUED | OUTPATIENT
Start: 2024-02-06 | End: 2024-02-06 | Stop reason: HOSPADM

## 2024-02-06 RX ORDER — LIDOCAINE HYDROCHLORIDE 10 MG/ML
0.1 INJECTION, SOLUTION EPIDURAL; INFILTRATION; INTRACAUDAL; PERINEURAL ONCE
Status: DISCONTINUED | OUTPATIENT
Start: 2024-02-06 | End: 2024-02-06 | Stop reason: HOSPADM

## 2024-02-06 RX ORDER — ALBUTEROL SULFATE 0.83 MG/ML
2.5 SOLUTION RESPIRATORY (INHALATION) ONCE AS NEEDED
Status: DISCONTINUED | OUTPATIENT
Start: 2024-02-06 | End: 2024-02-06 | Stop reason: HOSPADM

## 2024-02-06 RX ORDER — LIDOCAINE HYDROCHLORIDE 20 MG/ML
INJECTION, SOLUTION EPIDURAL; INFILTRATION; INTRACAUDAL; PERINEURAL AS NEEDED
Status: DISCONTINUED | OUTPATIENT
Start: 2024-02-06 | End: 2024-02-06

## 2024-02-06 RX ORDER — SODIUM CHLORIDE 9 MG/ML
40 INJECTION, SOLUTION INTRAVENOUS CONTINUOUS
Status: DISCONTINUED | OUTPATIENT
Start: 2024-02-06 | End: 2024-02-07

## 2024-02-06 RX ORDER — PROPOFOL 10 MG/ML
INJECTION, EMULSION INTRAVENOUS AS NEEDED
Status: DISCONTINUED | OUTPATIENT
Start: 2024-02-06 | End: 2024-02-06

## 2024-02-06 RX ORDER — CEFAZOLIN SODIUM 2 G/100ML
2 INJECTION, SOLUTION INTRAVENOUS ONCE
Status: DISCONTINUED | OUTPATIENT
Start: 2024-02-06 | End: 2024-02-06 | Stop reason: HOSPADM

## 2024-02-06 RX ORDER — ONDANSETRON HYDROCHLORIDE 2 MG/ML
INJECTION, SOLUTION INTRAVENOUS AS NEEDED
Status: DISCONTINUED | OUTPATIENT
Start: 2024-02-06 | End: 2024-02-06

## 2024-02-06 RX ORDER — HYDROMORPHONE HYDROCHLORIDE 1 MG/ML
INJECTION, SOLUTION INTRAMUSCULAR; INTRAVENOUS; SUBCUTANEOUS AS NEEDED
Status: DISCONTINUED | OUTPATIENT
Start: 2024-02-06 | End: 2024-02-06

## 2024-02-06 RX ORDER — METRONIDAZOLE 500 MG/100ML
500 INJECTION, SOLUTION INTRAVENOUS ONCE
Status: COMPLETED | OUTPATIENT
Start: 2024-02-06 | End: 2024-02-06

## 2024-02-06 RX ORDER — LABETALOL HYDROCHLORIDE 5 MG/ML
5 INJECTION, SOLUTION INTRAVENOUS ONCE AS NEEDED
Status: DISCONTINUED | OUTPATIENT
Start: 2024-02-06 | End: 2024-02-06 | Stop reason: HOSPADM

## 2024-02-06 RX ORDER — GABAPENTIN 300 MG/1
300 CAPSULE ORAL 3 TIMES DAILY
Status: DISCONTINUED | OUTPATIENT
Start: 2024-02-06 | End: 2024-02-13 | Stop reason: HOSPADM

## 2024-02-06 RX ORDER — ONDANSETRON HYDROCHLORIDE 2 MG/ML
4 INJECTION, SOLUTION INTRAVENOUS ONCE AS NEEDED
Status: DISCONTINUED | OUTPATIENT
Start: 2024-02-06 | End: 2024-02-06 | Stop reason: HOSPADM

## 2024-02-06 RX ORDER — KETOROLAC TROMETHAMINE 30 MG/ML
INJECTION, SOLUTION INTRAMUSCULAR; INTRAVENOUS AS NEEDED
Status: DISCONTINUED | OUTPATIENT
Start: 2024-02-06 | End: 2024-02-06

## 2024-02-06 RX ORDER — DEXAMETHASONE SODIUM PHOSPHATE 4 MG/ML
INJECTION, SOLUTION INTRA-ARTICULAR; INTRALESIONAL; INTRAMUSCULAR; INTRAVENOUS; SOFT TISSUE AS NEEDED
Status: DISCONTINUED | OUTPATIENT
Start: 2024-02-06 | End: 2024-02-06

## 2024-02-06 RX ADMIN — ROCURONIUM BROMIDE 100 MG: 10 INJECTION, SOLUTION INTRAVENOUS at 08:49

## 2024-02-06 RX ADMIN — PROPOFOL 50 MG: 10 INJECTION, EMULSION INTRAVENOUS at 08:48

## 2024-02-06 RX ADMIN — ONDANSETRON 4 MG: 2 INJECTION INTRAMUSCULAR; INTRAVENOUS at 11:49

## 2024-02-06 RX ADMIN — ENOXAPARIN SODIUM 40 MG: 40 INJECTION SUBCUTANEOUS at 21:27

## 2024-02-06 RX ADMIN — FENTANYL CITRATE 100 MCG: 50 INJECTION, SOLUTION INTRAMUSCULAR; INTRAVENOUS at 08:49

## 2024-02-06 RX ADMIN — ROCURONIUM BROMIDE 10 MG: 10 INJECTION, SOLUTION INTRAVENOUS at 10:55

## 2024-02-06 RX ADMIN — ROCURONIUM BROMIDE 20 MG: 10 INJECTION, SOLUTION INTRAVENOUS at 10:20

## 2024-02-06 RX ADMIN — HYDROMORPHONE HYDROCHLORIDE 0.5 MG: 1 INJECTION, SOLUTION INTRAMUSCULAR; INTRAVENOUS; SUBCUTANEOUS at 11:44

## 2024-02-06 RX ADMIN — HYDROMORPHONE HYDROCHLORIDE 0.2 MG: 0.2 INJECTION, SOLUTION INTRAMUSCULAR; INTRAVENOUS; SUBCUTANEOUS at 15:54

## 2024-02-06 RX ADMIN — HYDROMORPHONE HYDROCHLORIDE 0.5 MG: 1 INJECTION, SOLUTION INTRAMUSCULAR; INTRAVENOUS; SUBCUTANEOUS at 12:32

## 2024-02-06 RX ADMIN — OXYCODONE HYDROCHLORIDE 10 MG: 5 TABLET ORAL at 21:27

## 2024-02-06 RX ADMIN — HEPARIN SODIUM 5000 UNITS: 5000 INJECTION INTRAVENOUS; SUBCUTANEOUS at 07:02

## 2024-02-06 RX ADMIN — KETOROLAC TROMETHAMINE 15 MG: 30 INJECTION, SOLUTION INTRAMUSCULAR; INTRAVENOUS at 18:20

## 2024-02-06 RX ADMIN — ACETAMINOPHEN 650 MG: 325 TABLET ORAL at 21:27

## 2024-02-06 RX ADMIN — PANTOPRAZOLE SODIUM 40 MG: 40 TABLET, DELAYED RELEASE ORAL at 21:27

## 2024-02-06 RX ADMIN — Medication 3 L/MIN: at 12:30

## 2024-02-06 RX ADMIN — KETOROLAC TROMETHAMINE 15 MG: 30 INJECTION, SOLUTION INTRAMUSCULAR; INTRAVENOUS at 23:47

## 2024-02-06 RX ADMIN — DEXAMETHASONE SODIUM PHOSPHATE 8 MG: 4 INJECTION, SOLUTION INTRAMUSCULAR; INTRAVENOUS at 09:00

## 2024-02-06 RX ADMIN — ACETAMINOPHEN 650 MG: 325 TABLET ORAL at 15:54

## 2024-02-06 RX ADMIN — GABAPENTIN 300 MG: 300 CAPSULE ORAL at 21:27

## 2024-02-06 RX ADMIN — LIDOCAINE HYDROCHLORIDE 100 MG: 20 INJECTION, SOLUTION EPIDURAL; INFILTRATION; INTRACAUDAL; PERINEURAL at 08:48

## 2024-02-06 RX ADMIN — SODIUM CHLORIDE, POTASSIUM CHLORIDE, SODIUM LACTATE AND CALCIUM CHLORIDE: 600; 310; 30; 20 INJECTION, SOLUTION INTRAVENOUS at 08:42

## 2024-02-06 RX ADMIN — HYDROMORPHONE HYDROCHLORIDE 0.5 MG: 1 INJECTION, SOLUTION INTRAMUSCULAR; INTRAVENOUS; SUBCUTANEOUS at 11:25

## 2024-02-06 RX ADMIN — SODIUM CHLORIDE 40 ML/HR: 9 INJECTION, SOLUTION INTRAVENOUS at 15:53

## 2024-02-06 RX ADMIN — METRONIDAZOLE 500 MG: 500 SOLUTION INTRAVENOUS at 08:57

## 2024-02-06 RX ADMIN — MIDAZOLAM HYDROCHLORIDE 2 MG: 1 INJECTION, SOLUTION INTRAMUSCULAR; INTRAVENOUS at 08:42

## 2024-02-06 RX ADMIN — KETOROLAC TROMETHAMINE 30 MG: 30 INJECTION, SOLUTION INTRAMUSCULAR; INTRAVENOUS at 11:49

## 2024-02-06 RX ADMIN — CEFAZOLIN 2 G: 1 INJECTION, POWDER, FOR SOLUTION INTRAMUSCULAR; INTRAVENOUS at 08:57

## 2024-02-06 RX ADMIN — HYDROMORPHONE HYDROCHLORIDE 0.5 MG: 1 INJECTION, SOLUTION INTRAMUSCULAR; INTRAVENOUS; SUBCUTANEOUS at 13:00

## 2024-02-06 RX ADMIN — Medication 200 MCG: at 08:49

## 2024-02-06 RX ADMIN — SUGAMMADEX 200 MG: 100 INJECTION, SOLUTION INTRAVENOUS at 11:56

## 2024-02-06 RX ADMIN — Medication 200 MCG: at 11:04

## 2024-02-06 RX ADMIN — PROPOFOL 150 MG: 10 INJECTION, EMULSION INTRAVENOUS at 08:49

## 2024-02-06 RX ADMIN — ATORVASTATIN CALCIUM 40 MG: 40 TABLET, FILM COATED ORAL at 21:27

## 2024-02-06 RX ADMIN — GABAPENTIN 300 MG: 300 CAPSULE ORAL at 15:54

## 2024-02-06 RX ADMIN — AMITRIPTYLINE HYDROCHLORIDE 25 MG: 25 TABLET, FILM COATED ORAL at 21:00

## 2024-02-06 SDOH — SOCIAL STABILITY: SOCIAL INSECURITY: DO YOU FEEL UNSAFE GOING BACK TO THE PLACE WHERE YOU ARE LIVING?: YES

## 2024-02-06 SDOH — SOCIAL STABILITY: SOCIAL INSECURITY: HAS ANYONE EVER THREATENED TO HURT YOUR FAMILY OR YOUR PETS?: YES

## 2024-02-06 SDOH — SOCIAL STABILITY: SOCIAL INSECURITY: DO YOU FEEL ANYONE HAS EXPLOITED OR TAKEN ADVANTAGE OF YOU FINANCIALLY OR OF YOUR PERSONAL PROPERTY?: YES

## 2024-02-06 SDOH — HEALTH STABILITY: MENTAL HEALTH: CURRENT SMOKER: 1

## 2024-02-06 SDOH — SOCIAL STABILITY: SOCIAL INSECURITY: DOES ANYONE TRY TO KEEP YOU FROM HAVING/CONTACTING OTHER FRIENDS OR DOING THINGS OUTSIDE YOUR HOME?: YES

## 2024-02-06 SDOH — SOCIAL STABILITY: SOCIAL INSECURITY: HAVE YOU HAD THOUGHTS OF HARMING ANYONE ELSE?: NO

## 2024-02-06 SDOH — SOCIAL STABILITY: SOCIAL INSECURITY: ARE THERE ANY APPARENT SIGNS OF INJURIES/BEHAVIORS THAT COULD BE RELATED TO ABUSE/NEGLECT?: YES

## 2024-02-06 SDOH — SOCIAL STABILITY: SOCIAL INSECURITY: WERE YOU ABLE TO COMPLETE ALL THE BEHAVIORAL HEALTH SCREENINGS?: YES

## 2024-02-06 SDOH — SOCIAL STABILITY: SOCIAL INSECURITY: ABUSE: ADULT

## 2024-02-06 SDOH — SOCIAL STABILITY: SOCIAL INSECURITY: ARE YOU OR HAVE YOU BEEN THREATENED OR ABUSED PHYSICALLY, EMOTIONALLY, OR SEXUALLY BY ANYONE?: YES

## 2024-02-06 ASSESSMENT — LIFESTYLE VARIABLES
SKIP TO QUESTIONS 9-10: 0
HOW MANY STANDARD DRINKS CONTAINING ALCOHOL DO YOU HAVE ON A TYPICAL DAY: 1 OR 2
AUDIT-C TOTAL SCORE: 2
HOW OFTEN DO YOU HAVE 6 OR MORE DRINKS ON ONE OCCASION: LESS THAN MONTHLY
SUBSTANCE_ABUSE_PAST_12_MONTHS: NO
HOW OFTEN DO YOU HAVE A DRINK CONTAINING ALCOHOL: MONTHLY OR LESS
PRESCIPTION_ABUSE_PAST_12_MONTHS: NO
AUDIT-C TOTAL SCORE: 2

## 2024-02-06 ASSESSMENT — PAIN - FUNCTIONAL ASSESSMENT
PAIN_FUNCTIONAL_ASSESSMENT: 0-10
PAIN_FUNCTIONAL_ASSESSMENT: VAS (VISUAL ANALOG SCALE)
PAIN_FUNCTIONAL_ASSESSMENT: 0-10
PAIN_FUNCTIONAL_ASSESSMENT: VAS (VISUAL ANALOG SCALE)
PAIN_FUNCTIONAL_ASSESSMENT: 0-10

## 2024-02-06 ASSESSMENT — COGNITIVE AND FUNCTIONAL STATUS - GENERAL
MOVING TO AND FROM BED TO CHAIR: A LITTLE
PATIENT BASELINE BEDBOUND: NO
HELP NEEDED FOR BATHING: A LITTLE
WALKING IN HOSPITAL ROOM: A LITTLE
TURNING FROM BACK TO SIDE WHILE IN FLAT BAD: A LITTLE
CLIMB 3 TO 5 STEPS WITH RAILING: A LOT
TOILETING: A LITTLE
HELP NEEDED FOR BATHING: A LITTLE
WALKING IN HOSPITAL ROOM: A LOT
STANDING UP FROM CHAIR USING ARMS: A LITTLE
DRESSING REGULAR UPPER BODY CLOTHING: A LITTLE
MOBILITY SCORE: 18
DRESSING REGULAR LOWER BODY CLOTHING: A LITTLE
MOVING FROM LYING ON BACK TO SITTING ON SIDE OF FLAT BED WITH BEDRAILS: A LITTLE
DRESSING REGULAR LOWER BODY CLOTHING: A LITTLE
TOILETING: A LITTLE
MOVING TO AND FROM BED TO CHAIR: A LITTLE
DAILY ACTIVITIY SCORE: 20
STANDING UP FROM CHAIR USING ARMS: A LITTLE
DRESSING REGULAR UPPER BODY CLOTHING: A LITTLE
DAILY ACTIVITIY SCORE: 20
TURNING FROM BACK TO SIDE WHILE IN FLAT BAD: A LITTLE
CLIMB 3 TO 5 STEPS WITH RAILING: A LITTLE
MOBILITY SCORE: 16
MOVING FROM LYING ON BACK TO SITTING ON SIDE OF FLAT BED WITH BEDRAILS: A LITTLE

## 2024-02-06 ASSESSMENT — PAIN DESCRIPTION - LOCATION
LOCATION: ABDOMEN
LOCATION: ABDOMEN

## 2024-02-06 ASSESSMENT — PAIN SCALES - GENERAL
PAINLEVEL_OUTOF10: 3
PAINLEVEL_OUTOF10: 0 - NO PAIN
PAINLEVEL_OUTOF10: 4
PAINLEVEL_OUTOF10: 3
PAINLEVEL_OUTOF10: 8
PAINLEVEL_OUTOF10: 9
PAINLEVEL_OUTOF10: 0 - NO PAIN
PAINLEVEL_OUTOF10: 8
PAINLEVEL_OUTOF10: 8
PAINLEVEL_OUTOF10: 0 - NO PAIN
PAINLEVEL_OUTOF10: 7
PAINLEVEL_OUTOF10: 6

## 2024-02-06 ASSESSMENT — PATIENT HEALTH QUESTIONNAIRE - PHQ9
1. LITTLE INTEREST OR PLEASURE IN DOING THINGS: SEVERAL DAYS
2. FEELING DOWN, DEPRESSED OR HOPELESS: SEVERAL DAYS
SUM OF ALL RESPONSES TO PHQ9 QUESTIONS 1 & 2: 2

## 2024-02-06 ASSESSMENT — ACTIVITIES OF DAILY LIVING (ADL): LACK_OF_TRANSPORTATION: NO

## 2024-02-06 NOTE — PROGRESS NOTES
Pharmacy Medication History Review    Tracey Carranza is a 58 y.o. female admitted for Rectal adenocarcinoma (CMS/[facility]). Pharmacy reviewed the patient's [id number] medications and allergies for accuracy.    The list below reflectives the updated PTA list. Please review each medication in order reconciliation for additional clarification and justification.  Medications Prior to Admission   Medication Sig Dispense Refill Last Dose    acetaminophen (Tylenol) 500 mg tablet Take by mouth every 6 hours if needed.   2/6/2024    amitriptyline (Elavil) 25 mg tablet Take 1 tablet (25 mg) by mouth once daily at bedtime.   2/5/2024    atorvastatin (Lipitor) 40 mg tablet Take 1 tablet (40 mg) by mouth once daily. 90 tablet 1 Past Week    cholecalciferol (Vitamin D3) 50 MCG (2000 UT) tablet Take 2 tablets (100 mcg) by mouth once daily. 60 tablet 11 Past Week    folic acid (Folvite) 1 mg tablet Take 1 tablet (1 mg) by mouth once daily.   2/5/2024    levothyroxine (Synthroid, Levoxyl) 88 mcg tablet Take 1 tablet (88 mcg) by mouth once daily in the morning. Take before meals. 90 tablet 3 2/5/2024    methotrexate (Trexall) 2.5 mg tablet Take by mouth 1 (one) time per week.  Saturdays   Past Week    pantoprazole (Protonix) 40 mg EC tablet Take 1 tablet (40 mg) by mouth once daily. 90 tablet 1 2/5/2024        The list below reflectives the updated allergy list. Please review each documented allergy for additional clarification and justification.  Allergies  Reviewed by Mirella Dixon RN on 2/6/2024        Severity Reactions Comments    Hydroxychloroquine Low Hives             Below are additional concerns with the patient's PTA list.  Prior to Admission Medications   Prescriptions Last Dose Informant Patient Reported? Taking?   acetaminophen (Tylenol) 500 mg tablet 2/6/2024  Yes Yes   Sig: Take by mouth every 6 hours if needed.   amitriptyline (Elavil) 25 mg tablet 2/5/2024  Yes Yes   Sig: Take 1 tablet (25 mg) by mouth once  daily at bedtime.   atorvastatin (Lipitor) 40 mg tablet Past Week  No Yes   Sig: Take 1 tablet (40 mg) by mouth once daily.   cholecalciferol (Vitamin D3) 50 MCG (2000 UT) tablet Past Week  No Yes   Sig: Take 2 tablets (100 mcg) by mouth once daily.   folic acid (Folvite) 1 mg tablet 2/5/2024  Yes Yes   Sig: Take 1 tablet (1 mg) by mouth once daily.   levothyroxine (Synthroid, Levoxyl) 88 mcg tablet 2/5/2024  No Yes   Sig: Take 1 tablet (88 mcg) by mouth once daily in the morning. Take before meals.   methotrexate (Trexall) 2.5 mg tablet Past Week  Yes Yes   Sig: Take by mouth 1 (one) time per week.  Saturdays   pantoprazole (Protonix) 40 mg EC tablet 2/5/2024  No Yes   Sig: Take 1 tablet (40 mg) by mouth once daily.      Facility-Administered Medications: None    Per patient    Stephanie Posada CPhT

## 2024-02-06 NOTE — OP NOTE
Robot Assisted Laparoscopic Sigmoid Colon and Rectum Resection, Right Oophrectomy Operative Note     Date: 2024  OR Location: [facility] OR    Name: Tracey Carranza, : 1965, Age: 58 y.o., MRN: [id number], Sex: female    Diagnosis  Pre-op Diagnosis     * Rectal cancer (CMS/HCC) [C20] Post-op Diagnosis     * Rectal cancer (CMS/HCC) [C20]     Procedures  Robot Assisted Laparoscopic Sigmoid Colon and Rectum Resection, Right Oophrectomy  69509 - DE LAPS COLECTMY PRTL W/COLOPXTSTMY LW ANAST W/CLST      Surgeons      * Bryanna Presley - Primary    Resident/Fellow/Other Assistant:  Surgeon(s) and Role:    Procedure Summary  Anesthesia: General  ASA: II  Anesthesia Staff: Anesthesiologist: Nikolay Joe MD  C-AA: BETTYE Syed; BETTYE Mark  Estimated Blood Loss: 25mL  Intra-op Medications:   Administrations occurring from 0830 to 1230 on 24:   Medication Name Total Dose   BUPivacaine HCl (Marcaine) 0.5 % (5 mg/mL) 30 mL in sodium chloride 0.9% 30 mL syringe 60 mL   metroNIDAZOLE (Flagyl) 500 mg in NaCl (iso-os) 100 mL 500 mg              Anesthesia Record               Intraprocedure I/O Totals          Intake    lactated Ringer's 1600.00 mL    Total Intake 1600 mL       Output    Urine 125 mL    Est. Blood Loss 25 mL    Total Output 150 mL       Net    Net Volume 1450 mL          Specimen:   ID Type Source Tests Collected by Time   1 : Right Ovary History of Rectal Cancer, rule out mets Tissue OVARY OOPHORECTOMY RIGHT SURGICAL PATHOLOGY EXAM Bryanna Presley MD 2024 1047   2 : Rectum and Sigmoid, Distal Donut Tissue RECTUM LOW ANTERIOR RESECTION SURGICAL PATHOLOGY EXAM Bryanna Presley MD 2024 1139        Staff:   Circulator: Arash Galloway RN  Relief Circulator: Daniela Gallo RN; Sola Stokes RN  Scrub Person: Al Rahman; Sola Worley; Cassia LUNDBERG RN; Leatha Thompson; Nancy Ramirez         Drains and/or Catheters:   Closed/Suction Drain Medial LLQ 19  Fr. (Active)   Dressing Status Clean;Dry;Occlusive 02/06/24 1219       Ileostomy RUQ (Active)   Stomal Appliance Clean;Dry;Intact 02/06/24 1218       [REMOVED] NG/OG/Feeding Tube OG - Clarion sump 16 Fr Center mouth (Removed)   Tube Status Low intermittent suction 02/06/24 0859   Placement Verification Gastric contents 02/06/24 0859   Site Assessment Clean;Dry;Intact 02/06/24 0859           Findings: No metastatic disease, No reach issues, no leak to air leak testing, both sides of the anastomosis appear healthy and pink without bleeding.      Indications: Tracey Carranza is an 58 y.o. female who is having surgery for Rectal cancer (CMS/[facility]) [C20].     The patient was seen in the preoperative area. The risks, benefits, complications, treatment options, non-operative alternatives, expected recovery and outcomes were discussed with the patient. The possibilities of reaction to medication, pulmonary aspiration, injury to surrounding structures, bleeding, recurrent infection, the need for additional procedures, failure to diagnose a condition, and creating a complication requiring transfusion or operation were discussed with the patient. The patient concurred with the proposed plan, giving informed consent.  The site of surgery was properly noted/marked if necessary per policy. The patient has been actively warmed in preoperative area. Preoperative antibiotics have been ordered and given within 1 hours of incision. Venous thrombosis prophylaxis have been ordered including bilateral sequential compression devices and chemical prophylaxis    Procedure Details: he patient was brought to the operating room huddle was performed, all were in agreement, and then removed to the table in the lithotomy position.  General endotracheal anesthesia was induced, IV antibiotics were administered within 1 hour of incision,  and a heparin shot was given in preop.  The patient was prepped using ChloraPrep which was allowed to dry for 3  minutes and taped and draped in the usual sterile fashion.    The previously marked right upper quadrant ostomy site was identified and instilled with local.  A circumferential disc of skin was excised in the subcutaneous tissues and anterior rectus sheath were divided vertically.  The rectus muscles were split using a Aster and the posterior sheath was grasped and entered using scissors.  Finger sweep was performed there was no peritoneal carcinomatosis a small wound protector was placed and a 12 mm robotic port.  8 mm robotic ports were placed in the umbilicus and 2 on the left side under direct vision a 5 mm assistant port was placed in the right lower quadrant.  The liver appeared completely normal there was no evidence of metastatic disease.  The IMV was identified and the plane posterior to the IMV was dissected out using the LigaSure.  The IMV was taken in a high ligation.  The posterior aspect of the descending colon mesentery was dissected free from Gerota's fascia out to the abdominal wall the omentum was then retracted cephalad over the transverse colon the lesser sac was entered and the splenocolic attachments were taken down.  The 2 dissections were joined at the white line of Toldt and the splenic flexure was completely released.    The right side of the peritoneum was scored and the avascular plane posterior to the superior hemorrhoidal artery was dissected the ureter and gonadal's were identified and swept out of harm's way the nerves were also swept out of harm's way the ana tissue was dissected off with the FAHAD this was taken at the aorta using the LigaSure the colon was completely medialized from the sigmoid colon and then we brought the robot in and docked toward the pelvis.    The posterior dissection was continued down to the pelvic floor with ease the patient had very avascular tissues.  We then scored the anterior peritoneal reflection we were able to find the vagina and stayed posterior to  Denonvillier's fascia this was continued down to the pelvic floor circumferentially without breaching the mesorectum.  At this point the rectum was completely freed from the levators we brought in the robotic green load stapler and the rectum was stapled just above the anorectal ring.  It required multiple squeezes of the stapler before would fire.  We then used a green reload and still did not get across the rectum and there was a couple millimeters and we used a blue reload after that.  The rectum was brought out of the pelvis the pelvis was irrigated and suctioned out there was absolutely no bleeding we grabbed the rectum with a Flor.  The right ovary was larger than the left ovary and there were no cysts.  It was decided to remove the ovary.  The ovary was grasped and taken off the right tube using the ligasure and removed through the stoma site and sent as right ovary.     We then increased the size of the fascial incision and finally were able to get the rectum out of the stoma site the previously dissected IMV was dissected free from the mesentery of the descending colon we could see the left colic and we dissected proximal to that the marginal was cut and had mild flow the descending colon was transected using Bainbridge Kocher and a knife.  Was decided to perform an end-to-end stapled colorectal anastomosis.  An 0 Prolene suture was used to create a pursestring around a 29 EEA stapler anvil the bowel was returned to the peritoneal cavity mesenteric orientation was ensured to be straight. The stapler was brought out through the end of the staple line the stapler was docked and fired there were 2 complete donuts.  Flexible sigmoidoscopy was performed which showed an anastomosis 3 cm from the dentate line that was pink and healthy on both sides without leak to air leak testing and no bleeding.    We then went back in laparoscopically a four-quadrant tap block was performed instilling 10 cc of quarter  percent Marcaine in the right upper quadrant right lower quadrant left upper quadrant left lower quadrant under direct vision the.  A 19 Nauruan Nauruan Vaibhav drain was placed brought out through the left sided robotic port and placed posterior to the anastomosis.  The small bowel just proximal to the ileocecal valve was grasped and brought out through the stoma site.      The port sites were closed using 3-0 Monocryl and covered using LiquiBand the stoma site was cinched down using interrupted figure-of-eight 0 PDS sutures and the stoma was matured over a ashvin using 3-0 Vicryl.    Complications:  None; patient tolerated the procedure well.    Disposition: PACU - hemodynamically stable.  Condition: stable         Attending Attestation: I performed the procedure.    Bryanna Presley  Phone Number: [phone]

## 2024-02-06 NOTE — CONSULTS
"  Ostomy Care Consult     Visit Date: 2/6/2024      Patient Name: Tracey Carranza         MRN: [id number]           YOB: 1965     WO nursing visit outcome: Leaking pouch was changed on POD #0. Pattern and additional set of supplies were provided. Ms. Carranza and her sister were attentive to pouching.      [facility] next scheduled visit/plan: to assess tomorrow    Stoma Type: Loop Ileostomy  Rickey: Yes - no sutured, tight tension  Diameter: 1 1/8\"  Location: RUQ  Protrusion: Flush  Mucosal Condition and Color: Moist, Red  Mucocutaneous Junction: Intact  Peristomal Skin:  slight pink in areas  Location of Skin Impairment: scattered  Peristomal Contour:  crease at rickey ends  Supportive Tissue: Soft  Character of Output:  dark green, slightly viscous  Emptying Frequency: TBD  Removed/Current Pouching System: 2 3/4\" ConvaTec moldable flat wafer, barrier ring, Natura Invisiclose pouch  Current Wearing Time: a few hours - leaked along rickey ends    Recommendations:   Skin Care: stoma powder to denuded skin as needed with pouch change  Pouching System: 2 1/4\" Polly New Image soft convex, stoma paste around aperture and to level rickey ends, Hollihesive wedge under each rickey end, lock n' roll pouch, wafer extender at medial and lateral edge  Wear Time: ultimately 3-4 Days, may change for teaching sooner  Other: belt will likely benefit patient    Drain: Vaibhav, LLQ, and Serosanguineous    Supplier:  TBD    Comments: n/a at this time    Time Increment: 15 minutes    Arti Silveira, KEYLAN, RN, CWOCN     2/6/2024  4:42 PM        "

## 2024-02-06 NOTE — CARE PLAN
The patient's goals for the shift include      The clinical goals for the shift include      Over the shift, the patient did not make progress toward the following goals. Barriers to progression include   Problem: Fall/Injury  Goal: Not fall by end of shift  Outcome: Progressing  Goal: Be free from injury by end of the shift  Outcome: Progressing  Goal: Verbalize understanding of personal risk factors for fall in the hospital  Outcome: Progressing  Goal: Verbalize understanding of risk factor reduction measures to prevent injury from fall in the home  Outcome: Progressing  Goal: Use assistive devices by end of the shift  Outcome: Progressing  Goal: Pace activities to prevent fatigue by end of the shift  Outcome: Progressing   . Recommendations to address these barriers include .

## 2024-02-06 NOTE — ANESTHESIA POSTPROCEDURE EVALUATION
Patient: Tracey Carranza    Procedure Summary       Date: 02/06/24 Room / Location: [facility] OR 08 / [facility] OR    Anesthesia Start: 0842 Anesthesia Stop: 1214    Procedure: Robot Assisted Laparoscopic Sigmoid Colon and Rectum Resection, Right Oophrectomy Diagnosis:       Rectal cancer (CMS/HCC)      (Rectal cancer (CMS/HCC) [C20])    Surgeons: Bryanna Presley MD Responsible Provider: Nikolay Joe MD    Anesthesia Type: general ASA Status: 2            Anesthesia Type: general    Vitals Value Taken Time   /56 02/06/24 1315   Temp 37.1 °C (98.8 °F) 02/06/24 1212   Pulse 60 02/06/24 1315   Resp 12 02/06/24 1315   SpO2 98 % 02/06/24 1315       Anesthesia Post Evaluation    Patient participation: complete - patient cannot participate  Level of consciousness: awake  Pain management: adequate  Airway patency: patent  Cardiovascular status: acceptable  Respiratory status: acceptable  Hydration status: acceptable  Postoperative Nausea and Vomiting: none        No notable events documented.

## 2024-02-06 NOTE — ANESTHESIA PROCEDURE NOTES
Airway  Date/Time: 2/6/2024 8:51 AM  Urgency: elective    Airway not difficult    Staffing  Performed: BETTYE   Authorized by: Nikolay Joe MD    Performed by: BETTYE Mark  Patient location during procedure: OR    Indications and Patient Condition  Indications for airway management: anesthesia  Spontaneous Ventilation: absent  Sedation level: deep  Preoxygenated: yes  Patient position: sniffing  Mask difficulty assessment: 1 - vent by mask  No planned trial extubation    Final Airway Details  Final airway type: endotracheal airway      Successful airway: ETT  Cuffed: yes   Successful intubation technique: direct laryngoscopy  Facilitating devices/methods: intubating stylet  Blade: Ruth Ann  Blade size: #3  ETT size (mm): 7.5  Cormack-Lehane Classification: grade I - full view of glottis  Placement verified by: chest auscultation and capnometry   Cuff volume (mL): 7  Measured from: lips  ETT to lips (cm): 22  Number of attempts at approach: 1    Additional Comments  atraumatic

## 2024-02-06 NOTE — ANESTHESIA PREPROCEDURE EVALUATION
Patient: Tracey Carranza    Procedure Information       Date/Time: 02/06/24 0830    Procedure: Robot Assisted Laparoscopic Sigmoid Colon and Rectum Resection    Location: [facility] 08 / [facility] OR    Surgeons: Bryanna Presley MD            Relevant Problems   GI   (+) Esophageal reflux   (+) Rectal adenocarcinoma (CMS/HCC)   (+) Rectal cancer (CMS/HCC)      Neuro/Psych   (+) Acute cervical radiculopathy   (+) Meralgia paresthetica of right side      GI/Hepatic   (+) Rectal adenocarcinoma (CMS/HCC)   (+) Rectal cancer (CMS/HCC)      Musculoskeletal   (+) Lumbosacral spondylosis without myelopathy   (+) RA (rheumatoid arthritis) (CMS/HCC)      Other   (+) Cervical arthritis   (+) RA (rheumatoid arthritis) (CMS/HCC)       Clinical information reviewed:   Tobacco  Allergies  Meds   Med Hx  Surg Hx   Fam Hx  Soc Hx        NPO Detail:  NPO/Void Status  Carbohydrate Drink Given Prior to Surgery? : N  Date of Last Liquid: 02/06/24  Time of Last Liquid: 0500  Date of Last Solid: 02/05/24  Time of Last Solid: 1800  Last Intake Type: Clear fluids; GI prep (apple cider)  Time of Last Void: 0640         Physical Exam    Airway  Mallampati: II  TM distance: >3 FB     Cardiovascular    Dental - normal exam     Pulmonary    Abdominal            Anesthesia Plan    History of general anesthesia?: yes  History of complications of general anesthesia?: no    ASA 2     general     The patient is a current smoker.    intravenous induction   Anesthetic plan and risks discussed with patient.

## 2024-02-06 NOTE — CONSULTS
Ostomy Care Consult     Visit Date: 2/6/2024      Patient Name: Tracey Carranza         MRN: [id number]           YOB: 1965     Outcome: Preoperative education and raul completed for Ileostomy    Abdomen assessed in sitting, standing, and lying position.   Raul made in RUQ and LUQ using Surgical Pen, Covered with Clear Dressing    Abdominal creases and scars were avoided. Lower abdomen drops, so is not a good location for the stoma. Dr. Presley is aware    Education provided:  Stoma Appearance  Purpose of pouch  Post-operative diet  Expectations for post-operative care/teaching by [facility] RN    Showed samples of pouches    Materials provided: educational booklet    Patient Response: verbalized understanding, has joined an ostomate group on the internet. Niece and sister were present during education and raul.     Time Increment: 60 minutes    KEYLA RyanN, RN, CWOCN   2/6/2024  8:20 AM

## 2024-02-06 NOTE — PERIOPERATIVE NURSING NOTE
1330 assuming care of pt at this time.  During assessment pt gown and under chux saturated from HOLA insertion site, Dr Presley notifed, new order to cover w and reinforce with ABD pad.    1345 dressing placed over HOLA insertion site, pt gown and linens changed.  Pt repositioned.    1400 pt tolerating sips of water    Patient name & assigned room #724 manjit Carranza  Procedure:robot assisted laparoscopic sigmoid colon and rectum resection  Anesthesia type (i.e. general, regional, MAC):general  Relevant PMH:barretts esophagus, HLD, hypothyroidism, rectal adenocarcinoma  Orientation/mental status:A&Ox3  Incision site(s)/location, surgical dressing(s), and drain type/location: pouch, HOLA drain, dressing reinfirced with abd pad MD after dressing became saturated  Fluids given in OR/PACU, IV site(s), and drips/fluids currently infusinmml/hr in PACU,   PO status (last oral intake):sips of water  Last set of vital signs & O2 requirements:3l/min nasal cannual  Current pain level & last pain/nausea medication dose/time given:3/min  Last void/Stephenson in place: stephenson to CD  SCDs on (yes/no):yes  Mode of transport (cart or bed):cart  Belongings sent with patient:yes  Emergency contact (name, relationship, phone number):mary [phone] report sent to 7th floor RN    1415 pt family updated with room assignment    4672 Patient discharged in stable condition to inpatient unit via bed.

## 2024-02-07 LAB
ANION GAP SERPL CALC-SCNC: 11 MMOL/L (ref 10–20)
BUN SERPL-MCNC: 10 MG/DL (ref 6–23)
CALCIUM SERPL-MCNC: 8.9 MG/DL (ref 8.6–10.3)
CHLORIDE SERPL-SCNC: 101 MMOL/L (ref 98–107)
CO2 SERPL-SCNC: 23 MMOL/L (ref 21–32)
CREAT SERPL-MCNC: 0.7 MG/DL (ref 0.5–1.05)
EGFRCR SERPLBLD CKD-EPI 2021: >90 ML/MIN/1.73M*2
ERYTHROCYTE [DISTWIDTH] IN BLOOD BY AUTOMATED COUNT: 14.4 % (ref 11.5–14.5)
GLUCOSE SERPL-MCNC: 122 MG/DL (ref 74–99)
HCT VFR BLD AUTO: 34.8 % (ref 36–46)
HGB BLD-MCNC: 10.9 G/DL (ref 12–16)
MCH RBC QN AUTO: 29.9 PG (ref 26–34)
MCHC RBC AUTO-ENTMCNC: 31.3 G/DL (ref 32–36)
MCV RBC AUTO: 96 FL (ref 80–100)
NRBC BLD-RTO: 0 /100 WBCS (ref 0–0)
PLATELET # BLD AUTO: 343 X10*3/UL (ref 150–450)
POTASSIUM SERPL-SCNC: 4.2 MMOL/L (ref 3.5–5.3)
RBC # BLD AUTO: 3.64 X10*6/UL (ref 4–5.2)
SODIUM SERPL-SCNC: 131 MMOL/L (ref 136–145)
WBC # BLD AUTO: 14.1 X10*3/UL (ref 4.4–11.3)

## 2024-02-07 PROCEDURE — 2500000002 HC RX 250 W HCPCS SELF ADMINISTERED DRUGS (ALT 637 FOR MEDICARE OP, ALT 636 FOR OP/ED): Performed by: COLON & RECTAL SURGERY

## 2024-02-07 PROCEDURE — 99232 SBSQ HOSP IP/OBS MODERATE 35: CPT

## 2024-02-07 PROCEDURE — 36415 COLL VENOUS BLD VENIPUNCTURE: CPT | Performed by: COLON & RECTAL SURGERY

## 2024-02-07 PROCEDURE — 85027 COMPLETE CBC AUTOMATED: CPT | Performed by: COLON & RECTAL SURGERY

## 2024-02-07 PROCEDURE — 2500000004 HC RX 250 GENERAL PHARMACY W/ HCPCS (ALT 636 FOR OP/ED)

## 2024-02-07 PROCEDURE — 2500000001 HC RX 250 WO HCPCS SELF ADMINISTERED DRUGS (ALT 637 FOR MEDICARE OP): Performed by: COLON & RECTAL SURGERY

## 2024-02-07 PROCEDURE — 80048 BASIC METABOLIC PNL TOTAL CA: CPT | Performed by: COLON & RECTAL SURGERY

## 2024-02-07 PROCEDURE — 2500000005 HC RX 250 GENERAL PHARMACY W/O HCPCS

## 2024-02-07 PROCEDURE — 2500000001 HC RX 250 WO HCPCS SELF ADMINISTERED DRUGS (ALT 637 FOR MEDICARE OP)

## 2024-02-07 PROCEDURE — 1100000001 HC PRIVATE ROOM DAILY

## 2024-02-07 PROCEDURE — 2500000004 HC RX 250 GENERAL PHARMACY W/ HCPCS (ALT 636 FOR OP/ED): Performed by: COLON & RECTAL SURGERY

## 2024-02-07 RX ORDER — LIDOCAINE 560 MG/1
1 PATCH PERCUTANEOUS; TOPICAL; TRANSDERMAL DAILY
Status: DISCONTINUED | OUTPATIENT
Start: 2024-02-07 | End: 2024-02-13 | Stop reason: HOSPADM

## 2024-02-07 RX ORDER — METHOCARBAMOL 100 MG/ML
1000 INJECTION, SOLUTION INTRAMUSCULAR; INTRAVENOUS EVERY 8 HOURS
Status: DISCONTINUED | OUTPATIENT
Start: 2024-02-07 | End: 2024-02-13 | Stop reason: HOSPADM

## 2024-02-07 RX ORDER — CALCIUM CARBONATE 200(500)MG
1000 TABLET,CHEWABLE ORAL 4 TIMES DAILY PRN
Status: DISCONTINUED | OUTPATIENT
Start: 2024-02-07 | End: 2024-02-13 | Stop reason: HOSPADM

## 2024-02-07 RX ADMIN — ACETAMINOPHEN 650 MG: 325 TABLET ORAL at 14:33

## 2024-02-07 RX ADMIN — OXYCODONE HYDROCHLORIDE 10 MG: 5 TABLET ORAL at 14:34

## 2024-02-07 RX ADMIN — ACETAMINOPHEN 650 MG: 325 TABLET ORAL at 08:29

## 2024-02-07 RX ADMIN — KETOROLAC TROMETHAMINE 15 MG: 30 INJECTION, SOLUTION INTRAMUSCULAR; INTRAVENOUS at 12:00

## 2024-02-07 RX ADMIN — GABAPENTIN 300 MG: 300 CAPSULE ORAL at 08:29

## 2024-02-07 RX ADMIN — KETOROLAC TROMETHAMINE 15 MG: 30 INJECTION, SOLUTION INTRAMUSCULAR; INTRAVENOUS at 05:23

## 2024-02-07 RX ADMIN — ACETAMINOPHEN 650 MG: 325 TABLET ORAL at 20:55

## 2024-02-07 RX ADMIN — OXYCODONE HYDROCHLORIDE 10 MG: 5 TABLET ORAL at 03:40

## 2024-02-07 RX ADMIN — CALCIUM CARBONATE (ANTACID) CHEW TAB 500 MG 1000 MG: 500 CHEW TAB at 03:47

## 2024-02-07 RX ADMIN — KETOROLAC TROMETHAMINE 15 MG: 30 INJECTION, SOLUTION INTRAMUSCULAR; INTRAVENOUS at 23:48

## 2024-02-07 RX ADMIN — METHOCARBAMOL 1000 MG: 1000 INJECTION, SOLUTION INTRAMUSCULAR; INTRAVENOUS at 11:04

## 2024-02-07 RX ADMIN — PANTOPRAZOLE SODIUM 40 MG: 40 TABLET, DELAYED RELEASE ORAL at 08:29

## 2024-02-07 RX ADMIN — KETOROLAC TROMETHAMINE 15 MG: 30 INJECTION, SOLUTION INTRAMUSCULAR; INTRAVENOUS at 18:06

## 2024-02-07 RX ADMIN — ACETAMINOPHEN 650 MG: 325 TABLET ORAL at 03:39

## 2024-02-07 RX ADMIN — GABAPENTIN 300 MG: 300 CAPSULE ORAL at 14:33

## 2024-02-07 RX ADMIN — METHOCARBAMOL 1000 MG: 1000 INJECTION, SOLUTION INTRAMUSCULAR; INTRAVENOUS at 18:06

## 2024-02-07 RX ADMIN — AMITRIPTYLINE HYDROCHLORIDE 25 MG: 25 TABLET, FILM COATED ORAL at 20:55

## 2024-02-07 RX ADMIN — GABAPENTIN 300 MG: 300 CAPSULE ORAL at 20:55

## 2024-02-07 RX ADMIN — ENOXAPARIN SODIUM 40 MG: 40 INJECTION SUBCUTANEOUS at 14:33

## 2024-02-07 RX ADMIN — OXYCODONE HYDROCHLORIDE 10 MG: 5 TABLET ORAL at 08:43

## 2024-02-07 RX ADMIN — LIDOCAINE 1 PATCH: 4 PATCH TOPICAL at 10:16

## 2024-02-07 RX ADMIN — LEVOTHYROXINE SODIUM 88 MCG: 88 TABLET ORAL at 08:29

## 2024-02-07 RX ADMIN — ATORVASTATIN CALCIUM 40 MG: 40 TABLET, FILM COATED ORAL at 08:29

## 2024-02-07 ASSESSMENT — COGNITIVE AND FUNCTIONAL STATUS - GENERAL
DRESSING REGULAR UPPER BODY CLOTHING: A LITTLE
STANDING UP FROM CHAIR USING ARMS: A LITTLE
WALKING IN HOSPITAL ROOM: A LITTLE
DRESSING REGULAR LOWER BODY CLOTHING: A LITTLE
MOVING FROM LYING ON BACK TO SITTING ON SIDE OF FLAT BED WITH BEDRAILS: A LITTLE
TURNING FROM BACK TO SIDE WHILE IN FLAT BAD: A LITTLE
MOBILITY SCORE: 17
MOVING TO AND FROM BED TO CHAIR: A LITTLE
TOILETING: A LITTLE
HELP NEEDED FOR BATHING: A LITTLE
CLIMB 3 TO 5 STEPS WITH RAILING: A LOT
DAILY ACTIVITIY SCORE: 20

## 2024-02-07 ASSESSMENT — PAIN SCALES - GENERAL
PAINLEVEL_OUTOF10: 7
PAINLEVEL_OUTOF10: 5 - MODERATE PAIN
PAINLEVEL_OUTOF10: 2
PAINLEVEL_OUTOF10: 10 - WORST POSSIBLE PAIN
PAINLEVEL_OUTOF10: 3

## 2024-02-07 ASSESSMENT — ACTIVITIES OF DAILY LIVING (ADL): LACK_OF_TRANSPORTATION: NO

## 2024-02-07 ASSESSMENT — PAIN - FUNCTIONAL ASSESSMENT
PAIN_FUNCTIONAL_ASSESSMENT: 0-10

## 2024-02-07 ASSESSMENT — PAIN DESCRIPTION - LOCATION
LOCATION: ABDOMEN
LOCATION: ABDOMEN

## 2024-02-07 NOTE — PROGRESS NOTES
Nutrition Progress Note    RDN consulted for diet education.   Met with pt earlier today, pt's sister at bedside.   Pt very receptive to diet education, and verbalized good understanding. Pt mentioned she pre-planned and purchased low fiber foods so she would have them in the home.   Pt has Ensure Clear at home. Suggest she drink this between meals, even if it's just 4-ounces at one time. Pt receptive.   All questions answered, encouraged pt to reach out with any questions.     Dietary Orders (From admission, onward)       Start     Ordered    02/06/24 1438  Adult diet Low fiber  Diet effective now        Question:  Diet type  Answer:  Low fiber    02/06/24 1437    02/06/24 1438  Oral Supplements - Ensure Surgery  Until discontinued        Comments: Drink 3 cartons daily for 5 days. Nutrition services please send 15 cartons ONE TIME only.   Question:  Select supplement:  Answer:  Ezio    02/06/24 1437                  Wt Readings from Last 10 Encounters:   02/07/24 91.5 kg (201 lb 11.5 oz)   02/01/24 90 kg (198 lb 6.6 oz)   01/17/24 92.1 kg (203 lb 2.5 oz)   01/10/24 91.2 kg (201 lb)   01/08/24 92.5 kg (204 lb)   12/20/23 92.8 kg (204 lb 9.4 oz)   10/27/23 93.4 kg (206 lb)   07/27/23 98.2 kg (216 lb 9.6 oz)   02/09/23 102 kg (224 lb)   01/25/23 106 kg (233 lb 11 oz)     Ht: 165.1 cm      Latest Reference Range & Units 02/07/24 05:54   GLUCOSE 74 - 99 mg/dL 122 (H)   SODIUM 136 - 145 mmol/L 131 (L)   POTASSIUM 3.5 - 5.3 mmol/L 4.2   CHLORIDE 98 - 107 mmol/L 101   Bicarbonate 21 - 32 mmol/L 23   Anion Gap 10 - 20 mmol/L 11   Blood Urea Nitrogen 6 - 23 mg/dL 10   Creatinine 0.50 - 1.05 mg/dL 0.70   EGFR >60 mL/min/1.73m*2 >90   Calcium 8.6 - 10.3 mg/dL 8.9   (H): Data is abnormally high  (L): Data is abnormally low

## 2024-02-07 NOTE — PROGRESS NOTES
02/07/24 1353   Current Planned Discharge Disposition   Current Planned Discharge Disposition Home H

## 2024-02-07 NOTE — PROGRESS NOTES
02/07/24 1353   [facility] Disability Status   Are you deaf or do you have serious difficulty hearing? N   Are you blind or do you have serious difficulty seeing, even when wearing glasses? N   Because of a physical, mental, or emotional condition, do you have serious difficulty concentrating, remembering, or making decisions? (5 years old or older) N   Do you have serious difficulty walking or climbing stairs? N   Do you have serious difficulty dressing or bathing? N   Because of a physical, mental, or emotional condition, do you have serious difficulty doing errands alone such as visiting the doctor? N

## 2024-02-07 NOTE — PROGRESS NOTES
02/07/24 1347   Discharge Planning   Living Arrangements Alone   Support Systems Family members   Assistance Needed Independent   Type of Residence Private residence   Number of Stairs to Enter Residence 1   Number of Stairs Within Residence 13   Do you have animals or pets at home? Yes   Type of Animals or Pets 2 dogs   Who is requesting discharge planning? Provider   Home or Post Acute Services In home services   Type of Home Care Services Home nursing visits   Patient expects to be discharged to: Home   Does the patient need discharge transport arranged? No   Financial Resource Strain   How hard is it for you to pay for the very basics like food, housing, medical care, and heating? Not hard   Housing Stability   In the last 12 months, was there a time when you were not able to pay the mortgage or rent on time? N   In the last 12 months, how many places have you lived? 1   In the last 12 months, was there a time when you did not have a steady place to sleep or slept in a shelter (including now)? N   Transportation Needs   In the past 12 months, has lack of transportation kept you from medical appointments or from getting medications? no   In the past 12 months, has lack of transportation kept you from meetings, work, or from getting things needed for daily living? No   Patient Choice   Provider Choice list and CMS website (https://medicare.gov/care-compare#search) for post-acute Quality and Resource Measure Data were provided and reviewed with: Patient   Patient / Family choosing to utilize agency / facility established prior to hospitalization Yes     Met with patient at the bedside to discuss discharge plan.  Patient is day 1 of rectal adenocarcinoma and POD 1 from laparoscopic sigmoid colon and rectum resection and new colostomy.  She lives alone with her 2 dogs and her niece, Xin will transport home at discharge.  Patient has a HOLA drain and stoma ashvin intact.  Patient and niece to receive teaching with  ostomy nurse.  Patient would like [facility].  Secure chat to surgical MARNIE for homecare referral.  PCP: Dr. Marina Coulter  ADOD: Friday  Guerline Lal RN

## 2024-02-07 NOTE — PROGRESS NOTES
"Tracey Carranza is a 58 y.o. female on day 1 of admission presenting with Rectal adenocarcinoma (CMS/HCC).    Subjective   Couldn't sleep last night due to pain. Does not feel pain regimen is helping much. Tolerating food without any issues, denies N/V.        Objective     PE:  Constitutional: calm and cooperative, NAD  Eyes: PERRL, clear sclera   ENMT: Moist mucous membranes  Head/Neck: Neck supple, no JVD  Cardiovascular: RRR. 2+ equal pulses of the distal extremities.  Respiratory/Thorax: CTAB. Good symmetric chest expansion. On RA  Gastrointestinal: Abdomen slightly distended, soft, appropriately tender, no peritoneal signs. Laparotomy site c/d/i, well approximate with Dermabond, no erythema or drainage. HOLA with RUQ with serosang output. Stoma beefy red, moist, producing a small amount of dark green/brown liquidy stool, + gas, well pouched  Genitourinary: Alexander in place with clear yellow urine  Musculoskeletal: ROM intact, no joint swelling, normal strength  Extremities: No peripheral edema  Neurological: A&Ox3, No focal deficits   Psychological: Appropriate mood and behavior  Skin: Warm and dry      Last Recorded Vitals  Blood pressure 109/67, pulse 75, temperature 36.5 °C (97.7 °F), temperature source Oral, resp. rate 18, height 1.651 m (5' 5\"), weight 91.5 kg (201 lb 11.5 oz), SpO2 96 %.  Intake/Output last 3 Shifts:  I/O last 3 completed shifts:  In: 2342.7 (25.6 mL/kg) [I.V.:2342.7 (25.6 mL/kg)]  Out: 1215 (13.3 mL/kg) [Urine:975 (0.3 mL/kg/hr); Drains:205; Stool:10; Blood:25]  Weight: 91.5 kg     Relevant Results  Scheduled medications  acetaminophen, 650 mg, oral, q6h  amitriptyline, 25 mg, oral, Nightly  atorvastatin, 40 mg, oral, Daily  enoxaparin, 40 mg, subcutaneous, q24h  gabapentin, 300 mg, oral, TID  ketorolac, 15 mg, intravenous, q6h ROSALBA  levothyroxine, 88 mcg, oral, Daily before breakfast  lidocaine, 1 patch, transdermal, Daily  oxygen, , inhalation, Continuous - 02/gases  pantoprazole, 40 mg, " oral, Daily      Continuous medications  sodium chloride 0.9%, 40 mL/hr, Last Rate: 40 mL/hr (02/07/24 1003)      PRN medications  PRN medications: calcium carbonate, HYDROmorphone, naloxone, ondansetron ODT **OR** ondansetron, oxyCODONE, oxyCODONE  Results for orders placed or performed during the hospital encounter of 02/06/24 (from the past 24 hour(s))   Basic Metabolic Panel   Result Value Ref Range    Glucose 122 (H) 74 - 99 mg/dL    Sodium 131 (L) 136 - 145 mmol/L    Potassium 4.2 3.5 - 5.3 mmol/L    Chloride 101 98 - 107 mmol/L    Bicarbonate 23 21 - 32 mmol/L    Anion Gap 11 10 - 20 mmol/L    Urea Nitrogen 10 6 - 23 mg/dL    Creatinine 0.70 0.50 - 1.05 mg/dL    eGFR >90 >60 mL/min/1.73m*2    Calcium 8.9 8.6 - 10.3 mg/dL   CBC   Result Value Ref Range    WBC 14.1 (H) 4.4 - 11.3 x10*3/uL    nRBC 0.0 0.0 - 0.0 /100 WBCs    RBC 3.64 (L) 4.00 - 5.20 x10*6/uL    Hemoglobin 10.9 (L) 12.0 - 16.0 g/dL    Hematocrit 34.8 (L) 36.0 - 46.0 %    MCV 96 80 - 100 fL    MCH 29.9 26.0 - 34.0 pg    MCHC 31.3 (L) 32.0 - 36.0 g/dL    RDW 14.4 11.5 - 14.5 %    Platelets 343 150 - 450 x10*3/uL         Assessment/Plan   Principal Problem:    Rectal adenocarcinoma (CMS/HCC)  Active Problems:    Rectal cancer (CMS/HCC)    Tracey Carranza is a 58 y.o. female on day 1 of admission presenting with Rectal adenocarcinoma (CMS/HCC). She is now POD 1 from laparoscopic sigmoid colon and rectum resection, right oophorectomy. Intra operative findings show no metastatic disease, No reach issues, no leak to air leak testing, both sides of the anastomosis appear healthy and pink without bleeding.     A/P: POD 1    Neuro: Acute post op pain  - Not naive to narcotics due to the amount of previous surgeries. Current regimen not providing enough relief.        - Scheduled Tylenol, toradol, and gabapentin. Added lidocaine patches and robaxin       - PRN oxycodone Q4 hours, dilaudid Q2.   - OOB/ ambulate minimum 5x per day     CV: RRR,  normotensive  Hx: HLD  - VS every 4 hours   - continue home lipitor     Pulm: CTAB, on RA  - IS every hour while awake   - Pulse ox every 4 hours with VS     GI: POD 1 laparoscopic sigmoid colon and rectum resection, right oophorectomy. HOLA with serosanguinous- 205 ml since OR. Stoma beefy red, moist, producing dark green/brown liquid stool, and gas. Stoma ashvin in place. Tolerating diet. Complains of acid reflux  Hx: Barretts esophagus, rectal cancer  - continue low fiber diet and ensure surgery mauricio ECHAVARRIA protocol  - dietician consulted   - enterostomal RN following, likely remove ashvin pod3  - HOLA drain care  - PRN antiemetic   - PPI daily   - PRN TUMS  - chew gum    : Stephenson in place draining clear yellow urine- urine output adequate  - keep stephenson for now  - dc IVF  - Monitor and record I&Os   - creatinine 0.7  - lytes WNL  - daily BMP    HEME: H&H stable 10.9/34.8  - DVT Proph: SCDs/ ambulate/ lovenox  - no s/s of bleeding      Endo: no acute issues  Hx: hypothyroidism  - Continue home synthroid     ID: WBC 14.1, reactive 2/2 surgery   - Monitor for s/s infection    Dispo: Discussed with Dr. Presley. OOB/ambulating today, better pain control.        I spent 35 minutes in the professional and overall care of this patient.      Elise Norwood, APRN-CNP      Pt overall feeling much better walked around to the end of the boone, ate well today, stoma working    Gen - well appearing NAD  Abd - softly distended, drain with SSD,stoma flat with brown liquid out, incisions CDI  Extr - No edema     Impression - Pt POD # 1 s/p LAR with DLI - doing well   Plan   OOB/IS  Lovenox/SCD's - will need lovenox for home  Labs tomorrow  Stephenson out at 6 am tomorrow   Soft diet - stoma seeing

## 2024-02-07 NOTE — DISCHARGE INSTRUCTIONS
Instructions After Laparoscopic Surgery    Wound Care:  - Ice packs to wounds every hour the first day  - Ok to shower 24 hours after surgery  - No baths or swimming for 2 weeks  - Keep wound clean and dry  - Do not apply topical creams or ointments  - Call if you notice redness around wound, foul-smelliing drainage, or increasing pain     Diet:          - GI soft, low residual as discussed with Dietary          - Ensure Surgery Shakes 3 times a day for 5 days    Activity          - Take it easy for the first 48 hours          - Stairs and walks are fine          - Resume activities gradually over the first week          - Ask Dr Presley before resuming strenuous physical exertions          - No driving while on pain medication    Medications          - Pain medicine prescription attached for severe pain          - Please take Motrin/ Ibuprofen 600 mg every 6 hours scheduled for 2-3 days, then every 6 hours as needed          - Please take Tylenol 625 mg every 6 hours scheduled for 2-3 days, then every 6 hours as needed          - Resume your home medications unless otherwise directed          - Oxycodone 5mg every 6 hours as needed for pain    Other Instructions          - Call to make appointment within 1-2 days: [phone], if one has not already been made for you          - Call the doctor for the following:   Severe unrelieved pain   Fevers > 101F   Nausea/vomiting   Wound issues   Insurance/return to work forms   Shortness of breath   Chest pains    Other Instructions:  It is important to drink adequate fluid and avoid dehydration. Signs of dehydration include dark urine, decreased frequency in urine, pale mucous membrane, or dry mucous membranes. You should drink at least 8 8oz glasses of fluids a day and it should be a variety of fluids (water, juice, tea, coffee, etc.).  If you start to experience nausea, emesis, fever, or abdominal pain please call Dr. Presley's office [phone].    Your ileostomy  is formed from the small intestine.  Your stoma should function around the clock with the passing of gas or stool.  If you become bloated and have no output from stoma in several hours call the office.   The goal of the ileostomy output is an oatmeal or pudding consistency. Output should be less than 1000cc (one liter) per 24 hours. You may empty your pouch when it is half full, on average four times per day.    Measure the output each time you empty the pouch, and record the amount on the tally sheet given to you. Calculate a 24 hour total at the same time each day.      Drink plenty of a variety of fluids to prevent dehydration. Signs of dehydration are: dry mouth, dark yellow urine in small amounts, and dizziness with change in position or increased feeling of weakness/tiredness.     Soft diet (no raw fruits or vegetables; food should be soft enough to smash or cut with a fork) for 5-7 days after hospital discharge.  After this time you should transition to your regular diet.    For diarrhea stools incorporate foods from the BRAT diet.  This is a bland diet that consists of foods low in fiber: bananas, rice, applesauce and toast are staples of the diet.  You can also add tea, tapioca and yogurt if you would like.  Be sure to drink plenty of fluids if you are having diarrhea, as you can become dehydrated quickly.     If you have diarrhea or have an ileostomy that is putting out very watery stool (output exceeds 1000 ml in 24 hours):   Please incorporate the following foods into your diet.  These foods add bulk to your stools or thicken up the stools  o Creamy peanut butter on toast or crackers  o Yogurt  o Bananas  o Applesauce  o Rice  o Pasta  o Pretzels  o Marshmallows   Avoid foods high in sugar and caffeine.  Examples: soda, coffee, cakes, pies, cookies   Avoid eating and drinking at the same time.  Ingest fluids about 30-45 minutes after solid food   Imodium:  Take 1-2 tablets by mouth ½ hour before  "breakfast, lunch, dinner, and at bedtime as needed.   Do not exceed 8 tablets in 24 hours.   Lomotil:  Take 1-2 tablets by mouth ½ hour before breakfast, lunch, dinner, and at bedtime as needed.  Do not exceed 8 tablets in 24 hours.   Metamucil fiber cookies:  Metamucil fiber cookies are used to add bulk to your stools and therefore stopping diarrhea.   You will need to doctor yourself to determine how many of these cookies you need per day.   Please drink 64 oz of G2 (Gatorade electrolyte beverage) per day.     HOW TO CHANGE YOUR Ileostomy POUCH  Gather Supplies:  1. Soft paper towels or washcloths  2. Non-lotion soap (Ivory® or Dial® are recommended)  3. Scissors with blunt tip  4. Baseplate/Wafer: Coloplast Bennett Flex Convex large \"red\"  5. Drainable Coloplast Roland Flex red   6. Accessory products: measuring guide, adhesive removers, stoma paste, stoma powder, belt  Prepare the New Pouch:  1. Trace the pattern onto the cover paper on the back of the base plate. (Note: the hole should be 1/8\" larger than the stoma)  2. Cut the hole out of the baseplate.  3. Remove the cover paper from the back of the baseplate.  4. Apply a bead of stoma paste to opening on the back of the wafer.  5. Set the baseplate aside with the sticky side facing up.  6. Close the pouch and set it aside.  Remove the Worn Pouch:  1. Empty the contents of the worn pouch into the toilet.   2. Wipe the end of the pouch clean using damp toilet tissue or paper towel.  3. Use the adhesive remover wipe or a soapy cloth and gentle pressure to remove the worn pouch from the skin.  Clean the Skin:  1. Wash the skin around the stoma with non-lotion soap.  2. Rinse the skin with warm water.  3. Pat the skin dry using a dry washcloth or paper towel.  4. If needed, apply stoma powder to any red or irritated skin.   5. Firmly brush the excess powder off.  Apply the New Pouch:  1. Center the hole of the baseplate around the stoma and press into place.   2. Remove " the cover paper from the adhesive surface on the back of the pouch.   3. Attach the pouch securely to the landing pad on the wafer.   4. Apply warm hand over the pouch for a few moments.  5. If directed: attach belt snugly, but not too tightly (2 fingers should fit between belt and abdomen).  Check the size of your stoma at least weekly for the first 6 weeks following surgery.   The best time to change your pouch is first thing in the morning, before you have eaten or had anything to drink.  Change your pouch every 3-4 days (twice a week) for the first 6 weeks after surgery.   If you have questions about changing your pouch or if you encounter leaks or irritated skin please contact your Ostomy Nurse by calling [phone].

## 2024-02-07 NOTE — POST-PROCEDURE NOTE
Ms. Carranza is a 58 year old female who is POD #0 from a robot assisted laparoscopic sigmoid colon and rectum resection and right oophorectomy (Intraoperative Findings: No metastatic disease, no reach issues, no leak to air leak testing, both sides of the anastomosis appear healthy and pink without bleeding). She is endorsing abdominal discomfort, especially upon movement or coughing. She denies any nausea or vomiting. She does have a HOLA and stephenson catheter in place at the time of my assessment.     PE:  Constitutional: A&Ox3, calm and cooperative, NAD.  Eyes: PERRL, clear sclera.  ENMT: Moist mucous membranes.  Head/Neck: Neck supple.  Cardiovascular: Normal rate and regular rhythm.   Respiratory/Thorax: Unlabored breathing.  Gastrointestinal: Abdomen slightly distended, soft, appropriately tender to palpation, no peritoneal signs, laparotomy sites c/d/i, well approximated with Dermabond, no erythema or drainage. Ostomy beefy red and well perfused, slightly retracted. Liquid green stool in bag. HOLA present with sanguinous output in bulb.  Genitourinary: Stephenson catheter in place draining mary colored urine.  Musculoskeletal: ROM intact.  Neurological: A&Ox3, No focal deficits.  Psychological: Appropriate mood and behavior.  Skin: Warm and dry.    Radiology and labs reviewed. VSS, afebrile.    Plan:   - Diet: Low Fiber. Ensure Surgery (Ezio) TID x5 days  - Chewing gum  - IVF  - Continue current pain regimen   - PRN antiemetic   - DVT Proph: SCDs/ ambulate/ Lovenox  - Daily PPI  - Maintain stephenson catheter. Monitor and record output.  - HOLA drain care every shift and as needed. Monitor and record output.  - Monitor and record ostomy output.  - Enterostomal RN consulted - ashvin in place.  - Monitor VS every 4 hours   - Labs ordered for AM   - IS every hour while awake   - Encourage ambulation / OOB as tolerated   - Monitor lap sites for drainage, erythema, excessive bruising   - Daily weights  - Dietician consulted  - F/u  with Dr. Presley outpatient once d/c from hospital     Dispo: Pain control. OOB as able. Monitor and record stephenson, HOLA, and ostomy outputs.

## 2024-02-07 NOTE — CARE PLAN
The patient's goals for the shift include      The clinical goals for the shift include patient to ambulate safely with PT/OT today    Patient was able to ambulate safely today with PT around the halls.  Pain has been well managed. Ileostomy, teagan drain, and stephenson all emptied and documented at end of shift. Lying in bed comfortably watching television at end of shift.    Problem: Fall/Injury  Goal: Not fall by end of shift  Outcome: Met  Goal: Be free from injury by end of the shift  Outcome: Met  Goal: Verbalize understanding of personal risk factors for fall in the hospital  Outcome: Met  Goal: Verbalize understanding of risk factor reduction measures to prevent injury from fall in the home  Outcome: Met  Goal: Use assistive devices by end of the shift  Outcome: Met  Goal: Pace activities to prevent fatigue by end of the shift  Outcome: Met

## 2024-02-08 ENCOUNTER — APPOINTMENT (OUTPATIENT)
Dept: CARDIOLOGY | Facility: HOSPITAL | Age: 59
DRG: 330 | End: 2024-02-08
Payer: MEDICARE

## 2024-02-08 LAB
ANION GAP SERPL CALC-SCNC: 13 MMOL/L (ref 10–20)
BUN SERPL-MCNC: 10 MG/DL (ref 6–23)
CALCIUM SERPL-MCNC: 8.9 MG/DL (ref 8.6–10.3)
CHLORIDE SERPL-SCNC: 99 MMOL/L (ref 98–107)
CO2 SERPL-SCNC: 21 MMOL/L (ref 21–32)
CREAT SERPL-MCNC: 0.87 MG/DL (ref 0.5–1.05)
EGFRCR SERPLBLD CKD-EPI 2021: 77 ML/MIN/1.73M*2
ERYTHROCYTE [DISTWIDTH] IN BLOOD BY AUTOMATED COUNT: 14.1 % (ref 11.5–14.5)
GLUCOSE SERPL-MCNC: 97 MG/DL (ref 74–99)
HCT VFR BLD AUTO: 34.9 % (ref 36–46)
HGB BLD-MCNC: 11.2 G/DL (ref 12–16)
MCH RBC QN AUTO: 30.2 PG (ref 26–34)
MCHC RBC AUTO-ENTMCNC: 32.1 G/DL (ref 32–36)
MCV RBC AUTO: 94 FL (ref 80–100)
NRBC BLD-RTO: 0 /100 WBCS (ref 0–0)
PLATELET # BLD AUTO: 355 X10*3/UL (ref 150–450)
POTASSIUM SERPL-SCNC: 3.9 MMOL/L (ref 3.5–5.3)
RBC # BLD AUTO: 3.71 X10*6/UL (ref 4–5.2)
SODIUM SERPL-SCNC: 129 MMOL/L (ref 136–145)
WBC # BLD AUTO: 11.7 X10*3/UL (ref 4.4–11.3)

## 2024-02-08 PROCEDURE — 2500000001 HC RX 250 WO HCPCS SELF ADMINISTERED DRUGS (ALT 637 FOR MEDICARE OP)

## 2024-02-08 PROCEDURE — 85027 COMPLETE CBC AUTOMATED: CPT

## 2024-02-08 PROCEDURE — 2500000004 HC RX 250 GENERAL PHARMACY W/ HCPCS (ALT 636 FOR OP/ED): Performed by: COLON & RECTAL SURGERY

## 2024-02-08 PROCEDURE — 36415 COLL VENOUS BLD VENIPUNCTURE: CPT

## 2024-02-08 PROCEDURE — 1100000001 HC PRIVATE ROOM DAILY

## 2024-02-08 PROCEDURE — 2500000001 HC RX 250 WO HCPCS SELF ADMINISTERED DRUGS (ALT 637 FOR MEDICARE OP): Performed by: COLON & RECTAL SURGERY

## 2024-02-08 PROCEDURE — 2500000004 HC RX 250 GENERAL PHARMACY W/ HCPCS (ALT 636 FOR OP/ED)

## 2024-02-08 PROCEDURE — 2500000005 HC RX 250 GENERAL PHARMACY W/O HCPCS: Performed by: COLON & RECTAL SURGERY

## 2024-02-08 PROCEDURE — 93005 ELECTROCARDIOGRAM TRACING: CPT

## 2024-02-08 PROCEDURE — 2500000005 HC RX 250 GENERAL PHARMACY W/O HCPCS

## 2024-02-08 PROCEDURE — 9420000001 HC RT PATIENT EDUCATION 5 MIN

## 2024-02-08 PROCEDURE — 99233 SBSQ HOSP IP/OBS HIGH 50: CPT

## 2024-02-08 PROCEDURE — 80048 BASIC METABOLIC PNL TOTAL CA: CPT

## 2024-02-08 PROCEDURE — 2500000002 HC RX 250 W HCPCS SELF ADMINISTERED DRUGS (ALT 637 FOR MEDICARE OP, ALT 636 FOR OP/ED): Performed by: COLON & RECTAL SURGERY

## 2024-02-08 RX ADMIN — CALCIUM CARBONATE (ANTACID) CHEW TAB 500 MG 1000 MG: 500 CHEW TAB at 22:15

## 2024-02-08 RX ADMIN — ACETAMINOPHEN 650 MG: 325 TABLET ORAL at 14:38

## 2024-02-08 RX ADMIN — ACETAMINOPHEN 650 MG: 325 TABLET ORAL at 22:15

## 2024-02-08 RX ADMIN — ACETAMINOPHEN 650 MG: 325 TABLET ORAL at 08:38

## 2024-02-08 RX ADMIN — GABAPENTIN 300 MG: 300 CAPSULE ORAL at 22:15

## 2024-02-08 RX ADMIN — KETOROLAC TROMETHAMINE 15 MG: 30 INJECTION, SOLUTION INTRAMUSCULAR; INTRAVENOUS at 11:36

## 2024-02-08 RX ADMIN — METHOCARBAMOL 1000 MG: 1000 INJECTION, SOLUTION INTRAMUSCULAR; INTRAVENOUS at 11:36

## 2024-02-08 RX ADMIN — PANTOPRAZOLE SODIUM 40 MG: 40 TABLET, DELAYED RELEASE ORAL at 08:38

## 2024-02-08 RX ADMIN — GABAPENTIN 300 MG: 300 CAPSULE ORAL at 14:38

## 2024-02-08 RX ADMIN — OXYCODONE HYDROCHLORIDE 10 MG: 5 TABLET ORAL at 08:45

## 2024-02-08 RX ADMIN — ATORVASTATIN CALCIUM 40 MG: 40 TABLET, FILM COATED ORAL at 08:38

## 2024-02-08 RX ADMIN — METHOCARBAMOL 1000 MG: 1000 INJECTION, SOLUTION INTRAMUSCULAR; INTRAVENOUS at 17:54

## 2024-02-08 RX ADMIN — ENOXAPARIN SODIUM 40 MG: 40 INJECTION SUBCUTANEOUS at 14:38

## 2024-02-08 RX ADMIN — ACETAMINOPHEN 650 MG: 325 TABLET ORAL at 02:41

## 2024-02-08 RX ADMIN — AMITRIPTYLINE HYDROCHLORIDE 25 MG: 25 TABLET, FILM COATED ORAL at 22:15

## 2024-02-08 RX ADMIN — GABAPENTIN 300 MG: 300 CAPSULE ORAL at 08:38

## 2024-02-08 RX ADMIN — Medication: at 08:00

## 2024-02-08 RX ADMIN — METHOCARBAMOL 1000 MG: 1000 INJECTION, SOLUTION INTRAMUSCULAR; INTRAVENOUS at 02:45

## 2024-02-08 RX ADMIN — LEVOTHYROXINE SODIUM 88 MCG: 88 TABLET ORAL at 06:07

## 2024-02-08 RX ADMIN — LIDOCAINE 1 PATCH: 4 PATCH TOPICAL at 08:42

## 2024-02-08 RX ADMIN — KETOROLAC TROMETHAMINE 15 MG: 30 INJECTION, SOLUTION INTRAMUSCULAR; INTRAVENOUS at 17:54

## 2024-02-08 RX ADMIN — KETOROLAC TROMETHAMINE 15 MG: 30 INJECTION, SOLUTION INTRAMUSCULAR; INTRAVENOUS at 06:07

## 2024-02-08 RX ADMIN — OXYCODONE HYDROCHLORIDE 10 MG: 5 TABLET ORAL at 04:31

## 2024-02-08 RX ADMIN — ONDANSETRON 4 MG: 2 INJECTION INTRAMUSCULAR; INTRAVENOUS at 17:56

## 2024-02-08 ASSESSMENT — COGNITIVE AND FUNCTIONAL STATUS - GENERAL
TOILETING: A LITTLE
DAILY ACTIVITIY SCORE: 22
TOILETING: A LITTLE
MOBILITY SCORE: 22
DRESSING REGULAR LOWER BODY CLOTHING: A LITTLE
DRESSING REGULAR UPPER BODY CLOTHING: A LITTLE
HELP NEEDED FOR BATHING: A LITTLE
WALKING IN HOSPITAL ROOM: A LITTLE
DRESSING REGULAR LOWER BODY CLOTHING: A LITTLE
MOBILITY SCORE: 22
WALKING IN HOSPITAL ROOM: A LITTLE
DAILY ACTIVITIY SCORE: 20
CLIMB 3 TO 5 STEPS WITH RAILING: A LITTLE
CLIMB 3 TO 5 STEPS WITH RAILING: A LITTLE

## 2024-02-08 ASSESSMENT — PAIN SCALES - GENERAL
PAINLEVEL_OUTOF10: 6
PAINLEVEL_OUTOF10: 4
PAINLEVEL_OUTOF10: 10 - WORST POSSIBLE PAIN
PAINLEVEL_OUTOF10: 4
PAINLEVEL_OUTOF10: 10 - WORST POSSIBLE PAIN
PAINLEVEL_OUTOF10: 5 - MODERATE PAIN
PAINLEVEL_OUTOF10: 2
PAINLEVEL_OUTOF10: 4
PAINLEVEL_OUTOF10: 4
PAINLEVEL_OUTOF10: 6
PAINLEVEL_OUTOF10: 0 - NO PAIN
PAINLEVEL_OUTOF10: 7
PAINLEVEL_OUTOF10: 4

## 2024-02-08 ASSESSMENT — PAIN - FUNCTIONAL ASSESSMENT
PAIN_FUNCTIONAL_ASSESSMENT: 0-10

## 2024-02-08 ASSESSMENT — PAIN DESCRIPTION - DESCRIPTORS
DESCRIPTORS: TENDER

## 2024-02-08 ASSESSMENT — PAIN DESCRIPTION - LOCATION: LOCATION: ABDOMEN

## 2024-02-08 NOTE — PROGRESS NOTES
"Jose Carranza is a 58 y.o. female on day 2 of admission presenting with Rectal adenocarcinoma (CMS/HCC).    Subjective   Patient is awake in bed this morning. She reports having some pain with moving, but it isn't preventing her from walking. Reports no pain at rest. She is tolerating diet. Denies nausea or vomiting       Objective     Physical Exam  Constitutional:       Appearance: Normal appearance.   Cardiovascular:      Rate and Rhythm: Normal rate and regular rhythm.   Pulmonary:      Effort: Pulmonary effort is normal.      Comments: Non labored breathing on RA  Abdominal:      General: There is distension.      Palpations: Abdomen is soft.      Tenderness: There is no abdominal tenderness.      Comments: No oozing or erythema noted around the HOLA drain site 410cc- SS. Stoma is beefy red, moist, producing Brown/green liquid stool, and gas, well pouched     Genitourinary:     Comments: Alexander removed this morning, yet to void as of 9am when I saw the patient.  Skin:     General: Skin is warm and dry.   Neurological:      General: No focal deficit present.      Mental Status: She is alert and oriented to person, place, and time. Mental status is at baseline.   Psychiatric:         Attention and Perception: Attention normal.         Mood and Affect: Mood normal.         Speech: Speech normal.         Behavior: Behavior normal.         Cognition and Memory: Cognition normal.         Last Recorded Vitals  Blood pressure 133/65, pulse 73, temperature 36.6 °C (97.9 °F), temperature source Oral, resp. rate 16, height 1.651 m (5' 5\"), weight 93.8 kg (206 lb 12.7 oz), SpO2 98 %.  Intake/Output last 3 Shifts:  I/O last 3 completed shifts:  In: 718.7 (7.7 mL/kg) [I.V.:718.7 (7.7 mL/kg)]  Out: 2390 (25.5 mL/kg) [Urine:1575 (0.5 mL/kg/hr); Drains:480; Stool:335]  Weight: 93.8 kg     Relevant Results           Assessment/Plan   Principal Problem:    Rectal adenocarcinoma (CMS/HCC)  Active Problems:    Rectal cancer " (CMS/HCC)    Neuro: Acute Post-op pain, well controlled  - OOB ambulate as tolerated at least 5 times per day  - Continue current pain control regimen  - OOB more today     Cardiovascular: HR: 73    BP: 133/65     Hx: HLD  - VS Q4h    Pulmonary: Non labored breathing on RA  - Encourage IS  - Encourage ambulation    Gastrointestinal: POD 2 Robot Assisted Laparoscopic Sigmoid Colon and Rectum Resection, Right Oophrectomy Findings: No metastatic disease, No reach issues, no leak to air leak testing, both sides of the anastomosis appear healthy and pink without bleeding.        Hx: Barretts esophagus, rectal cancer   - Soft diet  - Ensure shakes  - HOLA drain care  - Ostomy nurse following  - PRN antiemetic  - continue current bowel regimen     Genitourinary: Alexander removed this morning, no void yet  - Bladder scan q6 if no void.  - Monitor I&Os   - Cr: 0.87  - Continue to trend electrolytes with daily labs    Hematology:   - H/H: 10.9/34.8 -> 11.2/34.9  - No active s/s of bleeding  - DVT Prophylaxis: SCDs/ Lovenox    Infectious Disease:   - WBC: 14.1 -> 11.7  - Afebrile, VSS  - Continue to monitor for s/s of infection  - Continue to monitor WBC with daily CBC     Dispo: Patient is doing well postoperatively. Continue to watch diet tolerance and ileostomy outputs. Discussed plan with Dr. Presley.       I spent 35 minutes in the professional and overall care of this patient.      Moses Brooke PA-C    Pt has not been out of bed yet today  Alexander out - has not urinated yet, nothing out TN, no nausea    Gen - well appearing, pink cheeks, NAD  Abd - soft minimally distended, appr tender, stoma pink with thick green output, drain with SSD  Extr - no edema SCD's on    Impression - POD # 2 s/p LAR with DLI  -doing well    Plan -   Bladder scan and PVRs  Continue diet  OOB/IS  Lovenox (home) and SCD's  Stoma ashvin out tomorrow

## 2024-02-08 NOTE — CARE PLAN
Problem: Fall/Injury  Goal: Be free from injury by end of the shift  Outcome: Progressing     Problem: Skin  Goal: Promote/optimize nutrition  2/7/2024 2210 by Sunitha Callaway RN  Outcome: Progressing  2/7/2024 2210 by Sunitha Callaway RN  Flowsheets (Taken 2/7/2024 2210)  Promote/optimize nutrition: Monitor/record intake including meals     Problem: Pain  Goal: Walks with improved pain control throughout the shift  Outcome: Progressing

## 2024-02-08 NOTE — CARE PLAN
The patient's goals for the shift include      The clinical goals for the shift include no injury during shift    Over the shift, the patient did not make progress toward the following goals. Barriers to progression include   Problem: Fall/Injury  Goal: Be free from injury by end of the shift  Outcome: Progressing     Problem: Skin  Goal: Promote/optimize nutrition  Outcome: Progressing     Problem: Pain  Goal: Walks with improved pain control throughout the shift  Outcome: Progressing   . Recommendations to address these barriers include .

## 2024-02-09 ENCOUNTER — APPOINTMENT (OUTPATIENT)
Dept: RADIOLOGY | Facility: HOSPITAL | Age: 59
DRG: 330 | End: 2024-02-09
Payer: MEDICARE

## 2024-02-09 LAB
ALBUMIN SERPL BCP-MCNC: 4.1 G/DL (ref 3.4–5)
ALP SERPL-CCNC: 94 U/L (ref 33–110)
ALT SERPL W P-5'-P-CCNC: 11 U/L (ref 7–45)
ANION GAP SERPL CALC-SCNC: 16 MMOL/L (ref 10–20)
AST SERPL W P-5'-P-CCNC: 13 U/L (ref 9–39)
ATRIAL RATE: 78 BPM
BILIRUB SERPL-MCNC: 0.6 MG/DL (ref 0–1.2)
BUN SERPL-MCNC: 20 MG/DL (ref 6–23)
CALCIUM SERPL-MCNC: 10.2 MG/DL (ref 8.6–10.3)
CHLORIDE SERPL-SCNC: 93 MMOL/L (ref 98–107)
CO2 SERPL-SCNC: 23 MMOL/L (ref 21–32)
CREAT SERPL-MCNC: 1.16 MG/DL (ref 0.5–1.05)
EGFRCR SERPLBLD CKD-EPI 2021: 54 ML/MIN/1.73M*2
ERYTHROCYTE [DISTWIDTH] IN BLOOD BY AUTOMATED COUNT: 14.2 % (ref 11.5–14.5)
GLUCOSE SERPL-MCNC: 120 MG/DL (ref 74–99)
HCT VFR BLD AUTO: 40.9 % (ref 36–46)
HGB BLD-MCNC: 14 G/DL (ref 12–16)
MCH RBC QN AUTO: 30.8 PG (ref 26–34)
MCHC RBC AUTO-ENTMCNC: 34.2 G/DL (ref 32–36)
MCV RBC AUTO: 90 FL (ref 80–100)
NRBC BLD-RTO: 0 /100 WBCS (ref 0–0)
P AXIS: 49 DEGREES
P OFFSET: 207 MS
P ONSET: 146 MS
PLATELET # BLD AUTO: 476 X10*3/UL (ref 150–450)
POTASSIUM SERPL-SCNC: 4 MMOL/L (ref 3.5–5.3)
PR INTERVAL: 162 MS
PROT SERPL-MCNC: 7.5 G/DL (ref 6.4–8.2)
Q ONSET: 227 MS
QRS COUNT: 13 BEATS
QRS DURATION: 88 MS
QT INTERVAL: 378 MS
QTC CALCULATION(BAZETT): 430 MS
QTC FREDERICIA: 412 MS
R AXIS: -35 DEGREES
RBC # BLD AUTO: 4.55 X10*6/UL (ref 4–5.2)
SODIUM SERPL-SCNC: 128 MMOL/L (ref 136–145)
T AXIS: 51 DEGREES
T OFFSET: 416 MS
VENTRICULAR RATE: 78 BPM
WBC # BLD AUTO: 12.3 X10*3/UL (ref 4.4–11.3)

## 2024-02-09 PROCEDURE — 85027 COMPLETE CBC AUTOMATED: CPT

## 2024-02-09 PROCEDURE — 70450 CT HEAD/BRAIN W/O DYE: CPT

## 2024-02-09 PROCEDURE — 84075 ASSAY ALKALINE PHOSPHATASE: CPT

## 2024-02-09 PROCEDURE — 2500000005 HC RX 250 GENERAL PHARMACY W/O HCPCS: Performed by: COLON & RECTAL SURGERY

## 2024-02-09 PROCEDURE — 2500000001 HC RX 250 WO HCPCS SELF ADMINISTERED DRUGS (ALT 637 FOR MEDICARE OP)

## 2024-02-09 PROCEDURE — 99233 SBSQ HOSP IP/OBS HIGH 50: CPT

## 2024-02-09 PROCEDURE — 2500000004 HC RX 250 GENERAL PHARMACY W/ HCPCS (ALT 636 FOR OP/ED)

## 2024-02-09 PROCEDURE — 70450 CT HEAD/BRAIN W/O DYE: CPT | Performed by: RADIOLOGY

## 2024-02-09 PROCEDURE — 2500000005 HC RX 250 GENERAL PHARMACY W/O HCPCS

## 2024-02-09 PROCEDURE — 2500000002 HC RX 250 W HCPCS SELF ADMINISTERED DRUGS (ALT 637 FOR MEDICARE OP, ALT 636 FOR OP/ED): Performed by: COLON & RECTAL SURGERY

## 2024-02-09 PROCEDURE — 36415 COLL VENOUS BLD VENIPUNCTURE: CPT

## 2024-02-09 PROCEDURE — 2500000001 HC RX 250 WO HCPCS SELF ADMINISTERED DRUGS (ALT 637 FOR MEDICARE OP): Performed by: COLON & RECTAL SURGERY

## 2024-02-09 PROCEDURE — 1100000001 HC PRIVATE ROOM DAILY

## 2024-02-09 PROCEDURE — 2500000004 HC RX 250 GENERAL PHARMACY W/ HCPCS (ALT 636 FOR OP/ED): Performed by: COLON & RECTAL SURGERY

## 2024-02-09 RX ORDER — PANTOPRAZOLE SODIUM 40 MG/10ML
40 INJECTION, POWDER, LYOPHILIZED, FOR SOLUTION INTRAVENOUS
Status: DISCONTINUED | OUTPATIENT
Start: 2024-02-10 | End: 2024-02-13 | Stop reason: HOSPADM

## 2024-02-09 RX ORDER — SCOLOPAMINE TRANSDERMAL SYSTEM 1 MG/1
1 PATCH, EXTENDED RELEASE TRANSDERMAL
Status: DISCONTINUED | OUTPATIENT
Start: 2024-02-09 | End: 2024-02-13 | Stop reason: HOSPADM

## 2024-02-09 RX ORDER — SODIUM CHLORIDE 9 MG/ML
50 INJECTION, SOLUTION INTRAVENOUS CONTINUOUS
Status: DISCONTINUED | OUTPATIENT
Start: 2024-02-09 | End: 2024-02-10

## 2024-02-09 RX ADMIN — GABAPENTIN 300 MG: 300 CAPSULE ORAL at 20:16

## 2024-02-09 RX ADMIN — LIDOCAINE 1 PATCH: 4 PATCH TOPICAL at 10:22

## 2024-02-09 RX ADMIN — PANTOPRAZOLE SODIUM 40 MG: 40 TABLET, DELAYED RELEASE ORAL at 10:22

## 2024-02-09 RX ADMIN — LEVOTHYROXINE SODIUM 88 MCG: 88 TABLET ORAL at 06:34

## 2024-02-09 RX ADMIN — KETOROLAC TROMETHAMINE 15 MG: 30 INJECTION, SOLUTION INTRAMUSCULAR; INTRAVENOUS at 01:15

## 2024-02-09 RX ADMIN — SODIUM CHLORIDE 50 ML/HR: 9 INJECTION, SOLUTION INTRAVENOUS at 13:17

## 2024-02-09 RX ADMIN — SCOLOPAMINE TRANSDERMAL SYSTEM 1 PATCH: 1 PATCH, EXTENDED RELEASE TRANSDERMAL at 12:18

## 2024-02-09 RX ADMIN — ATORVASTATIN CALCIUM 40 MG: 40 TABLET, FILM COATED ORAL at 09:00

## 2024-02-09 RX ADMIN — ENOXAPARIN SODIUM 40 MG: 40 INJECTION SUBCUTANEOUS at 15:47

## 2024-02-09 RX ADMIN — SODIUM CHLORIDE 1000 ML: 9 INJECTION, SOLUTION INTRAVENOUS at 11:50

## 2024-02-09 RX ADMIN — CALCIUM CARBONATE (ANTACID) CHEW TAB 500 MG 1000 MG: 500 CHEW TAB at 02:21

## 2024-02-09 RX ADMIN — METHOCARBAMOL 1000 MG: 1000 INJECTION, SOLUTION INTRAMUSCULAR; INTRAVENOUS at 09:56

## 2024-02-09 RX ADMIN — METHOCARBAMOL 1000 MG: 1000 INJECTION, SOLUTION INTRAMUSCULAR; INTRAVENOUS at 02:20

## 2024-02-09 RX ADMIN — AMITRIPTYLINE HYDROCHLORIDE 25 MG: 25 TABLET, FILM COATED ORAL at 20:16

## 2024-02-09 RX ADMIN — OXYCODONE HYDROCHLORIDE 5 MG: 5 TABLET ORAL at 15:47

## 2024-02-09 RX ADMIN — ONDANSETRON 4 MG: 2 INJECTION INTRAMUSCULAR; INTRAVENOUS at 05:38

## 2024-02-09 RX ADMIN — Medication: at 08:00

## 2024-02-09 RX ADMIN — ACETAMINOPHEN 650 MG: 325 TABLET ORAL at 20:16

## 2024-02-09 RX ADMIN — ACETAMINOPHEN 650 MG: 325 TABLET ORAL at 03:26

## 2024-02-09 RX ADMIN — METHOCARBAMOL 1000 MG: 1000 INJECTION, SOLUTION INTRAMUSCULAR; INTRAVENOUS at 18:03

## 2024-02-09 RX ADMIN — SODIUM CHLORIDE 500 ML: 9 INJECTION, SOLUTION INTRAVENOUS at 18:05

## 2024-02-09 ASSESSMENT — PAIN SCALES - GENERAL
PAINLEVEL_OUTOF10: 6
PAINLEVEL_OUTOF10: 2
PAINLEVEL_OUTOF10: 0 - NO PAIN
PAINLEVEL_OUTOF10: 2
PAINLEVEL_OUTOF10: 0 - NO PAIN
PAINLEVEL_OUTOF10: 3

## 2024-02-09 ASSESSMENT — COGNITIVE AND FUNCTIONAL STATUS - GENERAL
HELP NEEDED FOR BATHING: A LITTLE
EATING MEALS: A LITTLE
TURNING FROM BACK TO SIDE WHILE IN FLAT BAD: A LITTLE
PERSONAL GROOMING: A LITTLE
CLIMB 3 TO 5 STEPS WITH RAILING: A LITTLE
DAILY ACTIVITIY SCORE: 18
WALKING IN HOSPITAL ROOM: A LITTLE
MOVING TO AND FROM BED TO CHAIR: A LITTLE
TOILETING: A LITTLE
MOBILITY SCORE: 18
DRESSING REGULAR LOWER BODY CLOTHING: A LITTLE
MOVING FROM LYING ON BACK TO SITTING ON SIDE OF FLAT BED WITH BEDRAILS: A LITTLE
STANDING UP FROM CHAIR USING ARMS: A LITTLE
DRESSING REGULAR UPPER BODY CLOTHING: A LITTLE

## 2024-02-09 ASSESSMENT — PAIN - FUNCTIONAL ASSESSMENT
PAIN_FUNCTIONAL_ASSESSMENT: 0-10

## 2024-02-09 ASSESSMENT — PAIN DESCRIPTION - DESCRIPTORS: DESCRIPTORS: TENDER

## 2024-02-09 NOTE — CONSULTS
"  Wound Care Consult     Visit Date: 2/9/2024      Patient Name: Tracey Carranza         MRN: [id number]           YOB: 1965     WO nursing visit outcome: ostomy care and pouching education provided. Patient's sister and niece at bedside        [facility] next scheduled visit/plan: will follow up Monday if still inpatient      Stoma Type: Loop Ileostomy  Rickey:  removed   Diameter: 1 1/8\"  Location: RUQ  Protrusion: Flush  Mucosal Condition and Color: Moist, Red  Mucocutaneous Junction: Intact  Peristomal Skin:  slight pink in areas  Location of Skin Impairment: scattered  Peristomal Contour:  crease @ 3 & 9 o'clock   Supportive Tissue: Soft  Character of Output:  dark green liquid effluent   Emptying Frequency: TBD  Removed/Current Pouching System: removed 2 1/4 ketty new image soft convex and lock and roll pouch  Current Wearing Time: 2-3 days      Recommendations:   Skin Care: stoma powder to denuded skin as needed with pouch change  Pouching System: 2 1/4\" Bovey New Image soft convex, stoma paste at 3 & 9 o'clock, lock n' roll pouch, wafer extender at medial and lateral edge  Wear Time: ultimately 3-4 Days     Drain: Vaibhav, LLQ, and Serosanguineous     Supplier:  TBD     Comments: additional supplies left at bedside.      Time Increment: 60 minutes      Ifrah Parker RN BSN,WCC,CWOCN  [phone]   2/9/2024  5:28 PM        "

## 2024-02-09 NOTE — CARE PLAN
The patient's goals for the shift include      The clinical goals for the shift include Patient to remain comfortable during shift      Problem: Skin  Goal: Promote/optimize nutrition  Outcome: Progressing     Problem: Pain  Goal: Walks with improved pain control throughout the shift  Outcome: Progressing     Problem: Fall/Injury  Goal: Be free from injury by end of the shift  Outcome: Progressing

## 2024-02-09 NOTE — PROGRESS NOTES
"Jose Carranza is a 58 y.o. female on day 3 of admission presenting with Rectal adenocarcinoma (CMS/HCC).    Subjective   Patient is awake in bed this morning. She has vomited a few times. Denies any feeling of bloating or increased pain.    Objective     Physical Exam  Constitutional:       Appearance: Normal appearance.   Cardiovascular:      Rate and Rhythm: Normal rate and regular rhythm.   Pulmonary:      Effort: Pulmonary effort is normal.      Comments: Non labored breathing on RA  Abdominal:      General: There is distension.      Palpations: Abdomen is soft.      Tenderness: There is no abdominal tenderness.      Comments: No oozing or erythema noted around the HOLA drain site 410cc- SS. Stoma is beefy red, moist, producing 1875 ml Brown liquid stool, and gas, well pouched     Genitourinary:     Comments: Voiding independently  Skin:     General: Skin is warm and dry.   Neurological:      General: No focal deficit present.      Mental Status: She is alert and oriented to person, place, and time. Mental status is at baseline.   Psychiatric:         Attention and Perception: Attention normal.         Mood and Affect: Mood normal.         Speech: Speech normal.         Behavior: Behavior normal.         Cognition and Memory: Cognition normal.         Last Recorded Vitals  Blood pressure 110/76, pulse 83, temperature 36.9 °C (98.4 °F), temperature source Temporal, resp. rate 16, height 1.651 m (5' 5\"), weight 93.8 kg (206 lb 12.7 oz), SpO2 97 %.  Intake/Output last 3 Shifts:  I/O last 3 completed shifts:  In: 240 (2.6 mL/kg) [P.O.:240]  Out: 3835 (40.9 mL/kg) [Urine:1100 (0.3 mL/kg/hr); Drains:620; Stool:2115]  Weight: 93.8 kg     Relevant Results           Assessment/Plan   Principal Problem:    Rectal adenocarcinoma (CMS/HCC)  Active Problems:    Rectal cancer (CMS/HCC)    Neuro: Acute Post-op pain, well controlled  - OOB ambulate as tolerated at least 5 times per day  - Continue current pain control " regimen  - OOB more today     Cardiovascular: HR: 83    BP: 110/76     Hx: HLD  - VS Q4h    Pulmonary: Non labored breathing on RA  - Encourage IS  - Encourage ambulation    Gastrointestinal: POD 3 Robot Assisted Laparoscopic Sigmoid Colon and Rectum Resection, Right Oophrectomy Findings: No metastatic disease, No reach issues, no leak to air leak testing, both sides of the anastomosis appear healthy and pink without bleeding.        Hx: Barretts esophagus, rectal cancer   - Soft diet  - Ensure shakes  - HOLA drain care  - Ostomy nurse following  - Rickey removed today  - PRN antiemetic  - continue current bowel regimen     Genitourinary: Voiding independently, with some incontinence  - BS at 1500 was 390 patient able to void 200,   - Continue PVR's   - 1L fluid bolus  - restart IVF  - Monitor I&Os   - Cr: 0.87 -> 1.16  - Continue to trend electrolytes with daily labs    Hematology:   - H/H: 11.2/34.9 -> 14.0/ 40.9  - No active s/s of bleeding  - DVT Prophylaxis: SCDs/ Lovenox    Infectious Disease:   - WBC: 11.7 -> 12.3  - Afebrile, VSS  - Continue to monitor for s/s of infection  - Continue to monitor WBC with daily CBC     Dispo: Patient is Having some vomiting post op. Will have stoma nurse remove rickey to facilitate more flow, will hold off on NGT for now and allow body more time to open up. Discussed plan with Dr. Presley.       I spent 35 minutes in the professional and overall care of this patient.      Moses Brooke PA-C

## 2024-02-09 NOTE — CARE PLAN
The patient's goals for the shift include      The clinical goals for the shift include Patient to remain comfortable during shift    Over the shift, the patient did not make progress toward the following goals. Barriers to progression include   Problem: Fall/Injury  Goal: Be free from injury by end of the shift  Outcome: Progressing     Problem: Skin  Goal: Promote/optimize nutrition  Outcome: Progressing     Problem: Pain  Goal: Walks with improved pain control throughout the shift  Outcome: Progressing   . Recommendations to address these barriers include .

## 2024-02-09 NOTE — PROGRESS NOTES
Patient is day 3 of admission for rectal adenocarcinoma and new ileostomy.  Ostomy teaching this afternoon with her niece.  ADOD: 1-2 days  BARRIER: ileostomy teaching, ileostomy output, and diet tolerance  DISP: home with [facility]  Guerline Lal RN     Transitional Care Coordinator Note @ 1321  Patient having some emesis.  Stoma ashvin removed.  Possible discharge Sunday or Monday.  Guerline Lal RN

## 2024-02-10 ENCOUNTER — APPOINTMENT (OUTPATIENT)
Dept: RADIOLOGY | Facility: HOSPITAL | Age: 59
DRG: 330 | End: 2024-02-10
Payer: MEDICARE

## 2024-02-10 LAB
ALBUMIN SERPL BCP-MCNC: 3.9 G/DL (ref 3.4–5)
ALP SERPL-CCNC: 83 U/L (ref 33–110)
ALT SERPL W P-5'-P-CCNC: 12 U/L (ref 7–45)
ANION GAP SERPL CALC-SCNC: 15 MMOL/L (ref 10–20)
AST SERPL W P-5'-P-CCNC: 15 U/L (ref 9–39)
BILIRUB SERPL-MCNC: 0.5 MG/DL (ref 0–1.2)
BUN SERPL-MCNC: 23 MG/DL (ref 6–23)
CALCIUM SERPL-MCNC: 9.2 MG/DL (ref 8.6–10.3)
CHLORIDE SERPL-SCNC: 98 MMOL/L (ref 98–107)
CO2 SERPL-SCNC: 21 MMOL/L (ref 21–32)
CREAT SERPL-MCNC: 1.02 MG/DL (ref 0.5–1.05)
EGFRCR SERPLBLD CKD-EPI 2021: 64 ML/MIN/1.73M*2
ERYTHROCYTE [DISTWIDTH] IN BLOOD BY AUTOMATED COUNT: 14.2 % (ref 11.5–14.5)
GLUCOSE SERPL-MCNC: 125 MG/DL (ref 74–99)
HCT VFR BLD AUTO: 40.2 % (ref 36–46)
HGB BLD-MCNC: 13.5 G/DL (ref 12–16)
MCH RBC QN AUTO: 31.1 PG (ref 26–34)
MCHC RBC AUTO-ENTMCNC: 33.6 G/DL (ref 32–36)
MCV RBC AUTO: 93 FL (ref 80–100)
NRBC BLD-RTO: 0 /100 WBCS (ref 0–0)
PLATELET # BLD AUTO: 464 X10*3/UL (ref 150–450)
POTASSIUM SERPL-SCNC: 3.8 MMOL/L (ref 3.5–5.3)
PROT SERPL-MCNC: 7.2 G/DL (ref 6.4–8.2)
RBC # BLD AUTO: 4.34 X10*6/UL (ref 4–5.2)
SODIUM SERPL-SCNC: 130 MMOL/L (ref 136–145)
WBC # BLD AUTO: 11 X10*3/UL (ref 4.4–11.3)

## 2024-02-10 PROCEDURE — C9113 INJ PANTOPRAZOLE SODIUM, VIA: HCPCS

## 2024-02-10 PROCEDURE — 2500000002 HC RX 250 W HCPCS SELF ADMINISTERED DRUGS (ALT 637 FOR MEDICARE OP, ALT 636 FOR OP/ED)

## 2024-02-10 PROCEDURE — 2500000004 HC RX 250 GENERAL PHARMACY W/ HCPCS (ALT 636 FOR OP/ED)

## 2024-02-10 PROCEDURE — 74022 RADEX COMPL AQT ABD SERIES: CPT | Performed by: RADIOLOGY

## 2024-02-10 PROCEDURE — 2500000005 HC RX 250 GENERAL PHARMACY W/O HCPCS

## 2024-02-10 PROCEDURE — 80053 COMPREHEN METABOLIC PANEL: CPT

## 2024-02-10 PROCEDURE — 2500000001 HC RX 250 WO HCPCS SELF ADMINISTERED DRUGS (ALT 637 FOR MEDICARE OP)

## 2024-02-10 PROCEDURE — 2500000004 HC RX 250 GENERAL PHARMACY W/ HCPCS (ALT 636 FOR OP/ED): Performed by: COLON & RECTAL SURGERY

## 2024-02-10 PROCEDURE — 2500000001 HC RX 250 WO HCPCS SELF ADMINISTERED DRUGS (ALT 637 FOR MEDICARE OP): Performed by: COLON & RECTAL SURGERY

## 2024-02-10 PROCEDURE — 1100000001 HC PRIVATE ROOM DAILY

## 2024-02-10 PROCEDURE — 85027 COMPLETE CBC AUTOMATED: CPT

## 2024-02-10 PROCEDURE — 2500000004 HC RX 250 GENERAL PHARMACY W/ HCPCS (ALT 636 FOR OP/ED): Performed by: REGISTERED NURSE

## 2024-02-10 PROCEDURE — 36415 COLL VENOUS BLD VENIPUNCTURE: CPT

## 2024-02-10 PROCEDURE — 74018 RADEX ABDOMEN 1 VIEW: CPT

## 2024-02-10 PROCEDURE — 99232 SBSQ HOSP IP/OBS MODERATE 35: CPT

## 2024-02-10 PROCEDURE — 2500000005 HC RX 250 GENERAL PHARMACY W/O HCPCS: Performed by: COLON & RECTAL SURGERY

## 2024-02-10 RX ORDER — ONDANSETRON HYDROCHLORIDE 2 MG/ML
4 INJECTION, SOLUTION INTRAVENOUS EVERY 8 HOURS PRN
Status: DISCONTINUED | OUTPATIENT
Start: 2024-02-10 | End: 2024-02-10

## 2024-02-10 RX ORDER — SODIUM CHLORIDE 9 MG/ML
50 INJECTION, SOLUTION INTRAVENOUS CONTINUOUS
Status: CANCELLED | OUTPATIENT
Start: 2024-02-10

## 2024-02-10 RX ORDER — AMITRIPTYLINE HYDROCHLORIDE 25 MG/1
25 TABLET, FILM COATED ORAL NIGHTLY
Status: CANCELLED | OUTPATIENT
Start: 2024-02-10

## 2024-02-10 RX ORDER — LIDOCAINE HYDROCHLORIDE 20 MG/ML
1 JELLY TOPICAL ONCE
Status: COMPLETED | OUTPATIENT
Start: 2024-02-10 | End: 2024-02-10

## 2024-02-10 RX ORDER — ONDANSETRON 4 MG/1
4 TABLET, ORALLY DISINTEGRATING ORAL EVERY 6 HOURS PRN
Status: DISCONTINUED | OUTPATIENT
Start: 2024-02-10 | End: 2024-02-13 | Stop reason: HOSPADM

## 2024-02-10 RX ORDER — ONDANSETRON HYDROCHLORIDE 2 MG/ML
4 INJECTION, SOLUTION INTRAVENOUS EVERY 6 HOURS PRN
Status: DISCONTINUED | OUTPATIENT
Start: 2024-02-10 | End: 2024-02-13 | Stop reason: HOSPADM

## 2024-02-10 RX ORDER — DEXTROSE MONOHYDRATE, SODIUM CHLORIDE, AND POTASSIUM CHLORIDE 50; 1.49; 4.5 G/1000ML; G/1000ML; G/1000ML
100 INJECTION, SOLUTION INTRAVENOUS CONTINUOUS
Status: CANCELLED | OUTPATIENT
Start: 2024-02-10

## 2024-02-10 RX ORDER — LIDOCAINE HYDROCHLORIDE 20 MG/ML
1 JELLY TOPICAL ONCE
Status: CANCELLED | OUTPATIENT
Start: 2024-02-10 | End: 2024-02-10

## 2024-02-10 RX ORDER — ATORVASTATIN CALCIUM 40 MG/1
40 TABLET, FILM COATED ORAL DAILY
Status: CANCELLED | OUTPATIENT
Start: 2024-02-11

## 2024-02-10 RX ORDER — TAMSULOSIN HYDROCHLORIDE 0.4 MG/1
0.4 CAPSULE ORAL DAILY
Status: DISCONTINUED | OUTPATIENT
Start: 2024-02-10 | End: 2024-02-13 | Stop reason: HOSPADM

## 2024-02-10 RX ORDER — DEXTROSE MONOHYDRATE, SODIUM CHLORIDE, AND POTASSIUM CHLORIDE 50; 1.49; 4.5 G/1000ML; G/1000ML; G/1000ML
100 INJECTION, SOLUTION INTRAVENOUS CONTINUOUS
Status: DISCONTINUED | OUTPATIENT
Start: 2024-02-10 | End: 2024-02-13 | Stop reason: HOSPADM

## 2024-02-10 RX ORDER — ONDANSETRON 4 MG/1
4 TABLET, ORALLY DISINTEGRATING ORAL EVERY 6 HOURS PRN
Status: DISCONTINUED | OUTPATIENT
Start: 2024-02-10 | End: 2024-02-10

## 2024-02-10 RX ADMIN — TAMSULOSIN HYDROCHLORIDE 0.4 MG: 0.4 CAPSULE ORAL at 11:55

## 2024-02-10 RX ADMIN — ATORVASTATIN CALCIUM 40 MG: 40 TABLET, FILM COATED ORAL at 08:47

## 2024-02-10 RX ADMIN — ONDANSETRON 4 MG: 2 INJECTION INTRAMUSCULAR; INTRAVENOUS at 16:58

## 2024-02-10 RX ADMIN — LIDOCAINE 1 PATCH: 4 PATCH TOPICAL at 08:47

## 2024-02-10 RX ADMIN — SODIUM CHLORIDE 1000 ML: 9 INJECTION, SOLUTION INTRAVENOUS at 11:47

## 2024-02-10 RX ADMIN — POTASSIUM CHLORIDE, DEXTROSE MONOHYDRATE AND SODIUM CHLORIDE 50 ML/HR: 150; 5; 450 INJECTION, SOLUTION INTRAVENOUS at 18:07

## 2024-02-10 RX ADMIN — LIDOCAINE HYDROCHLORIDE 1 APPLICATION: 20 JELLY TOPICAL at 16:45

## 2024-02-10 RX ADMIN — ONDANSETRON 4 MG: 2 INJECTION INTRAMUSCULAR; INTRAVENOUS at 11:50

## 2024-02-10 RX ADMIN — GABAPENTIN 300 MG: 300 CAPSULE ORAL at 08:47

## 2024-02-10 RX ADMIN — Medication 1 SPRAY: at 18:08

## 2024-02-10 RX ADMIN — METHOCARBAMOL 1000 MG: 1000 INJECTION, SOLUTION INTRAMUSCULAR; INTRAVENOUS at 18:08

## 2024-02-10 RX ADMIN — PANTOPRAZOLE SODIUM 40 MG: 40 INJECTION, POWDER, FOR SOLUTION INTRAVENOUS at 06:01

## 2024-02-10 RX ADMIN — ENOXAPARIN SODIUM 40 MG: 40 INJECTION SUBCUTANEOUS at 15:13

## 2024-02-10 RX ADMIN — HYDROMORPHONE HYDROCHLORIDE 0.2 MG: 0.2 INJECTION, SOLUTION INTRAMUSCULAR; INTRAVENOUS; SUBCUTANEOUS at 21:06

## 2024-02-10 RX ADMIN — ACETAMINOPHEN 650 MG: 325 TABLET ORAL at 15:13

## 2024-02-10 RX ADMIN — AMITRIPTYLINE HYDROCHLORIDE 25 MG: 25 TABLET, FILM COATED ORAL at 21:05

## 2024-02-10 RX ADMIN — ONDANSETRON 4 MG: 2 INJECTION INTRAMUSCULAR; INTRAVENOUS at 02:32

## 2024-02-10 RX ADMIN — ACETAMINOPHEN 650 MG: 325 TABLET ORAL at 08:45

## 2024-02-10 RX ADMIN — METHOCARBAMOL 1000 MG: 1000 INJECTION, SOLUTION INTRAMUSCULAR; INTRAVENOUS at 11:47

## 2024-02-10 RX ADMIN — METHOCARBAMOL 1000 MG: 1000 INJECTION, SOLUTION INTRAMUSCULAR; INTRAVENOUS at 02:29

## 2024-02-10 RX ADMIN — GABAPENTIN 300 MG: 300 CAPSULE ORAL at 15:13

## 2024-02-10 ASSESSMENT — PAIN - FUNCTIONAL ASSESSMENT
PAIN_FUNCTIONAL_ASSESSMENT: 0-10

## 2024-02-10 ASSESSMENT — PAIN SCALES - GENERAL
PAINLEVEL_OUTOF10: 0 - NO PAIN
PAINLEVEL_OUTOF10: 0 - NO PAIN
PAINLEVEL_OUTOF10: 10 - WORST POSSIBLE PAIN
PAINLEVEL_OUTOF10: 10 - WORST POSSIBLE PAIN
PAINLEVEL_OUTOF10: 0 - NO PAIN

## 2024-02-10 ASSESSMENT — COGNITIVE AND FUNCTIONAL STATUS - GENERAL
DAILY ACTIVITIY SCORE: 19
CLIMB 3 TO 5 STEPS WITH RAILING: A LOT
MOBILITY SCORE: 18
DRESSING REGULAR UPPER BODY CLOTHING: A LITTLE
STANDING UP FROM CHAIR USING ARMS: A LITTLE
PERSONAL GROOMING: A LITTLE
DRESSING REGULAR LOWER BODY CLOTHING: A LITTLE
MOVING FROM LYING ON BACK TO SITTING ON SIDE OF FLAT BED WITH BEDRAILS: A LITTLE
WALKING IN HOSPITAL ROOM: A LITTLE
PERSONAL GROOMING: A LITTLE
MOVING TO AND FROM BED TO CHAIR: A LITTLE
TURNING FROM BACK TO SIDE WHILE IN FLAT BAD: A LITTLE
EATING MEALS: A LITTLE
DAILY ACTIVITIY SCORE: 18
DRESSING REGULAR LOWER BODY CLOTHING: A LITTLE
WALKING IN HOSPITAL ROOM: A LITTLE
DRESSING REGULAR UPPER BODY CLOTHING: A LITTLE
TOILETING: A LITTLE
HELP NEEDED FOR BATHING: A LITTLE
HELP NEEDED FOR BATHING: A LITTLE
STANDING UP FROM CHAIR USING ARMS: A LITTLE
TOILETING: A LITTLE
TURNING FROM BACK TO SIDE WHILE IN FLAT BAD: A LITTLE
MOBILITY SCORE: 17
MOVING FROM LYING ON BACK TO SITTING ON SIDE OF FLAT BED WITH BEDRAILS: A LITTLE
CLIMB 3 TO 5 STEPS WITH RAILING: A LITTLE
MOVING TO AND FROM BED TO CHAIR: A LITTLE

## 2024-02-10 ASSESSMENT — PAIN DESCRIPTION - LOCATION
LOCATION: ABDOMEN
LOCATION: ABDOMEN

## 2024-02-10 ASSESSMENT — PAIN SCALES - PAIN ASSESSMENT IN ADVANCED DEMENTIA (PAINAD)
CONSOLABILITY: NO NEED TO CONSOLE
BREATHING: NORMAL

## 2024-02-10 NOTE — CARE PLAN
Problem: Fall/Injury  Goal: Be free from injury by end of the shift  Outcome: Progressing     Problem: Skin  Goal: Promote/optimize nutrition  Outcome: Progressing     Problem: Pain  Goal: Walks with improved pain control throughout the shift  Outcome: Progressing

## 2024-02-10 NOTE — PROGRESS NOTES
"Tracey Carranza is a 58 y.o. female on day 4 of admission presenting with Rectal adenocarcinoma (CMS/HCC). Now POD#4 s/p Robot Assisted Laparoscopic Sigmoid Colon and Rectum Resection, Right Oophrectomy. Post operative findings significant for No metastatic disease, No reach issues, no leak to air leak testing, both sides of the anastomosis appear healthy and pink without bleeding.       Subjective   Patient feeling nauseated this morning, had one small episode of emesis, was not nauseated overnight. Appetite minimal, does not feel bloated. Abdominal pain is minimal. Had fall yesterday, denies any dizziness or HA.     Output over the last 24 hours:  Ostomy- mushy brown stool- 1440 ml  HOLA drain- 55ml of SS   Emesis- 100 ml   ml, PVR's obtained >300ml       Objective   PE:  Constitutional: A&Ox3, calm and cooperative  Eyes: clear sclera   ENMT: Moist mucous membranes  Head/Neck: Neck supple  Cardiovascular: Normal rate and regular rhythm.   Respiratory/Thorax: CTAB, Good symmetric chest expansion.   Gastrointestinal: Abdomen slightly distended, soft, appropriately tender, no peritoneal signs, laparotomy sites c/d/i, well approximate with Dermabond, no erythema or drainage. Ostomy with soft mushy stool, belt in place. Stoma is pink, moist, beefy, well pouched. HOLA drain with SS output- stripped at the bedside.  Genitourinary: Voiding independently   Musculoskeletal: ROM intact  Extremities: No peripheral edema  Neurological: A&Ox3, No focal deficits   Psychological: Appropriate mood and behavior  Skin: Warm and dry      Last Recorded Vitals  Blood pressure 110/79, pulse 78, temperature 36.6 °C (97.8 °F), temperature source Temporal, resp. rate 17, height 1.651 m (5' 5\"), weight 93.8 kg (206 lb 12.7 oz), SpO2 97 %.  Intake/Output last 3 Shifts:  I/O last 3 completed shifts:  In: 1745.8 (18.6 mL/kg) [P.O.:960; I.V.:785.8 (8.4 mL/kg)]  Out: 3815 (40.7 mL/kg) [Urine:780 (0.2 mL/kg/hr); Emesis/NG output:800; " Drains:145; Stool:2090]  Weight: 93.8 kg     Relevant Results  Results for orders placed or performed during the hospital encounter of 02/06/24 (from the past 24 hour(s))   CBC   Result Value Ref Range    WBC 11.0 4.4 - 11.3 x10*3/uL    nRBC 0.0 0.0 - 0.0 /100 WBCs    RBC 4.34 4.00 - 5.20 x10*6/uL    Hemoglobin 13.5 12.0 - 16.0 g/dL    Hematocrit 40.2 36.0 - 46.0 %    MCV 93 80 - 100 fL    MCH 31.1 26.0 - 34.0 pg    MCHC 33.6 32.0 - 36.0 g/dL    RDW 14.2 11.5 - 14.5 %    Platelets 464 (H) 150 - 450 x10*3/uL   Comprehensive Metabolic Panel   Result Value Ref Range    Glucose 125 (H) 74 - 99 mg/dL    Sodium 130 (L) 136 - 145 mmol/L    Potassium 3.8 3.5 - 5.3 mmol/L    Chloride 98 98 - 107 mmol/L    Bicarbonate 21 21 - 32 mmol/L    Anion Gap 15 10 - 20 mmol/L    Urea Nitrogen 23 6 - 23 mg/dL    Creatinine 1.02 0.50 - 1.05 mg/dL    eGFR 64 >60 mL/min/1.73m*2    Calcium 9.2 8.6 - 10.3 mg/dL    Albumin 3.9 3.4 - 5.0 g/dL    Alkaline Phosphatase 83 33 - 110 U/L    Total Protein 7.2 6.4 - 8.2 g/dL    AST 15 9 - 39 U/L    Bilirubin, Total 0.5 0.0 - 1.2 mg/dL    ALT 12 7 - 45 U/L     CT head wo IV contrast   Final Result   No evidence of acute cortical infarct or intracranial hemorrhage.        MACRO:   None             Signed by: Zackery Purvis 2/9/2024 8:01 PM   Dictation workstation:   [id number]                         Assessment/Plan   Principal Problem:    Rectal adenocarcinoma (CMS/HCC)  Active Problems:    Rectal cancer (CMS/HCC)    Tracey Carranza is a 58 y.o. female on day 4 of admission presenting with Rectal adenocarcinoma (CMS/HCC). Now POD#4 s/p Robot Assisted Laparoscopic Sigmoid Colon and Rectum Resection, Right Oophrectomy. Post operative findings significant for No metastatic disease, No reach issues, no leak to air leak testing, both sides of the anastomosis appear healthy and pink without bleeding.       GI: Abdomen slightly distended, soft, appropriately tender, no peritoneal signs, laparotomy sites  c/d/i, well approximate with Dermabond, no erythema or drainage. Ostomy with soft mushy stool, belt in place. Stoma is pink, moist, beefy, well pouched. HOLA drain with SS output- stripped at the bedside.  Hx: rectal adenocarcinoma, GERD  - GI sot low residual diet  -> encouraged slow PO intake, sips of liquids today  - IVF at 50ml/hr   - Ensure surgery TID x 5 days  - Nutrition Consulted   - daily PPI, PRN TUMS  - Chewing gum   - OOB/Up to chairs for meals  - Ambulate in boone 5x day  - Antiemetics as needed, scop patch in place  - Drain care Qshift  - Enterostomal nurse following  --> ashvin removed 2/9    Neuro:  Acute post op Pain expected, well controlled; OARRS reviewed  Hx: acute cervical radiculopathy, meralgia paresthetica of right side, RA   - Continue current pain control regimen  - lidocaine patch   - continue home amitriptyline   - 30 Day MME 0.00    Cardiac: VSS  Hx: HLD  - continue home statin    Respiratory: on RA, no increased WOB   - Encourage IS Q1 Hour  - Cough, deep breath    RENAL: PVR's recorded 300-400ml, creatinine 1.02, BUN 23, Na 130  Voiding adequate amounts; lytes unremarkable  - Monitor and treat as needed  - added flomax  - place stephenson catheter  - Ordered 1 liter NS bolus  - repeat BMP in the AM    HEME:  H/Hct 13.5/10.2, plts 464- stable   -BOONE/SCD/Lovenox  - repeat CBC in the AM  - will need Lovenox for home    Endo  Hx: Hypothyroidism   - continue home levothyroxine     ID: WBC 11  Afebrile   -Monitor for s/sx of infections     Dispo: Discussed with Dr. Presley, will place stephenson catheter, continue to monitor output, will consider NGT if continues to have N/V. OOB as much as possible with fall precautions.     I spent 35 minutes in the professional and overall care of this patient.      Liliana Mcbride, APRN-CNP    Addendum: Patient continuing to have nausea this afternoon, no vomiting, stephenson catheter in place, ostomy functioning, recommending NGT could help her feel better, patient  declining will re-assess later for possible NGT placement later today.    Pt seen at 4om she continues to have severe nausea and vomiting with any PO.  She has pain from the vomiting.  Stoma is working and has leaked, drain with SSD    Gen - sick appearing. Lying in bed  Abd - soft, minimally distended, except epigastrically, drain with SSD thick green stoma output   Extr - no edema    Impression - Pt s/p LAR, urinary retention, ileus, SORAIDA - better     Plan   NGT placement  Only needed PO meds, minimize narcotics  OOB as much as possible  Lovenox, SCD's  Check U/A and Culture with ileus and urinary retention  Flomax   Change IVF to have D5 and increase to 100/hr

## 2024-02-10 NOTE — CARE PLAN
Problem: Skin  Goal: Promote/optimize nutrition  2/10/2024 1234 by Dony Car RN  Outcome: Progressing  2/10/2024 1234 by Dony Car RN  Outcome: Progressing     Problem: Pain  Goal: Walks with improved pain control throughout the shift  2/10/2024 1234 by Dony Car RN  Outcome: Progressing  2/10/2024 1234 by Dony Car RN  Outcome: Progressing     Problem: Fall/Injury  Goal: Be free from injury by end of the shift  2/10/2024 1234 by Dony Car RN  Outcome: Progressing  2/10/2024 1234 by Dony Car RN  Outcome: Progressing   The patient's goals for the shift include      The clinical goals for the shift include will remain safe

## 2024-02-11 LAB
ALBUMIN SERPL BCP-MCNC: 3.5 G/DL (ref 3.4–5)
ALP SERPL-CCNC: 72 U/L (ref 33–110)
ALT SERPL W P-5'-P-CCNC: 13 U/L (ref 7–45)
ANION GAP SERPL CALC-SCNC: 14 MMOL/L (ref 10–20)
APPEARANCE UR: ABNORMAL
AST SERPL W P-5'-P-CCNC: 12 U/L (ref 9–39)
BACTERIA #/AREA URNS AUTO: ABNORMAL /HPF
BILIRUB SERPL-MCNC: 0.3 MG/DL (ref 0–1.2)
BILIRUB UR STRIP.AUTO-MCNC: ABNORMAL MG/DL
BUN SERPL-MCNC: 20 MG/DL (ref 6–23)
CALCIUM SERPL-MCNC: 8.6 MG/DL (ref 8.6–10.3)
CHLORIDE SERPL-SCNC: 100 MMOL/L (ref 98–107)
CO2 SERPL-SCNC: 21 MMOL/L (ref 21–32)
COLOR UR: YELLOW
CREAT SERPL-MCNC: 0.8 MG/DL (ref 0.5–1.05)
EGFRCR SERPLBLD CKD-EPI 2021: 85 ML/MIN/1.73M*2
ERYTHROCYTE [DISTWIDTH] IN BLOOD BY AUTOMATED COUNT: 14.5 % (ref 11.5–14.5)
GLUCOSE SERPL-MCNC: 122 MG/DL (ref 74–99)
GLUCOSE UR STRIP.AUTO-MCNC: NEGATIVE MG/DL
HCT VFR BLD AUTO: 39.1 % (ref 36–46)
HGB BLD-MCNC: 12.7 G/DL (ref 12–16)
HYALINE CASTS #/AREA URNS AUTO: ABNORMAL /LPF
KETONES UR STRIP.AUTO-MCNC: ABNORMAL MG/DL
LEUKOCYTE ESTERASE UR QL STRIP.AUTO: ABNORMAL
MCH RBC QN AUTO: 29.9 PG (ref 26–34)
MCHC RBC AUTO-ENTMCNC: 32.5 G/DL (ref 32–36)
MCV RBC AUTO: 92 FL (ref 80–100)
MUCOUS THREADS #/AREA URNS AUTO: ABNORMAL /LPF
NITRITE UR QL STRIP.AUTO: POSITIVE
NRBC BLD-RTO: 0 /100 WBCS (ref 0–0)
PH UR STRIP.AUTO: 5 [PH]
PLATELET # BLD AUTO: 454 X10*3/UL (ref 150–450)
POTASSIUM SERPL-SCNC: 3.9 MMOL/L (ref 3.5–5.3)
PROT SERPL-MCNC: 6.4 G/DL (ref 6.4–8.2)
PROT UR STRIP.AUTO-MCNC: ABNORMAL MG/DL
RBC # BLD AUTO: 4.25 X10*6/UL (ref 4–5.2)
RBC # UR STRIP.AUTO: ABNORMAL /UL
RBC #/AREA URNS AUTO: >20 /HPF
SODIUM SERPL-SCNC: 131 MMOL/L (ref 136–145)
SP GR UR STRIP.AUTO: 1.03
UROBILINOGEN UR STRIP.AUTO-MCNC: <2 MG/DL
WBC # BLD AUTO: 10.9 X10*3/UL (ref 4.4–11.3)
WBC #/AREA URNS AUTO: >50 /HPF
WBC CLUMPS #/AREA URNS AUTO: ABNORMAL /HPF

## 2024-02-11 PROCEDURE — 85027 COMPLETE CBC AUTOMATED: CPT

## 2024-02-11 PROCEDURE — 2500000002 HC RX 250 W HCPCS SELF ADMINISTERED DRUGS (ALT 637 FOR MEDICARE OP, ALT 636 FOR OP/ED)

## 2024-02-11 PROCEDURE — 1100000001 HC PRIVATE ROOM DAILY

## 2024-02-11 PROCEDURE — 2500000004 HC RX 250 GENERAL PHARMACY W/ HCPCS (ALT 636 FOR OP/ED): Performed by: COLON & RECTAL SURGERY

## 2024-02-11 PROCEDURE — 81001 URINALYSIS AUTO W/SCOPE: CPT | Performed by: REGISTERED NURSE

## 2024-02-11 PROCEDURE — 80053 COMPREHEN METABOLIC PANEL: CPT

## 2024-02-11 PROCEDURE — 36415 COLL VENOUS BLD VENIPUNCTURE: CPT

## 2024-02-11 PROCEDURE — C9113 INJ PANTOPRAZOLE SODIUM, VIA: HCPCS

## 2024-02-11 PROCEDURE — 2500000005 HC RX 250 GENERAL PHARMACY W/O HCPCS

## 2024-02-11 PROCEDURE — 2500000001 HC RX 250 WO HCPCS SELF ADMINISTERED DRUGS (ALT 637 FOR MEDICARE OP): Performed by: COLON & RECTAL SURGERY

## 2024-02-11 PROCEDURE — 2500000004 HC RX 250 GENERAL PHARMACY W/ HCPCS (ALT 636 FOR OP/ED)

## 2024-02-11 PROCEDURE — 2500000001 HC RX 250 WO HCPCS SELF ADMINISTERED DRUGS (ALT 637 FOR MEDICARE OP)

## 2024-02-11 PROCEDURE — 2500000002 HC RX 250 W HCPCS SELF ADMINISTERED DRUGS (ALT 637 FOR MEDICARE OP, ALT 636 FOR OP/ED): Performed by: COLON & RECTAL SURGERY

## 2024-02-11 PROCEDURE — 99232 SBSQ HOSP IP/OBS MODERATE 35: CPT

## 2024-02-11 PROCEDURE — 94760 N-INVAS EAR/PLS OXIMETRY 1: CPT

## 2024-02-11 RX ORDER — CIPROFLOXACIN 2 MG/ML
400 INJECTION, SOLUTION INTRAVENOUS EVERY 12 HOURS
Status: DISCONTINUED | OUTPATIENT
Start: 2024-02-11 | End: 2024-02-13

## 2024-02-11 RX ORDER — CIPROFLOXACIN 2 MG/ML
400 INJECTION, SOLUTION INTRAVENOUS EVERY 12 HOURS
Status: CANCELLED | OUTPATIENT
Start: 2024-02-11

## 2024-02-11 RX ORDER — TALC
6 POWDER (GRAM) TOPICAL NIGHTLY
Status: DISCONTINUED | OUTPATIENT
Start: 2024-02-11 | End: 2024-02-13 | Stop reason: HOSPADM

## 2024-02-11 RX ORDER — DIPHENHYDRAMINE HYDROCHLORIDE 50 MG/ML
50 INJECTION INTRAMUSCULAR; INTRAVENOUS ONCE
Status: COMPLETED | OUTPATIENT
Start: 2024-02-11 | End: 2024-02-11

## 2024-02-11 RX ADMIN — LEVOTHYROXINE SODIUM 88 MCG: 88 TABLET ORAL at 06:35

## 2024-02-11 RX ADMIN — PANTOPRAZOLE SODIUM 40 MG: 40 INJECTION, POWDER, FOR SOLUTION INTRAVENOUS at 06:35

## 2024-02-11 RX ADMIN — LIDOCAINE 1 PATCH: 4 PATCH TOPICAL at 09:12

## 2024-02-11 RX ADMIN — CIPROFLOXACIN 400 MG: 400 INJECTION, SOLUTION INTRAVENOUS at 15:22

## 2024-02-11 RX ADMIN — POTASSIUM CHLORIDE, DEXTROSE MONOHYDRATE AND SODIUM CHLORIDE 100 ML/HR: 150; 5; 450 INJECTION, SOLUTION INTRAVENOUS at 16:50

## 2024-02-11 RX ADMIN — METHOCARBAMOL 1000 MG: 1000 INJECTION, SOLUTION INTRAMUSCULAR; INTRAVENOUS at 15:45

## 2024-02-11 RX ADMIN — CIPROFLOXACIN 400 MG: 400 INJECTION, SOLUTION INTRAVENOUS at 21:48

## 2024-02-11 RX ADMIN — ENOXAPARIN SODIUM 40 MG: 40 INJECTION SUBCUTANEOUS at 15:22

## 2024-02-11 RX ADMIN — POTASSIUM CHLORIDE, DEXTROSE MONOHYDRATE AND SODIUM CHLORIDE 100 ML/HR: 150; 5; 450 INJECTION, SOLUTION INTRAVENOUS at 05:28

## 2024-02-11 RX ADMIN — DIPHENHYDRAMINE HYDROCHLORIDE 50 MG: 50 INJECTION, SOLUTION INTRAMUSCULAR; INTRAVENOUS at 21:48

## 2024-02-11 RX ADMIN — TAMSULOSIN HYDROCHLORIDE 0.4 MG: 0.4 CAPSULE ORAL at 09:12

## 2024-02-11 RX ADMIN — Medication 6 MG: at 21:35

## 2024-02-11 RX ADMIN — METHOCARBAMOL 1000 MG: 1000 INJECTION, SOLUTION INTRAMUSCULAR; INTRAVENOUS at 02:34

## 2024-02-11 RX ADMIN — ONDANSETRON 4 MG: 2 INJECTION INTRAMUSCULAR; INTRAVENOUS at 04:17

## 2024-02-11 RX ADMIN — AMITRIPTYLINE HYDROCHLORIDE 25 MG: 25 TABLET, FILM COATED ORAL at 21:35

## 2024-02-11 ASSESSMENT — COGNITIVE AND FUNCTIONAL STATUS - GENERAL
DRESSING REGULAR UPPER BODY CLOTHING: A LITTLE
MOVING TO AND FROM BED TO CHAIR: A LITTLE
MOBILITY SCORE: 19
PERSONAL GROOMING: A LITTLE
WALKING IN HOSPITAL ROOM: A LITTLE
TOILETING: A LITTLE
DRESSING REGULAR LOWER BODY CLOTHING: A LITTLE
HELP NEEDED FOR BATHING: A LITTLE
STANDING UP FROM CHAIR USING ARMS: A LITTLE
DAILY ACTIVITIY SCORE: 19
CLIMB 3 TO 5 STEPS WITH RAILING: A LOT

## 2024-02-11 ASSESSMENT — PAIN - FUNCTIONAL ASSESSMENT
PAIN_FUNCTIONAL_ASSESSMENT: 0-10
PAIN_FUNCTIONAL_ASSESSMENT: 0-10

## 2024-02-11 ASSESSMENT — PAIN SCALES - GENERAL
PAINLEVEL_OUTOF10: 0 - NO PAIN
PAINLEVEL_OUTOF10: 0 - NO PAIN

## 2024-02-11 NOTE — PROGRESS NOTES
"Tracey Carranza is a 58 y.o. female on day 5 of admission presenting with Rectal adenocarcinoma (CMS/HCC). Now POD#5 s/p Robot Assisted Laparoscopic Sigmoid Colon and Rectum Resection, Right Oophrectomy. Post operative findings significant for No metastatic disease, No reach issues, no leak to air leak testing, both sides of the anastomosis appear healthy and pink without bleeding.       Subjective   Patient feeling better this morning after NGT placement yesterday evening, nausea and abd pain is improved, main complaint now is sore throat from tube placement. Feels like bloating is better.      Output over the last 24 hours:  Ostomy- mushy brown stool- 700 ml  HOLA drain- 175ml of SS   NGT-1950 ml  UOP 1200 ml, stephenson catheter in place        Objective   PE:  Constitutional: A&Ox3, calm and cooperative  Eyes: clear sclera   ENMT: Moist mucous membranes  Head/Neck: Neck supple  Cardiovascular: Normal rate and regular rhythm.   Respiratory/Thorax: CTAB, Good symmetric chest expansion.   Gastrointestinal: Abdomen slightly distended, soft, appropriately tender, no peritoneal signs, laparotomy sites c/d/i, well approximate with Dermabond, no erythema or drainage. Ostomy with soft mushy stool, belt in place. Stoma is pink, moist, beefy, well pouched. HOLA drain with SS output- stripped at the bedside.  Genitourinary: Stephenson catheter in place   Musculoskeletal: ROM intact  Extremities: No peripheral edema  Neurological: A&Ox3, No focal deficits   Psychological: Appropriate mood and behavior  Skin: Warm and dry      Last Recorded Vitals  Blood pressure 133/78, pulse 81, temperature 36.7 °C (98.1 °F), temperature source Temporal, resp. rate 18, height 1.651 m (5' 5\"), weight 88.7 kg (195 lb 8.8 oz), SpO2 98 %.  Intake/Output last 3 Shifts:  I/O last 3 completed shifts:  In: 3675.8 (41.4 mL/kg) [P.O.:480; I.V.:1325.8 (14.9 mL/kg); IV Piggyback:1870]  Out: 4475 (50.5 mL/kg) [Urine:1450 (0.5 mL/kg/hr); Emesis/NG output:1950; " Drains:175; Stool:900]  Weight: 88.7 kg     Relevant Results  Results for orders placed or performed during the hospital encounter of 02/06/24 (from the past 24 hour(s))   Urinalysis with Reflex Microscopic   Result Value Ref Range    Color, Urine Yellow Straw, Yellow    Appearance, Urine Hazy (N) Clear    Specific Gravity, Urine 1.033 1.005 - 1.035    pH, Urine 5.0 5.0, 5.5, 6.0, 6.5, 7.0, 7.5, 8.0    Protein, Urine 100 (2+) (N) NEGATIVE mg/dL    Glucose, Urine NEGATIVE NEGATIVE mg/dL    Blood, Urine MODERATE (2+) (A) NEGATIVE    Ketones, Urine 5 (TRACE) (A) NEGATIVE mg/dL    Bilirubin, Urine SMALL (1+) (A) NEGATIVE    Urobilinogen, Urine <2.0 <2.0 mg/dL    Nitrite, Urine POSITIVE (A) NEGATIVE    Leukocyte Esterase, Urine LARGE (3+) (A) NEGATIVE   Microscopic Only, Urine   Result Value Ref Range    WBC, Urine >50 (A) 1-5, NONE /HPF    WBC Clumps, Urine FEW Reference range not established. /HPF    RBC, Urine >20 (A) NONE, 1-2, 3-5 /HPF    Bacteria, Urine 2+ (A) NONE SEEN /HPF    Mucus, Urine 1+ Reference range not established. /LPF    Hyaline Casts, Urine OCCASIONAL (A) NONE /LPF   CBC   Result Value Ref Range    WBC 10.9 4.4 - 11.3 x10*3/uL    nRBC 0.0 0.0 - 0.0 /100 WBCs    RBC 4.25 4.00 - 5.20 x10*6/uL    Hemoglobin 12.7 12.0 - 16.0 g/dL    Hematocrit 39.1 36.0 - 46.0 %    MCV 92 80 - 100 fL    MCH 29.9 26.0 - 34.0 pg    MCHC 32.5 32.0 - 36.0 g/dL    RDW 14.5 11.5 - 14.5 %    Platelets 454 (H) 150 - 450 x10*3/uL   Comprehensive Metabolic Panel   Result Value Ref Range    Glucose 122 (H) 74 - 99 mg/dL    Sodium 131 (L) 136 - 145 mmol/L    Potassium 3.9 3.5 - 5.3 mmol/L    Chloride 100 98 - 107 mmol/L    Bicarbonate 21 21 - 32 mmol/L    Anion Gap 14 10 - 20 mmol/L    Urea Nitrogen 20 6 - 23 mg/dL    Creatinine 0.80 0.50 - 1.05 mg/dL    eGFR 85 >60 mL/min/1.73m*2    Calcium 8.6 8.6 - 10.3 mg/dL    Albumin 3.5 3.4 - 5.0 g/dL    Alkaline Phosphatase 72 33 - 110 U/L    Total Protein 6.4 6.4 - 8.2 g/dL    AST 12 9 - 39  U/L    Bilirubin, Total 0.3 0.0 - 1.2 mg/dL    ALT 13 7 - 45 U/L     XR chest abdomen for OG NG placement   Final Result   1.  Nasogastric tube with the tip in the mid stomach and side hole   beyond the gastroesophageal junction.   2. Moderately dilated proximal small bowel. The lower abdomen and   pelvis are not included.   3.  Mild left basilar atelectasis.             MACRO:   None        Signed by: Sonal Mckeon 2/11/2024 7:54 AM   Dictation workstation:   [id number]      CT head wo IV contrast   Final Result   No evidence of acute cortical infarct or intracranial hemorrhage.        MACRO:   None             Signed by: Zackery Purvis 2/9/2024 8:01 PM   Dictation workstation:   [id number]            Assessment/Plan   Principal Problem:    Rectal adenocarcinoma (CMS/HCC)  Active Problems:    Rectal cancer (CMS/HCC)    Tracey Carranza is a 58 y.o. female on day 5 of admission presenting with Rectal adenocarcinoma (CMS/HCC). Now POD#5 s/p Robot Assisted Laparoscopic Sigmoid Colon and Rectum Resection, Right Oophrectomy. Post operative findings significant for No metastatic disease, No reach issues, no leak to air leak testing, both sides of the anastomosis appear healthy and pink without bleeding.       GI: Abdomen slightly distended, soft, appropriately tender, no peritoneal signs, laparotomy sites c/d/i, well approximate with Dermabond, no erythema or drainage. Ostomy with soft mushy stool, belt in place. Stoma is pink, moist, beefy, well pouched. HOLA drain with SS output- stripped at the bedside.  Hx: rectal adenocarcinoma, GERD  - NGT to LIWS  - NPO->sips of clears, ice chips, popsicles for comfort   - IVF - D5+1/2NS with 20 meq K+ at 100/ml  - Ensure surgery TID x 5 days- when able   - Nutrition Consulted   - daily PPI, PRN TUMS  - added lozenges and chloraseptic spray for sore throat   - OOB/Up to chairs for meals  - Ambulate in boone 5x day  - Antiemetics as needed, scop patch in place  - Drain care  Qshift  - Enterostomal nurse following  --> ashvin removed 2/9    Neuro:  Acute post op Pain expected, well controlled; OARRS reviewed  Hx: acute cervical radiculopathy, meralgia paresthetica of right side, RA   - Continue current pain control regimen  - lidocaine patch   - continue home amitriptyline   - 30 Day MME 0.00    Cardiac: VSS  Hx: HLD  - continue home statin    Respiratory: on RA, no increased WOB   - Encourage IS Q1 Hour  - Cough, deep breath    RENAL: Creatinine 0.80, BUN 20, Na 131  Voiding adequate amounts; lytes unremarkable  -  UA positive for UTI on 2/10, will follow up cultures   - Will start IV Cipro  - Monitor and treat as needed  - continue flomax daily   - continue stephenson catheter   - repeat BMP in the AM    HEME:  H/Hct 12.7/39.1, plts 454- stable   -BOONE/SCD/Loveox  - repeat CBC in the AM  - will need Lovenox for home    Endo  Hx: Hypothyroidism   - continue home levothyroxine     ID: WBC 10.9  Afebrile   -Monitor for s/sx of infections     Dispo: Discussed with Dr. Presley, will continue NGT today, sips of clear liquids for comfort, would anticipate discharge in the next 48 hours.     I spent 35 minutes in the professional and overall care of this patient.    Addendum: Patient seen at the bedside this afternoon, continues to have discomfort with the NGT but overall doing better, abd is softer, less distended.   Liliana Mcbride, APRN-CNP

## 2024-02-11 NOTE — CARE PLAN
The patient's goals for the shift include      The clinical goals for the shift include Patient will remain HDS, safe and free from falls this shift.    Over the shift, the patient did make progress toward the following goals. Barriers to progression include acuteness of illness. Recommendations to address these barriers include symptom management.

## 2024-02-11 NOTE — CARE PLAN
Problem: Fall/Injury  Goal: Be free from injury by end of the shift  Outcome: Progressing   The patient's goals for the shift include      The clinical goals for the shift include Pt will remain safe and free of falls throughout shift

## 2024-02-11 NOTE — PROGRESS NOTES
Met with patient at the bedside to discuss discharge plan.  Patient is day 5 of admission presenting with Rectal adenocarcinoma (CMS/HCC).   POD#5 s/p Robot Assisted Laparoscopic Sigmoid Colon and Rectum Resection, Right Oophrectomy.   PLAN: continue with NGT, clear liquid sips  DISP: home with [facility]  ADOD: 1-2 days  BARRIER: tolerate diet  Guerline Lal RN

## 2024-02-12 ENCOUNTER — APPOINTMENT (OUTPATIENT)
Dept: CARDIOLOGY | Facility: HOSPITAL | Age: 59
DRG: 330 | End: 2024-02-12
Payer: MEDICARE

## 2024-02-12 LAB
ALBUMIN SERPL BCP-MCNC: 3.2 G/DL (ref 3.4–5)
ALP SERPL-CCNC: 65 U/L (ref 33–110)
ALT SERPL W P-5'-P-CCNC: 8 U/L (ref 7–45)
ANION GAP SERPL CALC-SCNC: 10 MMOL/L (ref 10–20)
AST SERPL W P-5'-P-CCNC: 8 U/L (ref 9–39)
BILIRUB SERPL-MCNC: 0.3 MG/DL (ref 0–1.2)
BUN SERPL-MCNC: 12 MG/DL (ref 6–23)
CALCIUM SERPL-MCNC: 8.6 MG/DL (ref 8.6–10.3)
CHLORIDE SERPL-SCNC: 101 MMOL/L (ref 98–107)
CO2 SERPL-SCNC: 25 MMOL/L (ref 21–32)
CREAT SERPL-MCNC: 0.67 MG/DL (ref 0.5–1.05)
EGFRCR SERPLBLD CKD-EPI 2021: >90 ML/MIN/1.73M*2
ERYTHROCYTE [DISTWIDTH] IN BLOOD BY AUTOMATED COUNT: 14.4 % (ref 11.5–14.5)
GLUCOSE SERPL-MCNC: 129 MG/DL (ref 74–99)
HCT VFR BLD AUTO: 36 % (ref 36–46)
HGB BLD-MCNC: 11.8 G/DL (ref 12–16)
MCH RBC QN AUTO: 30.6 PG (ref 26–34)
MCHC RBC AUTO-ENTMCNC: 32.8 G/DL (ref 32–36)
MCV RBC AUTO: 93 FL (ref 80–100)
NRBC BLD-RTO: 0 /100 WBCS (ref 0–0)
PLATELET # BLD AUTO: 442 X10*3/UL (ref 150–450)
POTASSIUM SERPL-SCNC: 4.5 MMOL/L (ref 3.5–5.3)
PROT SERPL-MCNC: 5.9 G/DL (ref 6.4–8.2)
RBC # BLD AUTO: 3.86 X10*6/UL (ref 4–5.2)
SODIUM SERPL-SCNC: 131 MMOL/L (ref 136–145)
WBC # BLD AUTO: 13.6 X10*3/UL (ref 4.4–11.3)

## 2024-02-12 PROCEDURE — 2500000001 HC RX 250 WO HCPCS SELF ADMINISTERED DRUGS (ALT 637 FOR MEDICARE OP)

## 2024-02-12 PROCEDURE — 2500000004 HC RX 250 GENERAL PHARMACY W/ HCPCS (ALT 636 FOR OP/ED)

## 2024-02-12 PROCEDURE — 93005 ELECTROCARDIOGRAM TRACING: CPT

## 2024-02-12 PROCEDURE — 85027 COMPLETE CBC AUTOMATED: CPT

## 2024-02-12 PROCEDURE — C9113 INJ PANTOPRAZOLE SODIUM, VIA: HCPCS

## 2024-02-12 PROCEDURE — 99233 SBSQ HOSP IP/OBS HIGH 50: CPT

## 2024-02-12 PROCEDURE — 94760 N-INVAS EAR/PLS OXIMETRY 1: CPT

## 2024-02-12 PROCEDURE — 2500000004 HC RX 250 GENERAL PHARMACY W/ HCPCS (ALT 636 FOR OP/ED): Performed by: COLON & RECTAL SURGERY

## 2024-02-12 PROCEDURE — 2500000001 HC RX 250 WO HCPCS SELF ADMINISTERED DRUGS (ALT 637 FOR MEDICARE OP): Performed by: COLON & RECTAL SURGERY

## 2024-02-12 PROCEDURE — 1100000001 HC PRIVATE ROOM DAILY

## 2024-02-12 PROCEDURE — 80053 COMPREHEN METABOLIC PANEL: CPT

## 2024-02-12 PROCEDURE — 2500000002 HC RX 250 W HCPCS SELF ADMINISTERED DRUGS (ALT 637 FOR MEDICARE OP, ALT 636 FOR OP/ED)

## 2024-02-12 PROCEDURE — 2500000005 HC RX 250 GENERAL PHARMACY W/O HCPCS

## 2024-02-12 PROCEDURE — 2500000002 HC RX 250 W HCPCS SELF ADMINISTERED DRUGS (ALT 637 FOR MEDICARE OP, ALT 636 FOR OP/ED): Performed by: COLON & RECTAL SURGERY

## 2024-02-12 RX ORDER — HYDROXYZINE HYDROCHLORIDE 25 MG/1
50 TABLET, FILM COATED ORAL ONCE
Status: COMPLETED | OUTPATIENT
Start: 2024-02-12 | End: 2024-02-12

## 2024-02-12 RX ADMIN — CIPROFLOXACIN 400 MG: 400 INJECTION, SOLUTION INTRAVENOUS at 09:31

## 2024-02-12 RX ADMIN — TAMSULOSIN HYDROCHLORIDE 0.4 MG: 0.4 CAPSULE ORAL at 09:32

## 2024-02-12 RX ADMIN — ENOXAPARIN SODIUM 40 MG: 40 INJECTION SUBCUTANEOUS at 14:34

## 2024-02-12 RX ADMIN — CIPROFLOXACIN 400 MG: 400 INJECTION, SOLUTION INTRAVENOUS at 22:15

## 2024-02-12 RX ADMIN — AMITRIPTYLINE HYDROCHLORIDE 25 MG: 25 TABLET, FILM COATED ORAL at 22:15

## 2024-02-12 RX ADMIN — METHOCARBAMOL 1000 MG: 1000 INJECTION, SOLUTION INTRAMUSCULAR; INTRAVENOUS at 10:21

## 2024-02-12 RX ADMIN — PANTOPRAZOLE SODIUM 40 MG: 40 INJECTION, POWDER, FOR SOLUTION INTRAVENOUS at 06:29

## 2024-02-12 RX ADMIN — SCOLOPAMINE TRANSDERMAL SYSTEM 1 PATCH: 1 PATCH, EXTENDED RELEASE TRANSDERMAL at 12:30

## 2024-02-12 RX ADMIN — HYDROXYZINE HYDROCHLORIDE 50 MG: 25 TABLET ORAL at 22:15

## 2024-02-12 RX ADMIN — POTASSIUM CHLORIDE, DEXTROSE MONOHYDRATE AND SODIUM CHLORIDE 100 ML/HR: 150; 5; 450 INJECTION, SOLUTION INTRAVENOUS at 18:05

## 2024-02-12 RX ADMIN — LIDOCAINE 1 PATCH: 4 PATCH TOPICAL at 09:32

## 2024-02-12 RX ADMIN — LEVOTHYROXINE SODIUM 88 MCG: 88 TABLET ORAL at 06:29

## 2024-02-12 RX ADMIN — METHOCARBAMOL 1000 MG: 1000 INJECTION, SOLUTION INTRAMUSCULAR; INTRAVENOUS at 18:32

## 2024-02-12 RX ADMIN — POTASSIUM CHLORIDE, DEXTROSE MONOHYDRATE AND SODIUM CHLORIDE 100 ML/HR: 150; 5; 450 INJECTION, SOLUTION INTRAVENOUS at 04:29

## 2024-02-12 ASSESSMENT — PAIN SCALES - GENERAL
PAINLEVEL_OUTOF10: 0 - NO PAIN

## 2024-02-12 ASSESSMENT — COGNITIVE AND FUNCTIONAL STATUS - GENERAL
MOVING TO AND FROM BED TO CHAIR: A LITTLE
DRESSING REGULAR UPPER BODY CLOTHING: A LITTLE
TOILETING: A LITTLE
TURNING FROM BACK TO SIDE WHILE IN FLAT BAD: A LITTLE
STANDING UP FROM CHAIR USING ARMS: A LITTLE
CLIMB 3 TO 5 STEPS WITH RAILING: A LOT
MOBILITY SCORE: 18
WALKING IN HOSPITAL ROOM: A LITTLE
DAILY ACTIVITIY SCORE: 19
DRESSING REGULAR LOWER BODY CLOTHING: A LITTLE
PERSONAL GROOMING: A LITTLE
HELP NEEDED FOR BATHING: A LITTLE

## 2024-02-12 ASSESSMENT — PAIN - FUNCTIONAL ASSESSMENT
PAIN_FUNCTIONAL_ASSESSMENT: 0-10

## 2024-02-12 NOTE — CONSULTS
"  Ostomy Care Consult     Visit Date: 2/12/2024      Patient Name: Tracey Carranza         MRN: [id number]           YOB: 1965     WO nursing visit outcome: Leaking pouch was changed. Hands-on lesson was completed and Ms. Carranza participated fully. Pouching system was modified to a deeper convex product.      [facility] next scheduled visit/plan: to assess tomorrow, if new system works, dc supplies and list will be updated    Stoma Type: Loop Ileostomy  Rickey: No - removed previously  Diameter: about 1 1/4\"  Location: RUQ  Protrusion: Protrudes slightly  Mucosal Condition and Color:  moist, red, thin band of gray slough through stoma center from 9 to 3 o'clock  Mucocutaneous Junction: Intact  Peristomal Skin: Erythema  Location of Skin Impairment: greatest medially  Peristomal Contour: Shallow Concave and varies with position, more convex on sitting  Supportive Tissue: Soft  Character of Output: Green and Slightly Thick  Emptying Frequency: per nursing  Removed/Current Pouching System: 1 3/4\" Polly New Image standard convex wafer, barrier ring and stoma paste, lock n' roll pouch, belt, wafer extender along inferior side    Current Wearing Time: 3 Days    Recommendations:   Skin Care: stoma powder to denuded skin as needed with pouch change  Pouching System: applied 1 3/4\" Con[employer]ec Convex-It with 1 1/4\" aperture, CeraRing, Natura Invisiclose pouch, Belt   Wear Time: 3-4 Days (goal)  Other: TBD    Supplier:  TBD - should have [facility] on discharge    Comments: One set of [employer] supplies and several Polly supplies are at bedside. These will be updated before discharge.    Time Increment: 30 minutes    Arti Silveira, KEYLAN, RN, CWOCN     2/12/2024  11:42 AM        "

## 2024-02-12 NOTE — CARE PLAN
Problem: Fall/Injury  Goal: Be free from injury by end of the shift  Outcome: Progressing   The patient's goals for the shift include      The clinical goals for the shift include Patient will remain HDS, safe and free from falls this shift.

## 2024-02-12 NOTE — PROGRESS NOTES
"As Tracey Carranza is a 58 y.o. female on day 6 of admission presenting with Rectal adenocarcinoma (CMS/HCC).    Subjective   Patient is awake in bed this morning. She reports feeling well overall. She has some discomfort from NGT    Objective     Physical Exam  Constitutional:       Appearance: Normal appearance.   HENT:      Nose:      Comments: NGT in place clamped  Cardiovascular:      Rate and Rhythm: Normal rate and regular rhythm.   Pulmonary:      Effort: Pulmonary effort is normal.      Comments: Non labored breathing on RA  Abdominal:      General: There is distension.      Palpations: Abdomen is soft.      Tenderness: There is no abdominal tenderness.      Comments: No oozing or erythema noted around the HOLA drain site 65cc- SS. Stoma is beefy red, moist, producing 275 ml green mushy stool, and gas, well pouched.      Genitourinary:     Comments: Alexander in place  Skin:     General: Skin is warm and dry.   Neurological:      General: No focal deficit present.      Mental Status: She is alert and oriented to person, place, and time. Mental status is at baseline.   Psychiatric:         Attention and Perception: Attention normal.         Mood and Affect: Mood normal.         Speech: Speech normal.         Behavior: Behavior normal.         Cognition and Memory: Cognition normal.         Last Recorded Vitals  Blood pressure 116/62, pulse 68, temperature 36.2 °C (97.2 °F), temperature source Temporal, resp. rate 18, height 1.651 m (5' 5\"), weight 91.1 kg (200 lb 13.4 oz), SpO2 100 %.  Intake/Output last 3 Shifts:  I/O last 3 completed shifts:  In: 2492.5 (27.4 mL/kg) [P.O.:100; IV Piggyback:2392.5]  Out: 4090 (44.9 mL/kg) [Urine:750 (0.2 mL/kg/hr); Emesis/NG output:2650; Drains:140; Stool:550]  Weight: 91.1 kg     Relevant Results  Scheduled medications  [Held by provider] acetaminophen, 650 mg, oral, q6h  amitriptyline, 25 mg, oral, Nightly  [Held by provider] atorvastatin, 40 mg, oral, Daily  ciprofloxacin, " 400 mg, intravenous, q12h  enoxaparin, 40 mg, subcutaneous, q24h  [Held by provider] gabapentin, 300 mg, oral, TID  levothyroxine, 88 mcg, oral, Daily before breakfast  lidocaine, 1 patch, transdermal, Daily  melatonin, 6 mg, oral, Nightly  methocarbamol, 1,000 mg, intravenous, q8h  oxygen, , inhalation, Continuous - 02/gases  pantoprazole, 40 mg, intravenous, Daily before breakfast  scopolamine, 1 patch, transdermal, q72h  tamsulosin, 0.4 mg, oral, Daily      Continuous medications  potassium hmvlung-C1-8.45%NaCl, 100 mL/hr, Last Rate: 100 mL/hr (02/12/24 0429)      PRN medications  PRN medications: benzocaine-menthol, calcium carbonate, HYDROmorphone, naloxone, ondansetron ODT **OR** ondansetron, oxyCODONE, oxyCODONE, phenoL  Results for orders placed or performed during the hospital encounter of 02/06/24 (from the past 24 hour(s))   CBC   Result Value Ref Range    WBC 13.6 (H) 4.4 - 11.3 x10*3/uL    nRBC 0.0 0.0 - 0.0 /100 WBCs    RBC 3.86 (L) 4.00 - 5.20 x10*6/uL    Hemoglobin 11.8 (L) 12.0 - 16.0 g/dL    Hematocrit 36.0 36.0 - 46.0 %    MCV 93 80 - 100 fL    MCH 30.6 26.0 - 34.0 pg    MCHC 32.8 32.0 - 36.0 g/dL    RDW 14.4 11.5 - 14.5 %    Platelets 442 150 - 450 x10*3/uL   Comprehensive Metabolic Panel   Result Value Ref Range    Glucose 129 (H) 74 - 99 mg/dL    Sodium 131 (L) 136 - 145 mmol/L    Potassium 4.5 3.5 - 5.3 mmol/L    Chloride 101 98 - 107 mmol/L    Bicarbonate 25 21 - 32 mmol/L    Anion Gap 10 10 - 20 mmol/L    Urea Nitrogen 12 6 - 23 mg/dL    Creatinine 0.67 0.50 - 1.05 mg/dL    eGFR >90 >60 mL/min/1.73m*2    Calcium 8.6 8.6 - 10.3 mg/dL    Albumin 3.2 (L) 3.4 - 5.0 g/dL    Alkaline Phosphatase 65 33 - 110 U/L    Total Protein 5.9 (L) 6.4 - 8.2 g/dL    AST 8 (L) 9 - 39 U/L    Bilirubin, Total 0.3 0.0 - 1.2 mg/dL    ALT 8 7 - 45 U/L     XR chest abdomen for OG NG placement   Final Result   1.  Nasogastric tube with the tip in the mid stomach and side hole   beyond the gastroesophageal junction.    2. Moderately dilated proximal small bowel. The lower abdomen and   pelvis are not included.   3.  Mild left basilar atelectasis.             MACRO:   None        Signed by: Sonal Mckeon 2/11/2024 7:54 AM   Dictation workstation:   [id number]      CT head wo IV contrast   Final Result   No evidence of acute cortical infarct or intracranial hemorrhage.        MACRO:   None             Signed by: Zackery Purvis 2/9/2024 8:01 PM   Dictation workstation:   [id number]                Assessment/Plan   Principal Problem:    Rectal adenocarcinoma (CMS/HCC)  Active Problems:    Rectal cancer (CMS/HCC)    Neuro: Acute Post-op pain, well controlled  - OOB ambulate as tolerated at least 5 times per day  - Continue current pain control regimen  - OOB more today     Cardiovascular: HR: 68    BP: 116/62     Hx: HLD  - VS Q4h    Pulmonary: Non labored breathing on RA  - Encourage IS  - Encourage ambulation    Gastrointestinal: POD 6 Robot Assisted Laparoscopic Sigmoid Colon and Rectum Resection, Right Oophrectomy Findings: No metastatic disease, No reach issues, no leak to air leak testing, both sides of the anastomosis appear healthy and pink without bleeding.        Hx: Barretts esophagus, rectal cancer   - NGT Clamped  - CLD  - Ensure shakes  - HOLA drain care  - Ostomy nurse following  - Rickey removed today  - PRN antiemetic  - continue current bowel regimen     Genitourinary: Alexander in place  - Continue IVF  - Monitor I&Os   - Cr: 0.87  - Continue to trend electrolytes with daily labs    Hematology:   - H/H: 12.7/39.1 -> 11.8/36.0  - No active s/s of bleeding  - DVT Prophylaxis: SCDs/ Lovenox  - Will need Lovenox on DC    Infectious Disease:   - WBC: 10.9 -> 13.6  - UTI     - Continue IV Cipro  - Afebrile, VSS  - Continue to monitor for s/s of infection  - Continue to monitor WBC with daily CBC     Dispo: Will trial clamp and Clears. Discussed plan with Dr. Presley.       I spent 35 minutes in the professional and overall  care of this patient.      Moses Brooke PA-C      Pt feeling better today, abdomen less tender and bloated, no vomiting since NGT went in, low UOP    Gen -well appearing lying in bed  Abd - soft NT ND, stoma leaking pink appearing with thick green stool ,drain with SSD incision CDI  Extr - no edema    Impression - Pt s/p LAR with ileus - appears to be resolving, urinary retention  Plan   Clamp NG today if <150 ml then pull NG  OOB as much as she can  Lovenox/SCD's   Minimize narcotics  Flomax and stephenson out trial first think in the AM   Abx for UTI - follow up on cultures

## 2024-02-12 NOTE — CARE PLAN
The patient's goals for the shift include      The clinical goals for the shift include control pain throughout the shift    Over the shift, the patient did not make progress toward the following goals. Barriers to progression include   Problem: Fall/Injury  Goal: Be free from injury by end of the shift  Outcome: Progressing     Problem: Skin  Goal: Promote/optimize nutrition  Outcome: Progressing     Problem: Pain  Goal: Walks with improved pain control throughout the shift  Outcome: Progressing   . Recommendations to address these barriers include .

## 2024-02-13 VITALS
HEIGHT: 65 IN | OXYGEN SATURATION: 99 % | DIASTOLIC BLOOD PRESSURE: 85 MMHG | TEMPERATURE: 98.3 F | RESPIRATION RATE: 16 BRPM | SYSTOLIC BLOOD PRESSURE: 163 MMHG | HEART RATE: 82 BPM | WEIGHT: 200.84 LBS | BODY MASS INDEX: 33.46 KG/M2

## 2024-02-13 LAB
ALBUMIN SERPL BCP-MCNC: 3 G/DL (ref 3.4–5)
ALP SERPL-CCNC: 63 U/L (ref 33–110)
ALT SERPL W P-5'-P-CCNC: 8 U/L (ref 7–45)
ANION GAP SERPL CALC-SCNC: 10 MMOL/L (ref 10–20)
AST SERPL W P-5'-P-CCNC: 9 U/L (ref 9–39)
BILIRUB SERPL-MCNC: 0.3 MG/DL (ref 0–1.2)
BUN SERPL-MCNC: 6 MG/DL (ref 6–23)
CALCIUM SERPL-MCNC: 8.5 MG/DL (ref 8.6–10.3)
CHLORIDE SERPL-SCNC: 102 MMOL/L (ref 98–107)
CO2 SERPL-SCNC: 22 MMOL/L (ref 21–32)
CREAT SERPL-MCNC: 0.58 MG/DL (ref 0.5–1.05)
EGFRCR SERPLBLD CKD-EPI 2021: >90 ML/MIN/1.73M*2
ERYTHROCYTE [DISTWIDTH] IN BLOOD BY AUTOMATED COUNT: 14.1 % (ref 11.5–14.5)
GLUCOSE SERPL-MCNC: 124 MG/DL (ref 74–99)
HCT VFR BLD AUTO: 37.2 % (ref 36–46)
HGB BLD-MCNC: 11.7 G/DL (ref 12–16)
MCH RBC QN AUTO: 30.1 PG (ref 26–34)
MCHC RBC AUTO-ENTMCNC: 31.5 G/DL (ref 32–36)
MCV RBC AUTO: 96 FL (ref 80–100)
NRBC BLD-RTO: 0 /100 WBCS (ref 0–0)
PLATELET # BLD AUTO: 402 X10*3/UL (ref 150–450)
POTASSIUM SERPL-SCNC: 4.3 MMOL/L (ref 3.5–5.3)
PROT SERPL-MCNC: 5.4 G/DL (ref 6.4–8.2)
RBC # BLD AUTO: 3.89 X10*6/UL (ref 4–5.2)
SODIUM SERPL-SCNC: 130 MMOL/L (ref 136–145)
WBC # BLD AUTO: 14.3 X10*3/UL (ref 4.4–11.3)

## 2024-02-13 PROCEDURE — 2500000004 HC RX 250 GENERAL PHARMACY W/ HCPCS (ALT 636 FOR OP/ED)

## 2024-02-13 PROCEDURE — 36415 COLL VENOUS BLD VENIPUNCTURE: CPT

## 2024-02-13 PROCEDURE — 2500000004 HC RX 250 GENERAL PHARMACY W/ HCPCS (ALT 636 FOR OP/ED): Performed by: COLON & RECTAL SURGERY

## 2024-02-13 PROCEDURE — 2500000002 HC RX 250 W HCPCS SELF ADMINISTERED DRUGS (ALT 637 FOR MEDICARE OP, ALT 636 FOR OP/ED)

## 2024-02-13 PROCEDURE — C9113 INJ PANTOPRAZOLE SODIUM, VIA: HCPCS

## 2024-02-13 PROCEDURE — 2500000001 HC RX 250 WO HCPCS SELF ADMINISTERED DRUGS (ALT 637 FOR MEDICARE OP): Performed by: COLON & RECTAL SURGERY

## 2024-02-13 PROCEDURE — 84075 ASSAY ALKALINE PHOSPHATASE: CPT

## 2024-02-13 PROCEDURE — 85027 COMPLETE CBC AUTOMATED: CPT

## 2024-02-13 PROCEDURE — 99231 SBSQ HOSP IP/OBS SF/LOW 25: CPT

## 2024-02-13 PROCEDURE — 2500000002 HC RX 250 W HCPCS SELF ADMINISTERED DRUGS (ALT 637 FOR MEDICARE OP, ALT 636 FOR OP/ED): Performed by: COLON & RECTAL SURGERY

## 2024-02-13 PROCEDURE — 2500000005 HC RX 250 GENERAL PHARMACY W/O HCPCS

## 2024-02-13 RX ORDER — SULFAMETHOXAZOLE AND TRIMETHOPRIM 800; 160 MG/1; MG/1
1 TABLET ORAL 2 TIMES DAILY
Qty: 6 TABLET | Refills: 0 | Status: SHIPPED | OUTPATIENT
Start: 2024-02-13 | End: 2024-02-16

## 2024-02-13 RX ORDER — OXYCODONE HYDROCHLORIDE 5 MG/1
5 TABLET ORAL EVERY 6 HOURS PRN
Qty: 20 TABLET | Refills: 0 | Status: SHIPPED | OUTPATIENT
Start: 2024-02-13 | End: 2024-02-20

## 2024-02-13 RX ORDER — ACETAMINOPHEN 325 MG/1
650 TABLET ORAL EVERY 6 HOURS
Qty: 112 TABLET | Refills: 0 | Status: SHIPPED | OUTPATIENT
Start: 2024-02-13 | End: 2024-02-27

## 2024-02-13 RX ORDER — IBUPROFEN 600 MG/1
600 TABLET ORAL EVERY 6 HOURS PRN
Qty: 54 TABLET | Refills: 0 | Status: SHIPPED | OUTPATIENT
Start: 2024-02-13 | End: 2024-02-27

## 2024-02-13 RX ORDER — ONDANSETRON 4 MG/1
4 TABLET, FILM COATED ORAL EVERY 8 HOURS PRN
Qty: 20 TABLET | Refills: 0 | Status: SHIPPED | OUTPATIENT
Start: 2024-02-13 | End: 2024-02-20

## 2024-02-13 RX ORDER — TAMSULOSIN HYDROCHLORIDE 0.4 MG/1
0.4 CAPSULE ORAL DAILY
Qty: 7 CAPSULE | Refills: 0 | Status: SHIPPED | OUTPATIENT
Start: 2024-02-14 | End: 2024-02-21

## 2024-02-13 RX ORDER — ENOXAPARIN SODIUM 100 MG/ML
40 INJECTION SUBCUTANEOUS EVERY 24 HOURS
Qty: 28 EACH | Refills: 0 | Status: SHIPPED | OUTPATIENT
Start: 2024-02-13 | End: 2024-03-12

## 2024-02-13 RX ADMIN — METHOCARBAMOL 1000 MG: 1000 INJECTION, SOLUTION INTRAMUSCULAR; INTRAVENOUS at 12:46

## 2024-02-13 RX ADMIN — ENOXAPARIN SODIUM 40 MG: 40 INJECTION SUBCUTANEOUS at 15:02

## 2024-02-13 RX ADMIN — CIPROFLOXACIN 400 MG: 400 INJECTION, SOLUTION INTRAVENOUS at 09:04

## 2024-02-13 RX ADMIN — LEVOTHYROXINE SODIUM 88 MCG: 88 TABLET ORAL at 06:14

## 2024-02-13 RX ADMIN — POTASSIUM CHLORIDE, DEXTROSE MONOHYDRATE AND SODIUM CHLORIDE 100 ML/HR: 150; 5; 450 INJECTION, SOLUTION INTRAVENOUS at 06:14

## 2024-02-13 RX ADMIN — PANTOPRAZOLE SODIUM 40 MG: 40 INJECTION, POWDER, FOR SOLUTION INTRAVENOUS at 06:14

## 2024-02-13 RX ADMIN — METHOCARBAMOL 1000 MG: 1000 INJECTION, SOLUTION INTRAMUSCULAR; INTRAVENOUS at 06:14

## 2024-02-13 RX ADMIN — TAMSULOSIN HYDROCHLORIDE 0.4 MG: 0.4 CAPSULE ORAL at 09:01

## 2024-02-13 RX ADMIN — LIDOCAINE 1 PATCH: 4 PATCH TOPICAL at 09:00

## 2024-02-13 RX ADMIN — OXYCODONE HYDROCHLORIDE 10 MG: 5 TABLET ORAL at 00:53

## 2024-02-13 RX ADMIN — CEFTRIAXONE 2 G: 2 INJECTION, POWDER, FOR SOLUTION INTRAMUSCULAR; INTRAVENOUS at 10:07

## 2024-02-13 ASSESSMENT — ACTIVITIES OF DAILY LIVING (ADL)
WALKS IN HOME: INDEPENDENT
DRESSING YOURSELF: INDEPENDENT
JUDGMENT_ADEQUATE_SAFELY_COMPLETE_DAILY_ACTIVITIES: YES
ADEQUATE_TO_COMPLETE_ADL: YES
HEARING - LEFT EAR: FUNCTIONAL
HEARING - RIGHT EAR: FUNCTIONAL
FEEDING YOURSELF: INDEPENDENT
GROOMING: INDEPENDENT
TOILETING: INDEPENDENT
BATHING: INDEPENDENT
PATIENT'S MEMORY ADEQUATE TO SAFELY COMPLETE DAILY ACTIVITIES?: YES

## 2024-02-13 ASSESSMENT — PAIN SCALES - GENERAL
PAINLEVEL_OUTOF10: 0 - NO PAIN
PAINLEVEL_OUTOF10: 7

## 2024-02-13 ASSESSMENT — PAIN - FUNCTIONAL ASSESSMENT
PAIN_FUNCTIONAL_ASSESSMENT: 0-10

## 2024-02-13 NOTE — CONSULTS
HOW TO CHANGE YOUR OSTOMY POUCH  Gather Supplies:  Soft paper towels or washcloths (Bounty, Brawny, Viva for example)  Non-lotion/Non-oily soap (Ivory® or Dial® are recommended)  Scissors with blunt tip  Wafer: ConvaTec Convex-It with 1 ¼” opening (#[id number])  Pouch: ConvaTec Natura Drainable pouch (#[id number])  Accessory products: measuring guide, adhesive removers, stoma paste or barrier ring, stoma powder, belt    Close the New Pouch:  Make a crease in the drainable pouch end for easier opening/emptying.   Close the pouch by rolling up 3 times and then seal with a “push, push, push” on the flap.  Set prepared pouch aside.   Prepare the New Pouch and Wafer:  Snap the pouch and wafer together.  Remove the plastic cover from the back of the baseplate.  Make a “dog ear” on one end of the paper that covers each side of the tape.  Stretch and apply the barrier ring to the back of the wafer. Pinching it down tightly to the baseplate. (OR Squeeze a bead of paste around the opening on the back of the pouch/baseplate.)  Set the prepared baseplate aside with the sticky side facing up.   Remove the Worn Pouch:  Empty the contents of the worn pouch into the toilet.   NOTE: Between pouch changes, be sure to empty pouch when no more than ½ full!  Wipe the end of the pouch clean using damp toilet tissue or paper towel.   Use the adhesive remover wipe or a soapy cloth and gentle pressure to remove the worn pouch skin.  Clean the Skin:  Wash the skin around the wound with non-lotion soap. Do this 3 times.  Rinse the skin with warm water. Do this 3 times.  Pat the skin dry using a dry washcloth or paper towel. Do this 3 times.  Apply stoma powder to any red or irritated skin.  Firmly brush the excess powder off.   Apply the New Pouch:  Center the pouch and baseplate over the stoma and push firmly it onto the skin.  Warm your hands by rubbing them together, then apply them over the pouch for a minute or two.  Apply the belt snugly, but  not tightly. 2 fingers should fit between the belt and the skin.    Check the size of the stoma at least weekly for the first 6 weeks following surgery.   The best time to change your pouch is first thing in the morning, before you eat or drink anything.    If you have questions about changing your pouch or if you encounter leaks or irritated skin please contact your Ostomy Nurse by calling [phone].

## 2024-02-13 NOTE — CONSULTS
Ostomy Supply List  PATIENT NAME: Tracey Carranza                                                                     DATE: 2024     STREET ADDRESS: [address] [place] [address]     CITY: [place]   STATE:   [place]                         ZIP:  [address]     PHONE: [phone]   :  1965     DIAGNOSIS: Rectal CA   STOMA TYPE: ileostomy      Item Order # Brand Description Qty/Unit 30 Day Use   Wafer  [id number] ConvaTec 1 ¾” Natura Convex-It with 1 ¼” opening 10/Box  Boxes   Pouch - drainable  [id number] ConvaTec 1 ¾” Natura drainable pouch 10/Box 1 Box   Adhesive Remover 7760 [employer] Adapt Universal Remover Wipes 50/Box 1 Box   Barrier Ring 8805 [employer]wick CeraRing  10/Box 1 Box   Stoma Paste   [id number] ConvaTec Stomahesive paste 1 Tube 1 Tubes   Stoma Powder   7906 [employer] Adapt stoma Powder 1 Bottle As Needed   Belt   7299 Polly Large Belt 1 Belt 1 Belt     Refill #: 12     Attending Physician:  Dr. Bryanna Presley   Office Phone: [phone]   Office Fax: [phone]     [facility] RN: DIONI Ryan, RN, CWOCN            Office Phone: [phone]

## 2024-02-13 NOTE — DISCHARGE SUMMARY
"Discharge Diagnosis  Rectal adenocarcinoma (CMS/HCC)    Issues Requiring Follow-Up  Patient will follow up with surgeon for post op check and pathology results     Test Results Pending At Discharge  Pending Labs       Order Current Status    Surgical Pathology Exam In process            Hospital Course  58 yr old female with known history of rectal cancer s/p Robot Assisted Laparoscopic Sigmoid Colon and Rectum Resection, Right Oophrectomy on 02/06/24 with Fernandez Multani . Please see operative report for full details. Patient tolerated the procedure well and recovered briefly in PACU before being transitioned to regular nursing floor. Post-op course was complicated by post op ileus requiring additional time in the acute care setting, ileus managed with bowel rest and decompression with NGT. Patient also experienced urinary retention likely related to anesthesia as well as positive UA, stephenson catheter was re-inserted on 2/10, removed today and passed TOV. NGT was removed and Diet was advanced as tolerated.  IV medication transitioned to oral as diet advanced. On the day of discharge, the pt was tolerating a diet, pain was controlled on PO pain medication, and they were ambulating and voiding spontaneously. Pt discharged to home on 02/13/24 in stable condition with instructions to follow up as outpatient, and home care set up for new ileostomy. Patient to complete 3 day course of PO antibiotics and continue Flomax for the next 7 days.    HOLA drain was removed at the bedside by this NP. Suture cut, suction removed from bulb. Drain was removed with tubing intact. Patient tolerated well. Guaze and paper tape placed over the incision, no active oozing or bleeding noted.     /70 (BP Location: Left arm, Patient Position: Lying)   Pulse 67   Temp 36.5 °C (97.7 °F) (Temporal)   Resp 18   Ht 1.651 m (5' 5\")   Wt 91.1 kg (200 lb 13.4 oz)   SpO2 99%   BMI 33.42 kg/m²       Pertinent Physical Exam At Time of " Discharge    PE:  Constitutional: A&Ox3, calm and cooperative  Eyes:, clear sclera   ENMT: Moist mucous membranes  Head/Neck: Neck supple  Cardiovascular: Normal rate and regular rhythm.   Respiratory/Thorax: CTAB, . Good symmetric chest expansion.   Gastrointestinal: Abdomen slightly distended-improved, soft, appropriately tender, no peritoneal signs, guaze and paper tape to LLQ where HOLA drain removed. Ostomy with belt, liquid brown stool, well pouched, pink, moist beefy stoma.   Genitourinary: Voiding independently   Musculoskeletal: ROM intact  Extremities: No peripheral edema  Neurological: A&Ox3  Psychological: Appropriate mood and behavior  Skin: Warm and dry      Home Medications     Medication List      START taking these medications     enoxaparin 40 mg/0.4 mL syringe; Commonly known as: Lovenox; Inject 0.4   mL (40 mg) under the skin once every 24 hours for 28 days.   ibuprofen 600 mg tablet; Take 1 tablet (600 mg) by mouth every 6 hours   if needed for mild pain (1 - 3) for up to 14 days.   ondansetron 4 mg tablet; Commonly known as: Zofran; Take 1 tablet (4 mg)   by mouth every 8 hours if needed for nausea or vomiting for up to 7 days.   oxyCODONE 5 mg immediate release tablet; Commonly known as: Roxicodone;   Take 1 tablet (5 mg) by mouth every 6 hours if needed for severe pain (7 -   10) for up to 7 days.   sulfamethoxazole-trimethoprim 800-160 mg tablet; Commonly known as:   Bactrim DS; Take 1 tablet by mouth 2 times a day for 3 days. Please take   first dose tomorrow.   tamsulosin 0.4 mg 24 hr capsule; Commonly known as: Flomax; Take 1   capsule (0.4 mg) by mouth once daily for 7 days. Do not start before   February 14, 2024.; Start taking on: February 14, 2024     CHANGE how you take these medications     acetaminophen 325 mg tablet; Commonly known as: Tylenol; Take 2 tablets   (650 mg) by mouth every 6 hours for 14 days.; What changed: medication   strength, how much to take, when to take this,  reasons to take this     CONTINUE taking these medications     amitriptyline 25 mg tablet; Commonly known as: Elavil   atorvastatin 40 mg tablet; Commonly known as: Lipitor; Take 1 tablet (40   mg) by mouth once daily.   cholecalciferol 50 MCG (2000 UT) tablet; Commonly known as: Vitamin D3;   Take 2 tablets (100 mcg) by mouth once daily.   folic acid 1 mg tablet; Commonly known as: Folvite   levothyroxine 88 mcg tablet; Commonly known as: Synthroid, Levoxyl; Take   1 tablet (88 mcg) by mouth once daily in the morning. Take before meals.   methotrexate 2.5 mg tablet; Commonly known as: Trexall   pantoprazole 40 mg EC tablet; Commonly known as: Protonix; Take 1 tablet   (40 mg) by mouth once daily.       Outpatient Follow-Up  Future Appointments   Date Time Provider Department [place]   3/13/2024 10:30 AM BERNIE Michel-CNP [id number] [facility]   4/29/2024 10:00 AM Marina Casper MD [id number] [facility]   10/22/2024 10:00 AM Marina Casper MD [id number] [facility]       BERNIE Davidson-CNP

## 2024-02-13 NOTE — CARE PLAN
The patient's goals for the shift include      The clinical goals for the shift include safety, pain    Over the shift, the patient did not make progress toward the following goals. Barriers to progression include   Problem: Fall/Injury  Goal: Be free from injury by end of the shift  Outcome: Progressing     Problem: Skin  Goal: Promote/optimize nutrition  Outcome: Progressing     Problem: Pain  Goal: Walks with improved pain control throughout the shift  Outcome: Progressing   . Recommendations to address these barriers include .

## 2024-02-13 NOTE — NURSING NOTE

## 2024-02-14 ENCOUNTER — DOCUMENTATION (OUTPATIENT)
Dept: HOME HEALTH SERVICES | Facility: HOME HEALTH | Age: 59
End: 2024-02-14
Payer: MEDICARE

## 2024-02-14 ENCOUNTER — HOME HEALTH ADMISSION (OUTPATIENT)
Dept: HOME HEALTH SERVICES | Facility: HOME HEALTH | Age: 59
End: 2024-02-14
Payer: MEDICARE

## 2024-02-14 NOTE — HH CARE COORDINATION
Home Care received a Referral for Nursing. We have processed the referral for a Start of Care on 02/15.     If you have any questions or concerns, please feel free to contact us at [phone]. Follow the prompts, enter your five digit zip code, and you will be directed to your care team on E[facility] 1.

## 2024-02-15 ENCOUNTER — HOME CARE VISIT (OUTPATIENT)
Dept: HOME HEALTH SERVICES | Facility: HOME HEALTH | Age: 59
End: 2024-02-15
Payer: MEDICARE

## 2024-02-15 VITALS
TEMPERATURE: 97 F | OXYGEN SATURATION: 97 % | SYSTOLIC BLOOD PRESSURE: 128 MMHG | HEART RATE: 80 BPM | DIASTOLIC BLOOD PRESSURE: 80 MMHG | RESPIRATION RATE: 18 BRPM

## 2024-02-15 PROCEDURE — 1090000002 HH PPS REVENUE DEBIT

## 2024-02-15 PROCEDURE — 169592 NO-PAY CLAIM PROCEDURE

## 2024-02-15 PROCEDURE — 1090000001 HH PPS REVENUE CREDIT

## 2024-02-15 PROCEDURE — G0299 HHS/HOSPICE OF RN EA 15 MIN: HCPCS | Mod: HHH

## 2024-02-15 PROCEDURE — 0023 HH SOC

## 2024-02-15 ASSESSMENT — ENCOUNTER SYMPTOMS
APPETITE LEVEL: GOOD
PAIN LOCATION: ABDOMEN
PAIN LOCATION - PAIN SEVERITY: 8/10
PAIN: 1
FATIGUES EASILY: 1

## 2024-02-15 ASSESSMENT — ACTIVITIES OF DAILY LIVING (ADL)
OASIS_M1830: 03
AMBULATION ASSISTANCE: STAND BY ASSIST
ENTERING_EXITING_HOME: STAND BY ASSIST
CURRENT_FUNCTION: STAND BY ASSIST

## 2024-02-16 PROCEDURE — 1090000001 HH PPS REVENUE CREDIT

## 2024-02-16 PROCEDURE — 1090000002 HH PPS REVENUE DEBIT

## 2024-02-17 PROCEDURE — 1090000002 HH PPS REVENUE DEBIT

## 2024-02-17 PROCEDURE — 1090000001 HH PPS REVENUE CREDIT

## 2024-02-18 PROCEDURE — 1090000002 HH PPS REVENUE DEBIT

## 2024-02-18 PROCEDURE — 1090000001 HH PPS REVENUE CREDIT

## 2024-02-19 ENCOUNTER — TELEPHONE (OUTPATIENT)
Dept: SURGERY | Facility: CLINIC | Age: 59
End: 2024-02-19
Payer: MEDICARE

## 2024-02-19 DIAGNOSIS — R11.0 NAUSEA: ICD-10-CM

## 2024-02-19 DIAGNOSIS — Z93.2 ILEOSTOMY IN PLACE (MULTI): ICD-10-CM

## 2024-02-19 PROCEDURE — 1090000002 HH PPS REVENUE DEBIT

## 2024-02-19 PROCEDURE — 1090000001 HH PPS REVENUE CREDIT

## 2024-02-19 RX ORDER — ONDANSETRON 4 MG/1
4 TABLET, ORALLY DISINTEGRATING ORAL EVERY 8 HOURS PRN
Qty: 20 TABLET | Refills: 0 | Status: SHIPPED | OUTPATIENT
Start: 2024-02-19 | End: 2024-02-26

## 2024-02-19 NOTE — TELEPHONE ENCOUNTER
"Post op call.  She is s/p 2/06/2024 Robot Assisted Laparoscopic Sigmoid Colon and Rectum Resection, Right Oophrectomy, loop ileostomy.  She reports that she is \"doing OK\".  Her pain \"is not too bad\".  Only taking as needed Tylenol.  Able to complete ADL's.  Just finished taking a shower.  She is measuring her ostomy outputs but not adding up the totals for 24 hours.  She does feel confident that her volume is under one liter per day.  The stool for the majority of the time is thick like mud.  No pouching issues.  No urination issues.  She does get hungry.  Finds that she has daily nausea without vomiting.  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  She does best when she eats soups/crackers.  Had mac and cheese yesterday and felt that she was going to vomit.  She does have some bloating.  No fevers.  Will call in more Zofran for her.  I advised to please add up your ostomy output totals and call if stools are going over 1 liter.  Zuly Thibodeaux RN    "

## 2024-02-20 ENCOUNTER — HOME CARE VISIT (OUTPATIENT)
Dept: HOME HEALTH SERVICES | Facility: HOME HEALTH | Age: 59
End: 2024-02-20
Payer: MEDICARE

## 2024-02-20 VITALS
OXYGEN SATURATION: 97 % | SYSTOLIC BLOOD PRESSURE: 124 MMHG | RESPIRATION RATE: 18 BRPM | HEART RATE: 77 BPM | DIASTOLIC BLOOD PRESSURE: 78 MMHG | TEMPERATURE: 98.2 F

## 2024-02-20 PROCEDURE — G0299 HHS/HOSPICE OF RN EA 15 MIN: HCPCS | Mod: HHH

## 2024-02-20 PROCEDURE — 1090000001 HH PPS REVENUE CREDIT

## 2024-02-20 PROCEDURE — 1090000002 HH PPS REVENUE DEBIT

## 2024-02-20 SDOH — ECONOMIC STABILITY: GENERAL

## 2024-02-20 ASSESSMENT — ENCOUNTER SYMPTOMS
PAIN: 1
APPETITE LEVEL: GOOD
PAIN LOCATION: INCISION

## 2024-02-20 ASSESSMENT — ACTIVITIES OF DAILY LIVING (ADL)
AMBULATION ASSISTANCE: STAND BY ASSIST
CURRENT_FUNCTION: STAND BY ASSIST

## 2024-02-21 ENCOUNTER — LAB (OUTPATIENT)
Dept: LAB | Facility: LAB | Age: 59
End: 2024-02-21
Payer: MEDICARE

## 2024-02-21 DIAGNOSIS — R11.0 NAUSEA: ICD-10-CM

## 2024-02-21 DIAGNOSIS — Z93.2 ILEOSTOMY IN PLACE (MULTI): ICD-10-CM

## 2024-02-21 LAB
ALBUMIN SERPL BCP-MCNC: 4.1 G/DL (ref 3.4–5)
ALP SERPL-CCNC: 91 U/L (ref 33–110)
ALT SERPL W P-5'-P-CCNC: 18 U/L (ref 7–45)
ANION GAP SERPL CALC-SCNC: 13 MMOL/L (ref 10–20)
AST SERPL W P-5'-P-CCNC: 11 U/L (ref 9–39)
BILIRUB SERPL-MCNC: 0.3 MG/DL (ref 0–1.2)
BUN SERPL-MCNC: 8 MG/DL (ref 6–23)
CALCIUM SERPL-MCNC: 10 MG/DL (ref 8.6–10.6)
CHLORIDE SERPL-SCNC: 102 MMOL/L (ref 98–107)
CO2 SERPL-SCNC: 27 MMOL/L (ref 21–32)
CREAT SERPL-MCNC: 0.83 MG/DL (ref 0.5–1.05)
EGFRCR SERPLBLD CKD-EPI 2021: 81 ML/MIN/1.73M*2
GLUCOSE SERPL-MCNC: 102 MG/DL (ref 74–99)
POTASSIUM SERPL-SCNC: 4.9 MMOL/L (ref 3.5–5.3)
PROT SERPL-MCNC: 7 G/DL (ref 6.4–8.2)
SODIUM SERPL-SCNC: 137 MMOL/L (ref 136–145)

## 2024-02-21 PROCEDURE — 80053 COMPREHEN METABOLIC PANEL: CPT

## 2024-02-21 PROCEDURE — 1090000001 HH PPS REVENUE CREDIT

## 2024-02-21 PROCEDURE — 36415 COLL VENOUS BLD VENIPUNCTURE: CPT

## 2024-02-21 PROCEDURE — 1090000002 HH PPS REVENUE DEBIT

## 2024-02-21 PROCEDURE — G0180 MD CERTIFICATION HHA PATIENT: HCPCS | Performed by: COLON & RECTAL SURGERY

## 2024-02-22 ENCOUNTER — OFFICE VISIT (OUTPATIENT)
Dept: UROLOGY | Facility: HOSPITAL | Age: 59
End: 2024-02-22
Payer: MEDICARE

## 2024-02-22 ENCOUNTER — TELEPHONE (OUTPATIENT)
Dept: SURGERY | Facility: CLINIC | Age: 59
End: 2024-02-22
Payer: MEDICARE

## 2024-02-22 DIAGNOSIS — R32 URINARY INCONTINENCE, UNSPECIFIED TYPE: ICD-10-CM

## 2024-02-22 DIAGNOSIS — N39.0 LOWER URINARY TRACT INFECTIOUS DISEASE: Primary | ICD-10-CM

## 2024-02-22 LAB
POC APPEARANCE, URINE: CLEAR
POC BILIRUBIN, URINE: NEGATIVE
POC BLOOD, URINE: NEGATIVE
POC COLOR, URINE: YELLOW
POC GLUCOSE, URINE: NEGATIVE MG/DL
POC KETONES, URINE: NEGATIVE MG/DL
POC LEUKOCYTES, URINE: NEGATIVE
POC NITRITE,URINE: NEGATIVE
POC PH, URINE: 5.5 PH
POC PROTEIN, URINE: NEGATIVE MG/DL
POC SPECIFIC GRAVITY, URINE: 1.01
POC UROBILINOGEN, URINE: 0.2 EU/DL

## 2024-02-22 PROCEDURE — 1090000001 HH PPS REVENUE CREDIT

## 2024-02-22 PROCEDURE — 99204 OFFICE O/P NEW MOD 45 MIN: CPT | Performed by: STUDENT IN AN ORGANIZED HEALTH CARE EDUCATION/TRAINING PROGRAM

## 2024-02-22 PROCEDURE — 87086 URINE CULTURE/COLONY COUNT: CPT | Performed by: STUDENT IN AN ORGANIZED HEALTH CARE EDUCATION/TRAINING PROGRAM

## 2024-02-22 PROCEDURE — 81003 URINALYSIS AUTO W/O SCOPE: CPT | Mod: QW | Performed by: STUDENT IN AN ORGANIZED HEALTH CARE EDUCATION/TRAINING PROGRAM

## 2024-02-22 PROCEDURE — 1090000002 HH PPS REVENUE DEBIT

## 2024-02-22 PROCEDURE — 99214 OFFICE O/P EST MOD 30 MIN: CPT | Performed by: STUDENT IN AN ORGANIZED HEALTH CARE EDUCATION/TRAINING PROGRAM

## 2024-02-22 PROCEDURE — 3008F BODY MASS INDEX DOCD: CPT | Performed by: STUDENT IN AN ORGANIZED HEALTH CARE EDUCATION/TRAINING PROGRAM

## 2024-02-22 NOTE — PROGRESS NOTES
Subjective   Patient ID: Tracey Carranza is a 58 y.o. female    HPI  58 y.o. female who presents for a urology follow-up after undergoing colon cancer removal surgery 8 days ago. She reports that she is recovering well but has experienced involuntary urination upon standing. She had a catheter placed during her hospital stay, which was removed and reinserted due to a fall. The catheter was removed again on February 13th before discharge, and she was able to urinate regularly afterward. However, she notes occasional incontinence, which she attributes to weakness from prolonged bed rest and multiple surgeries. She denies any other urinary symptoms such as pain, frequency, or urgency. She has not noticed any blood in her urine or changes in urine color.    The most recent Urinalysis, conducted on 2/22/2024, was unremarkable     Review of Systems    All systems were reviewed. Anything negative was noted in the HPI.    Objective   Physical Exam    General: Well developed, well nourished, alert and cooperative, appears in no acute distress   Eyes: Non-injected conjunctiva, sclera clear, no proptosis   Cardiac: Extremities are warm and well perfused. No edema, cyanosis or pallor   Lungs: Breathing is easy, non-labored. Speaking in clear and complete sentences. Normal diaphragmatic movement   MSK: Ambulatory with steady gait, unassisted   Neuro: Alert and oriented to person, place, and time   Psych: Demonstrates good judgment and reason, without hallucinations, abnormal affect or abnormal behaviors   Skin: No obvious lesions, no rashes       No CVA tenderness bilaterally   No suprapubic pain or discomfort       Past Medical History:   Diagnosis Date    Angulo esophagus     HLD (hyperlipidemia)     Hypothyroidism     Rectal adenocarcinoma (CMS/HCC)     Rheumatoid arthritis (CMS/HCC)     Warthin tumor          Past Surgical History:   Procedure Laterality Date    BACK SURGERY      Metronic device in back then removed 17  months later    CERVICAL SPINE SURGERY      MOUTH SURGERY      x2    NASAL SEPTUM SURGERY      PLANTAR FASCIA SURGERY Left     SALIVARY GLAND SURGERY Left     Left parotidectomy with facial nerve  dissection and preservation    SHOULDER SURGERY Right        Assessment/Plan   Postoperative urinary retention    58 y.o. female who presents for the above condition, We had a very long and extensive discussion with the patient regarding the pathophysiology, differential diagnosis, risk factor, management, natural history, incidence and diagnostic work-up of the condition.      Plan:  - Send urine for culture  - Cystoscopy in 2-3 weeks                Scribe Attestation  By signing my name below, I, Randee Mcgregor   attest that this documentation has been prepared under the direction and in the presence of Dr. Brian Sandy

## 2024-02-22 NOTE — TELEPHONE ENCOUNTER
Post op call.  2/06/2024 Robot Assisted Laparoscopic Sigmoid Colon and Rectum Resection, Right Oophrectomy   She is feeling better.  Nausea has resolved.  She reports that her ileostomy is working fine.  The stool consistency ranges from watery to thick.  The majority of the day it's  nice and thick.  No pouching issues. No leaks and denies redness.  She had lab work yesterday and creatinine was normal.  She denies fever/chills.  Has not passed mucous from the rectum yet.  She reports that she had a UTI in the hospital.  Was sent home on Bactrim but did not start taking as the pharmacy forgot to give it to her.  She now has the Bactrim and is taking it. She is c/o urinary incontinence.  Only occurs when she stands up.  Notes that she has to wear a pad.  No prior history of incontinence.  Overall much better.  Will update Dr. Presley on the urinary incontinence.  Zuly Thibodeaux RN

## 2024-02-22 NOTE — TELEPHONE ENCOUNTER
Spoke with Tracey and advised that Dr. Presley is requesting you to be seen in urology for your urinary incontinence.  She is worried about post op urinary retention.   You are going to need a bladder scan.  I called scheduling and they are able to get you today at 1:15 p at [facility] with Dr. Sandy.    Patient is very pleased and accepts this appointment.  Zuly Thibodeaux RN

## 2024-02-23 LAB — BACTERIA UR CULT: NORMAL

## 2024-02-23 PROCEDURE — 1090000002 HH PPS REVENUE DEBIT

## 2024-02-23 PROCEDURE — 1090000001 HH PPS REVENUE CREDIT

## 2024-02-24 PROCEDURE — 1090000002 HH PPS REVENUE DEBIT

## 2024-02-24 PROCEDURE — 1090000001 HH PPS REVENUE CREDIT

## 2024-02-25 PROCEDURE — 1090000001 HH PPS REVENUE CREDIT

## 2024-02-25 PROCEDURE — 1090000002 HH PPS REVENUE DEBIT

## 2024-02-26 ENCOUNTER — HOME CARE VISIT (OUTPATIENT)
Dept: HOME HEALTH SERVICES | Facility: HOME HEALTH | Age: 59
End: 2024-02-26
Payer: MEDICARE

## 2024-02-26 VITALS
TEMPERATURE: 97.4 F | OXYGEN SATURATION: 99 % | HEART RATE: 88 BPM | DIASTOLIC BLOOD PRESSURE: 70 MMHG | SYSTOLIC BLOOD PRESSURE: 130 MMHG | RESPIRATION RATE: 18 BRPM

## 2024-02-26 LAB
LABORATORY COMMENT REPORT: NORMAL
PATH REPORT.COMMENTS IMP SPEC: NORMAL
PATH REPORT.FINAL DX SPEC: NORMAL
PATH REPORT.GROSS SPEC: NORMAL
PATH REPORT.RELEVANT HX SPEC: NORMAL
PATH REPORT.TOTAL CANCER: NORMAL
PATHOLOGY SYNOPTIC REPORT: NORMAL

## 2024-02-26 PROCEDURE — 1090000001 HH PPS REVENUE CREDIT

## 2024-02-26 PROCEDURE — 1090000002 HH PPS REVENUE DEBIT

## 2024-02-26 PROCEDURE — G0299 HHS/HOSPICE OF RN EA 15 MIN: HCPCS | Mod: HHH

## 2024-02-26 SDOH — ECONOMIC STABILITY: GENERAL

## 2024-02-26 ASSESSMENT — ACTIVITIES OF DAILY LIVING (ADL)
CURRENT_FUNCTION: STAND BY ASSIST
AMBULATION ASSISTANCE: STAND BY ASSIST

## 2024-02-26 ASSESSMENT — ENCOUNTER SYMPTOMS
APPETITE LEVEL: GOOD
DENIES PAIN: 1

## 2024-02-27 PROCEDURE — 1090000002 HH PPS REVENUE DEBIT

## 2024-02-27 PROCEDURE — 1090000001 HH PPS REVENUE CREDIT

## 2024-02-28 PROCEDURE — 1090000001 HH PPS REVENUE CREDIT

## 2024-02-28 PROCEDURE — 1090000002 HH PPS REVENUE DEBIT

## 2024-02-29 PROBLEM — E03.9 HYPOTHYROID: Status: ACTIVE | Noted: 2024-02-29

## 2024-02-29 PROBLEM — R19.5 POSITIVE COLORECTAL CANCER SCREENING USING DNA-BASED STOOL TEST: Status: ACTIVE | Noted: 2024-02-29

## 2024-02-29 PROBLEM — R59.1 LYMPHADENOPATHY: Status: ACTIVE | Noted: 2024-02-29

## 2024-02-29 PROBLEM — B00.1 COLD SORE: Status: ACTIVE | Noted: 2024-02-29

## 2024-02-29 PROBLEM — M05.9: Status: ACTIVE | Noted: 2023-09-11

## 2024-02-29 PROBLEM — R30.0 DYSURIA: Status: ACTIVE | Noted: 2024-02-29

## 2024-02-29 PROBLEM — M19.90 OSTEOARTHRITIS: Status: ACTIVE | Noted: 2024-02-29

## 2024-02-29 PROBLEM — D84.9 IMMUNOSUPPRESSION (MULTI): Status: ACTIVE | Noted: 2024-02-29

## 2024-02-29 PROBLEM — K22.70 BARRETTS ESOPHAGUS: Status: ACTIVE | Noted: 2024-02-29

## 2024-02-29 PROBLEM — K11.3 ABSCESS OF SALIVARY GLAND: Status: ACTIVE | Noted: 2024-02-29

## 2024-02-29 PROBLEM — R73.02 IMPAIRED GLUCOSE TOLERANCE: Status: ACTIVE | Noted: 2023-10-27

## 2024-02-29 PROBLEM — G47.00 INSOMNIA: Status: ACTIVE | Noted: 2024-02-29

## 2024-02-29 PROBLEM — K61.1 PERIRECTAL ABSCESS: Status: ACTIVE | Noted: 2024-02-29

## 2024-02-29 PROBLEM — J32.9 SINUSITIS: Status: ACTIVE | Noted: 2024-02-29

## 2024-02-29 PROBLEM — T14.8XXA LOCAL INFECTION OF WOUND: Status: ACTIVE | Noted: 2024-02-29

## 2024-02-29 PROBLEM — I10 ESSENTIAL HYPERTENSION: Status: ACTIVE | Noted: 2024-02-29

## 2024-02-29 PROBLEM — F41.9 ANXIETY: Status: ACTIVE | Noted: 2024-02-29

## 2024-02-29 PROBLEM — E55.9 VITAMIN D DEFICIENCY: Status: ACTIVE | Noted: 2024-02-29

## 2024-02-29 PROBLEM — E78.00 PURE HYPERCHOLESTEROLEMIA: Status: ACTIVE | Noted: 2024-02-29

## 2024-02-29 PROBLEM — F32.A DEPRESSION: Status: ACTIVE | Noted: 2024-02-29

## 2024-02-29 PROBLEM — E66.9 OBESITY DUE TO ENERGY IMBALANCE: Status: ACTIVE | Noted: 2024-02-29

## 2024-02-29 PROBLEM — L08.9 LOCAL INFECTION OF WOUND: Status: ACTIVE | Noted: 2024-02-29

## 2024-02-29 PROCEDURE — 1090000001 HH PPS REVENUE CREDIT

## 2024-02-29 PROCEDURE — 1090000002 HH PPS REVENUE DEBIT

## 2024-03-01 PROCEDURE — 1090000002 HH PPS REVENUE DEBIT

## 2024-03-01 PROCEDURE — 1090000001 HH PPS REVENUE CREDIT

## 2024-03-02 PROCEDURE — 1090000001 HH PPS REVENUE CREDIT

## 2024-03-02 PROCEDURE — 1090000002 HH PPS REVENUE DEBIT

## 2024-03-03 PROCEDURE — 1090000002 HH PPS REVENUE DEBIT

## 2024-03-03 PROCEDURE — 1090000001 HH PPS REVENUE CREDIT

## 2024-03-04 ENCOUNTER — HOME CARE VISIT (OUTPATIENT)
Dept: HOME HEALTH SERVICES | Facility: HOME HEALTH | Age: 59
End: 2024-03-04
Payer: MEDICARE

## 2024-03-04 VITALS
OXYGEN SATURATION: 95 % | DIASTOLIC BLOOD PRESSURE: 80 MMHG | SYSTOLIC BLOOD PRESSURE: 128 MMHG | RESPIRATION RATE: 18 BRPM | HEART RATE: 93 BPM | TEMPERATURE: 98.7 F

## 2024-03-04 PROCEDURE — G0299 HHS/HOSPICE OF RN EA 15 MIN: HCPCS | Mod: HHH

## 2024-03-04 PROCEDURE — 1090000001 HH PPS REVENUE CREDIT

## 2024-03-04 PROCEDURE — 1090000002 HH PPS REVENUE DEBIT

## 2024-03-04 SDOH — ECONOMIC STABILITY: GENERAL

## 2024-03-04 ASSESSMENT — ENCOUNTER SYMPTOMS
DENIES PAIN: 1
APPETITE LEVEL: GOOD

## 2024-03-04 ASSESSMENT — ACTIVITIES OF DAILY LIVING (ADL)
AMBULATION ASSISTANCE: STAND BY ASSIST
CURRENT_FUNCTION: STAND BY ASSIST

## 2024-03-05 PROCEDURE — 1090000001 HH PPS REVENUE CREDIT

## 2024-03-05 PROCEDURE — 1090000002 HH PPS REVENUE DEBIT

## 2024-03-06 PROCEDURE — 1090000001 HH PPS REVENUE CREDIT

## 2024-03-06 PROCEDURE — 1090000002 HH PPS REVENUE DEBIT

## 2024-03-07 PROCEDURE — 1090000001 HH PPS REVENUE CREDIT

## 2024-03-07 PROCEDURE — 1090000002 HH PPS REVENUE DEBIT

## 2024-03-08 PROCEDURE — 1090000001 HH PPS REVENUE CREDIT

## 2024-03-08 PROCEDURE — 1090000002 HH PPS REVENUE DEBIT

## 2024-03-09 PROCEDURE — 1090000001 HH PPS REVENUE CREDIT

## 2024-03-09 PROCEDURE — 1090000002 HH PPS REVENUE DEBIT

## 2024-03-10 PROCEDURE — 1090000001 HH PPS REVENUE CREDIT

## 2024-03-10 PROCEDURE — 1090000002 HH PPS REVENUE DEBIT

## 2024-03-11 PROCEDURE — 1090000002 HH PPS REVENUE DEBIT

## 2024-03-11 PROCEDURE — 1090000001 HH PPS REVENUE CREDIT

## 2024-03-11 NOTE — PROGRESS NOTES
Chief Compliant:  POV    History Of Present Illness  Tracey Carranza is a 58 y.o. female with recently diagnosed rectal cancer referred to Dr. Presley.    She saw new PCP who ordered Cologuard. November 2023 Positive Cologuard.  Prior colonoscopy was approx 10 years ago. 1 polyp was removed. She lost her job and did not have health care for a while.      12/20/2023 Colonoscopy to [facility]  Impression  he terminal ileum appeared normal.  Subcentimeter polyp in the sigmoid colon was removed with cold snare  Single malignant-appearing and ulcerated mass measuring 4 cm in the mid rectum 13 cm from the anal verge, covering one third of the circumference; performed cold forceps biopsy  Small hemorrhoids  _   Pathology  A.  Rectum, biopsy:  - Moderately differentiated adenocarcinoma of colon.     B.  Sigmoid colon, polypectomy:  - Hyperplastic polyp.  12/29/2023 Laboratory:  CEA:  0.9  CBC:  WNL  CMP: WNL     01/02/2024 CT CHEST/ABD/PELVIS WITH CONTRAST  1. Equivocal wall thickening within the right lateral distal rectum measuring 5 mm. Correlate with site of patient's rectal carcinoma given the history. No stranding of the perirectal fat. No surrounding perirectal lymph node.      2. No lymphadenopathy in the chest, abdomen, or pelvis      3. Multiple pulmonary nodules demonstrated in the bilateral lung fields as seen on prior CT from 11/16/2023. Component of these pulmonary nodules are also demonstrated on remote CT from 07/12/2010. Attention on follow-up.     01/08/2024 MRI RECTUM:  IMPRESSION:  Rectal mass as detailed above is consistent with rectal neoplasm.  MRI based staging is:  MR-imaging based stage of rectal cancer is T1/2.  Based on MRI the ana stage is N0.     She is s/p Robot Assisted Laparoscopic Sigmoid Colon and Rectum Resection, Right Oophrectomy, ileostomy 2/6/24.  FINAL DIAGNOSIS   A. RIGHT OVARY OOPHORECTOMY:   -- OVARY WITH BENIGN CYST, NO EVIDENCE OF MALIGNANCY     B. RECTUM LOW ANTERIOR RESECTION:     "  -- INVASIVE ADENOCARCINOMA, MODERATELY DIFFERENTIATED, SEE SYNOPTIC REPORT AND NOTE  -- TWENTY-FIVE LYMPH NODES WITH NO EVIDENCE OF MALIGNANCY (0/25)     Note: Immunohistochemistry for MMR proteins had been performed on prior biopsy ([id number]) and showed the loss of MSH-2 and MSH-6 protein expression.    pT1,pN0    Had ileus while in hospital. Resolved conservatively.  Treated for UTI at discharge. Much better. No dysuria.    Restarted Methotrexate 2 weeks ago for RA.    Denies any F/C, N/V, CP/SOB.  Appetite:   Energy: better  Weight: stable  Ostomy: ileostomy  Ostomy output: liquidy to thicker  Changes appliance: every 3 days.    Physical Exam  Constitutional: Well developed, awake/alert/oriented x3, no distress, alert and cooperative   Eyes: Sclera anicteric, no conjunctival inflammation, conjugate gaze   ENMT: mucous membranes moist, no apparent injury,   Head/Neck: Neck supple, no apparent injury, trachea midline, no bruits   Respiratory/Thorax: Patent airways, CTAB, normal breath sounds with good chest expansion  Cardiovascular: Regular, rate and rhythm, no murmurs, normal S1 and S2   Gastrointestinal: Nondistended, soft, non-tender. Ileostomy RUQ   Extremities: normal extremities, no edema  Neurological: alert and oriented x3, normal strength, Normal gait   Psychological: Appropriate mood and behavior   Skin: Warm and dry, no lesions, no rashes     Last Recorded Vitals  /83   Pulse 94   Temp 36.2 °C (97.2 °F)   Ht 1.651 m (5' 5\")   Wt 88.9 kg (196 lb)   BMI 32.62 kg/m²     Assessment:  T1No rectal cancer  S/p Robot Assisted Laparoscopic Sigmoid Colon and Rectum Resection, Right Oophrectomy, ileostomy 2/6/24 c/b ileus and UTI  Doing well now    Plan:  -Diet: soft diet as tolerated  -Activity: Normal activities can be resumed. No lifting greater than 10 lbs for a full 6 weeks following surgery   -Follow up with Dr. Presley for loop ileostomy closure 4/9/24 pending GGE  -Discussed surveillance " protocol for rectal cancer.  -All questions and concerns were answered. Encouraged to call with any question or concerns.      Freda Xie, APRN-CNP

## 2024-03-11 NOTE — H&P (VIEW-ONLY)
Chief Compliant:  POV    History Of Present Illness  Tracey Carranza is a 58 y.o. female with recently diagnosed rectal cancer referred to Dr. Presley.    She saw new PCP who ordered Cologuard. November 2023 Positive Cologuard.  Prior colonoscopy was approx 10 years ago. 1 polyp was removed. She lost her job and did not have health care for a while.      12/20/2023 Colonoscopy to [facility]  Impression  he terminal ileum appeared normal.  Subcentimeter polyp in the sigmoid colon was removed with cold snare  Single malignant-appearing and ulcerated mass measuring 4 cm in the mid rectum 13 cm from the anal verge, covering one third of the circumference; performed cold forceps biopsy  Small hemorrhoids  _   Pathology  A.  Rectum, biopsy:  - Moderately differentiated adenocarcinoma of colon.     B.  Sigmoid colon, polypectomy:  - Hyperplastic polyp.  12/29/2023 Laboratory:  CEA:  0.9  CBC:  WNL  CMP: WNL     01/02/2024 CT CHEST/ABD/PELVIS WITH CONTRAST  1. Equivocal wall thickening within the right lateral distal rectum measuring 5 mm. Correlate with site of patient's rectal carcinoma given the history. No stranding of the perirectal fat. No surrounding perirectal lymph node.      2. No lymphadenopathy in the chest, abdomen, or pelvis      3. Multiple pulmonary nodules demonstrated in the bilateral lung fields as seen on prior CT from 11/16/2023. Component of these pulmonary nodules are also demonstrated on remote CT from 07/12/2010. Attention on follow-up.     01/08/2024 MRI RECTUM:  IMPRESSION:  Rectal mass as detailed above is consistent with rectal neoplasm.  MRI based staging is:  MR-imaging based stage of rectal cancer is T1/2.  Based on MRI the ana stage is N0.     She is s/p Robot Assisted Laparoscopic Sigmoid Colon and Rectum Resection, Right Oophrectomy, ileostomy 2/6/24.  FINAL DIAGNOSIS   A. RIGHT OVARY OOPHORECTOMY:   -- OVARY WITH BENIGN CYST, NO EVIDENCE OF MALIGNANCY     B. RECTUM LOW ANTERIOR RESECTION:     "  -- INVASIVE ADENOCARCINOMA, MODERATELY DIFFERENTIATED, SEE SYNOPTIC REPORT AND NOTE  -- TWENTY-FIVE LYMPH NODES WITH NO EVIDENCE OF MALIGNANCY (0/25)     Note: Immunohistochemistry for MMR proteins had been performed on prior biopsy ([id number]) and showed the loss of MSH-2 and MSH-6 protein expression.    pT1,pN0    Had ileus while in hospital. Resolved conservatively.  Treated for UTI at discharge. Much better. No dysuria.    Restarted Methotrexate 2 weeks ago for RA.    Denies any F/C, N/V, CP/SOB.  Appetite:   Energy: better  Weight: stable  Ostomy: ileostomy  Ostomy output: liquidy to thicker  Changes appliance: every 3 days.    Physical Exam  Constitutional: Well developed, awake/alert/oriented x3, no distress, alert and cooperative   Eyes: Sclera anicteric, no conjunctival inflammation, conjugate gaze   ENMT: mucous membranes moist, no apparent injury,   Head/Neck: Neck supple, no apparent injury, trachea midline, no bruits   Respiratory/Thorax: Patent airways, CTAB, normal breath sounds with good chest expansion  Cardiovascular: Regular, rate and rhythm, no murmurs, normal S1 and S2   Gastrointestinal: Nondistended, soft, non-tender. Ileostomy RUQ   Extremities: normal extremities, no edema  Neurological: alert and oriented x3, normal strength, Normal gait   Psychological: Appropriate mood and behavior   Skin: Warm and dry, no lesions, no rashes     Last Recorded Vitals  /83   Pulse 94   Temp 36.2 °C (97.2 °F)   Ht 1.651 m (5' 5\")   Wt 88.9 kg (196 lb)   BMI 32.62 kg/m²     Assessment:  T1No rectal cancer  S/p Robot Assisted Laparoscopic Sigmoid Colon and Rectum Resection, Right Oophrectomy, ileostomy 2/6/24 c/b ileus and UTI  Doing well now    Plan:  -Diet: soft diet as tolerated  -Activity: Normal activities can be resumed. No lifting greater than 10 lbs for a full 6 weeks following surgery   -Follow up with Dr. Presley for loop ileostomy closure 4/9/24 pending GGE  -Discussed surveillance " protocol for rectal cancer.  -All questions and concerns were answered. Encouraged to call with any question or concerns.      Freda Xie, APRN-CNP

## 2024-03-12 PROCEDURE — 1090000002 HH PPS REVENUE DEBIT

## 2024-03-12 PROCEDURE — 1090000001 HH PPS REVENUE CREDIT

## 2024-03-13 ENCOUNTER — OFFICE VISIT (OUTPATIENT)
Dept: SURGERY | Facility: CLINIC | Age: 59
End: 2024-03-13
Payer: MEDICARE

## 2024-03-13 VITALS
SYSTOLIC BLOOD PRESSURE: 143 MMHG | HEIGHT: 65 IN | BODY MASS INDEX: 32.65 KG/M2 | TEMPERATURE: 97.2 F | DIASTOLIC BLOOD PRESSURE: 83 MMHG | HEART RATE: 94 BPM | WEIGHT: 196 LBS

## 2024-03-13 DIAGNOSIS — Z93.2 ILEOSTOMY IN PLACE (MULTI): ICD-10-CM

## 2024-03-13 DIAGNOSIS — C20 RECTAL CANCER (MULTI): Primary | ICD-10-CM

## 2024-03-13 DIAGNOSIS — C20 RECTAL ADENOCARCINOMA (MULTI): ICD-10-CM

## 2024-03-13 PROCEDURE — 3008F BODY MASS INDEX DOCD: CPT | Performed by: NURSE PRACTITIONER

## 2024-03-13 PROCEDURE — 3077F SYST BP >= 140 MM HG: CPT | Performed by: NURSE PRACTITIONER

## 2024-03-13 PROCEDURE — 3079F DIAST BP 80-89 MM HG: CPT | Performed by: NURSE PRACTITIONER

## 2024-03-13 PROCEDURE — 1090000001 HH PPS REVENUE CREDIT

## 2024-03-13 PROCEDURE — 99024 POSTOP FOLLOW-UP VISIT: CPT | Performed by: NURSE PRACTITIONER

## 2024-03-13 PROCEDURE — 1090000002 HH PPS REVENUE DEBIT

## 2024-03-13 ASSESSMENT — PAIN SCALES - GENERAL: PAINLEVEL: 0-NO PAIN

## 2024-03-14 ENCOUNTER — HOME CARE VISIT (OUTPATIENT)
Dept: HOME HEALTH SERVICES | Facility: HOME HEALTH | Age: 59
End: 2024-03-14
Payer: MEDICARE

## 2024-03-14 VITALS
RESPIRATION RATE: 18 BRPM | HEART RATE: 78 BPM | TEMPERATURE: 97.5 F | DIASTOLIC BLOOD PRESSURE: 62 MMHG | OXYGEN SATURATION: 98 % | SYSTOLIC BLOOD PRESSURE: 115 MMHG

## 2024-03-14 PROCEDURE — G0299 HHS/HOSPICE OF RN EA 15 MIN: HCPCS | Mod: HHH

## 2024-03-14 PROCEDURE — 1090000002 HH PPS REVENUE DEBIT

## 2024-03-14 PROCEDURE — 1090000001 HH PPS REVENUE CREDIT

## 2024-03-14 RX ORDER — HEPARIN SODIUM 5000 [USP'U]/ML
5000 INJECTION, SOLUTION INTRAVENOUS; SUBCUTANEOUS ONCE
Status: CANCELLED | OUTPATIENT
Start: 2024-03-14 | End: 2024-03-14

## 2024-03-14 RX ORDER — METRONIDAZOLE 500 MG/100ML
500 INJECTION, SOLUTION INTRAVENOUS ONCE
Status: CANCELLED | OUTPATIENT
Start: 2024-03-14 | End: 2024-03-14

## 2024-03-14 RX ORDER — SODIUM CHLORIDE 9 MG/ML
10 INJECTION, SOLUTION INTRAVENOUS CONTINUOUS
Status: CANCELLED | OUTPATIENT
Start: 2024-03-14

## 2024-03-14 RX ORDER — CEFAZOLIN SODIUM 2 G/100ML
2 INJECTION, SOLUTION INTRAVENOUS ONCE
Status: CANCELLED | OUTPATIENT
Start: 2024-03-14 | End: 2024-03-14

## 2024-03-14 SDOH — ECONOMIC STABILITY: GENERAL

## 2024-03-14 ASSESSMENT — ENCOUNTER SYMPTOMS
APPETITE LEVEL: GOOD
DENIES PAIN: 1

## 2024-03-14 ASSESSMENT — ACTIVITIES OF DAILY LIVING (ADL)
AMBULATION ASSISTANCE: STAND BY ASSIST
CURRENT_FUNCTION: STAND BY ASSIST

## 2024-03-15 PROCEDURE — 1090000001 HH PPS REVENUE CREDIT

## 2024-03-15 PROCEDURE — 1090000002 HH PPS REVENUE DEBIT

## 2024-03-16 PROCEDURE — 1090000002 HH PPS REVENUE DEBIT

## 2024-03-16 PROCEDURE — 1090000001 HH PPS REVENUE CREDIT

## 2024-03-17 PROCEDURE — 1090000001 HH PPS REVENUE CREDIT

## 2024-03-17 PROCEDURE — 1090000002 HH PPS REVENUE DEBIT

## 2024-03-18 PROCEDURE — 1090000002 HH PPS REVENUE DEBIT

## 2024-03-18 PROCEDURE — 1090000001 HH PPS REVENUE CREDIT

## 2024-03-19 ENCOUNTER — HOME CARE VISIT (OUTPATIENT)
Dept: HOME HEALTH SERVICES | Facility: HOME HEALTH | Age: 59
End: 2024-03-19
Payer: MEDICARE

## 2024-03-19 VITALS
RESPIRATION RATE: 18 BRPM | TEMPERATURE: 97.7 F | OXYGEN SATURATION: 100 % | DIASTOLIC BLOOD PRESSURE: 70 MMHG | HEART RATE: 86 BPM | SYSTOLIC BLOOD PRESSURE: 120 MMHG

## 2024-03-19 PROCEDURE — 0023 HH SOC

## 2024-03-19 PROCEDURE — G0299 HHS/HOSPICE OF RN EA 15 MIN: HCPCS | Mod: HHH

## 2024-03-19 PROCEDURE — 1090000002 HH PPS REVENUE DEBIT

## 2024-03-19 PROCEDURE — 1090000001 HH PPS REVENUE CREDIT

## 2024-03-19 PROCEDURE — 1090000003 HH PPS REVENUE ADJ

## 2024-03-19 ASSESSMENT — ACTIVITIES OF DAILY LIVING (ADL)
HOME_HEALTH_OASIS: 00
OASIS_M1830: 00

## 2024-03-19 ASSESSMENT — ENCOUNTER SYMPTOMS: DENIES PAIN: 1

## 2024-03-27 ENCOUNTER — TELEMEDICINE CLINICAL SUPPORT (OUTPATIENT)
Dept: PREADMISSION TESTING | Facility: HOSPITAL | Age: 59
End: 2024-03-27
Payer: MEDICARE

## 2024-03-27 DIAGNOSIS — C20 RECTAL CANCER (MULTI): ICD-10-CM

## 2024-03-27 RX ORDER — ACETAMINOPHEN 325 MG/1
650 TABLET ORAL EVERY 6 HOURS PRN
COMMUNITY

## 2024-04-01 ENCOUNTER — PRE-ADMISSION TESTING (OUTPATIENT)
Dept: PREADMISSION TESTING | Facility: HOSPITAL | Age: 59
End: 2024-04-01
Payer: MEDICARE

## 2024-04-01 ENCOUNTER — LAB (OUTPATIENT)
Dept: LAB | Facility: LAB | Age: 59
End: 2024-04-01
Payer: MEDICARE

## 2024-04-01 VITALS
SYSTOLIC BLOOD PRESSURE: 129 MMHG | TEMPERATURE: 98.2 F | WEIGHT: 191.8 LBS | DIASTOLIC BLOOD PRESSURE: 59 MMHG | HEART RATE: 98 BPM | HEIGHT: 65 IN | OXYGEN SATURATION: 97 % | RESPIRATION RATE: 18 BRPM | BODY MASS INDEX: 31.96 KG/M2

## 2024-04-01 DIAGNOSIS — Z00.00 HEALTHCARE MAINTENANCE: ICD-10-CM

## 2024-04-01 DIAGNOSIS — Z13.21 ENCOUNTER FOR VITAMIN DEFICIENCY SCREENING: ICD-10-CM

## 2024-04-01 DIAGNOSIS — Z01.818 PREOP TESTING: ICD-10-CM

## 2024-04-01 DIAGNOSIS — C20 RECTAL CANCER (MULTI): ICD-10-CM

## 2024-04-01 DIAGNOSIS — Z13.6 SCREENING FOR CARDIOVASCULAR CONDITION: ICD-10-CM

## 2024-04-01 DIAGNOSIS — E03.9 HYPOTHYROIDISM, UNSPECIFIED TYPE: ICD-10-CM

## 2024-04-01 DIAGNOSIS — Z01.818 PREOP TESTING: Primary | ICD-10-CM

## 2024-04-01 LAB
ABO GROUP (TYPE) IN BLOOD: NORMAL
ANION GAP SERPL CALC-SCNC: 14 MMOL/L (ref 10–20)
ANTIBODY SCREEN: NORMAL
BASOPHILS # BLD AUTO: 0.09 X10*3/UL (ref 0–0.1)
BASOPHILS NFR BLD AUTO: 0.9 %
BUN SERPL-MCNC: 11 MG/DL (ref 6–23)
CALCIUM SERPL-MCNC: 9.9 MG/DL (ref 8.6–10.3)
CHLORIDE SERPL-SCNC: 104 MMOL/L (ref 98–107)
CHOLEST SERPL-MCNC: 136 MG/DL (ref 0–199)
CHOLESTEROL/HDL RATIO: 3
CO2 SERPL-SCNC: 21 MMOL/L (ref 21–32)
CREAT SERPL-MCNC: 0.92 MG/DL (ref 0.5–1.05)
EGFRCR SERPLBLD CKD-EPI 2021: 72 ML/MIN/1.73M*2
EOSINOPHIL # BLD AUTO: 0.19 X10*3/UL (ref 0–0.7)
EOSINOPHIL NFR BLD AUTO: 1.9 %
ERYTHROCYTE [DISTWIDTH] IN BLOOD BY AUTOMATED COUNT: 14.6 % (ref 11.5–14.5)
GLUCOSE SERPL-MCNC: 77 MG/DL (ref 74–99)
HCT VFR BLD AUTO: 42.2 % (ref 36–46)
HDLC SERPL-MCNC: 45.1 MG/DL
HGB BLD-MCNC: 13.8 G/DL (ref 12–16)
IMM GRANULOCYTES # BLD AUTO: 0.04 X10*3/UL (ref 0–0.7)
IMM GRANULOCYTES NFR BLD AUTO: 0.4 % (ref 0–0.9)
LDLC SERPL CALC-MCNC: 59 MG/DL
LYMPHOCYTES # BLD AUTO: 2.25 X10*3/UL (ref 1.2–4.8)
LYMPHOCYTES NFR BLD AUTO: 22.8 %
MCH RBC QN AUTO: 30.5 PG (ref 26–34)
MCHC RBC AUTO-ENTMCNC: 32.7 G/DL (ref 32–36)
MCV RBC AUTO: 93 FL (ref 80–100)
MONOCYTES # BLD AUTO: 1.17 X10*3/UL (ref 0.1–1)
MONOCYTES NFR BLD AUTO: 11.8 %
NEUTROPHILS # BLD AUTO: 6.15 X10*3/UL (ref 1.2–7.7)
NEUTROPHILS NFR BLD AUTO: 62.2 %
NON HDL CHOLESTEROL: 91 MG/DL (ref 0–149)
NRBC BLD-RTO: 0 /100 WBCS (ref 0–0)
PLATELET # BLD AUTO: 407 X10*3/UL (ref 150–450)
POTASSIUM SERPL-SCNC: 4.6 MMOL/L (ref 3.5–5.3)
RBC # BLD AUTO: 4.53 X10*6/UL (ref 4–5.2)
RH FACTOR (ANTIGEN D): NORMAL
SODIUM SERPL-SCNC: 134 MMOL/L (ref 136–145)
T4 FREE SERPL-MCNC: 1.16 NG/DL (ref 0.61–1.12)
TRIGL SERPL-MCNC: 160 MG/DL (ref 0–149)
VLDL: 32 MG/DL (ref 0–40)
WBC # BLD AUTO: 9.9 X10*3/UL (ref 4.4–11.3)

## 2024-04-01 PROCEDURE — 86850 RBC ANTIBODY SCREEN: CPT

## 2024-04-01 PROCEDURE — 80061 LIPID PANEL: CPT

## 2024-04-01 PROCEDURE — 87081 CULTURE SCREEN ONLY: CPT | Mod: AHULAB | Performed by: PHYSICIAN ASSISTANT

## 2024-04-01 PROCEDURE — 82652 VIT D 1 25-DIHYDROXY: CPT

## 2024-04-01 PROCEDURE — 36415 COLL VENOUS BLD VENIPUNCTURE: CPT

## 2024-04-01 PROCEDURE — 85025 COMPLETE CBC W/AUTO DIFF WBC: CPT

## 2024-04-01 PROCEDURE — 80048 BASIC METABOLIC PNL TOTAL CA: CPT

## 2024-04-01 PROCEDURE — 84439 ASSAY OF FREE THYROXINE: CPT

## 2024-04-01 PROCEDURE — 86900 BLOOD TYPING SEROLOGIC ABO: CPT

## 2024-04-01 PROCEDURE — 86901 BLOOD TYPING SEROLOGIC RH(D): CPT

## 2024-04-01 PROCEDURE — 99214 OFFICE O/P EST MOD 30 MIN: CPT | Performed by: PHYSICIAN ASSISTANT

## 2024-04-01 RX ORDER — CHLORHEXIDINE GLUCONATE ORAL RINSE 1.2 MG/ML
SOLUTION DENTAL
Qty: 475 ML | Refills: 0 | Status: SHIPPED | OUTPATIENT
Start: 2024-04-01 | End: 2024-04-10 | Stop reason: HOSPADM

## 2024-04-01 ASSESSMENT — ENCOUNTER SYMPTOMS
COUGH: 0
NECK STIFFNESS: 0
MYALGIAS: 0
BLOOD IN STOOL: 0
EXCESSIVE BLEEDING: 0
CONFUSION: 0
PALPITATIONS: 0
VISUAL CHANGE: 0
DIFFICULTY URINATING: 0
NUMBNESS: 0
HEMOPTYSIS: 0
EYE PAIN: 0
NECK PAIN: 0
LIGHT-HEADEDNESS: 0
CONSTIPATION: 0
BRUISES/BLEEDS EASILY: 0
LIMITED RANGE OF MOTION: 0
FEVER: 0
SINUS CONGESTION: 0
TROUBLE SWALLOWING: 0
DYSPNEA WITH EXERTION: 0
WOUND: 0
DIARRHEA: 0
WHEEZING: 0
ABDOMINAL DISTENTION: 0
NAUSEA: 0
ARTHRALGIAS: 1
ABDOMINAL PAIN: 0
VOMITING: 0
RHINORRHEA: 0
SKIN CHANGES: 0
DYSPNEA AT REST: 0
DOUBLE VISION: 0
SHORTNESS OF BREATH: 0
WEAKNESS: 0
CHILLS: 0
UNEXPECTED WEIGHT CHANGE: 0
DYSURIA: 0
EYE DISCHARGE: 0

## 2024-04-01 NOTE — PREPROCEDURE INSTRUCTIONS
Medication List            Accurate as of April 1, 2024  1:16 PM. Always use your most recent med list.                acetaminophen 325 mg tablet  Commonly known as: Tylenol  Medication Adjustments for Surgery: Take morning of surgery with sip of water, no other fluids     amitriptyline 25 mg tablet  Commonly known as: Elavil  Medication Adjustments for Surgery: Take morning of surgery with sip of water, no other fluids     atorvastatin 40 mg tablet  Commonly known as: Lipitor  Take 1 tablet (40 mg) by mouth once daily.  Medication Adjustments for Surgery: Take morning of surgery with sip of water, no other fluids     chlorhexidine 0.12 % solution  Commonly known as: Peridex  Swish for 30 seconds and spit 15mL of solution the night before and morning of surgery     cholecalciferol 50 MCG (2000 UT) tablet  Commonly known as: Vitamin D3  Take 2 tablets (100 mcg) by mouth once daily.  Medication Adjustments for Surgery: Continue until night before surgery     folic acid 1 mg tablet  Commonly known as: Folvite  Medication Adjustments for Surgery: Continue until night before surgery     levothyroxine 88 mcg tablet  Commonly known as: Synthroid, Levoxyl  Take 1 tablet (88 mcg) by mouth once daily in the morning. Take before meals.  Medication Adjustments for Surgery: Take morning of surgery with sip of water, no other fluids     methotrexate 2.5 mg tablet  Commonly known as: Trexall  Medication Adjustments for Surgery: Take morning of surgery with sip of water, no other fluids     pantoprazole 40 mg EC tablet  Commonly known as: Protonix  Take 1 tablet (40 mg) by mouth once daily.  Medication Adjustments for Surgery: Take morning of surgery with sip of water, no other fluids                            **Concerning above medication instructions, if medication is normally taken at night, continue normal schedule.**  **DO NOT TAKE NIGHT PRIOR AND MORNING OF SURGERY**    CONTACT SURGEON'S OFFICE IF YOU DEVELOP:  * Fever =  100.4 F   * New respiratory symptoms (e.g. cough, shortness of breath, respiratory distress, sore throat)  * Recent loss of taste or smell  *Flu like symptoms such as headache, fatigue or gastrointestinal symptoms  * You develop any open sores, shingles, burning or painful urination   AND/OR:  * You no longer wish to have the surgery.  * Any other personal circumstances change that may lead to the need to cancel or defer this surgery.  *You were admitted to any hospital within one week of your planned procedure.    SMOKING:  *Quitting smoking can make a huge difference to your health and recovery from surgery.    *If you need help with quitting, call 0-363-QUIT-NOW.    THE DAY BEFORE SURGERY:  *If given other perioperative dietary instructions from Dr. Presley's office, please follow those instructions. Otherwise:  *Do not eat any food after midnight the night before surgery.   *You are permitted to drink clear liquids (i.e. water, black coffee (no milk or cream), tea, apple juice and electrolyte drinks (gatorade)) up to 10 ounces, up to 2 hours before your arrival time.  *You may chew gum until 2 hours before your surgery    SURGICAL TIME  *You will be contacted between 2 p.m. and 6 p.m. the business day before your surgery with your arrival time.  *If you haven't received a call by 6pm, call [phone].  *Scheduled surgery times may change and you will be notified if this occurs-check your personal voicemail for any updates.    ON THE MORNING OF SURGERY:  *Wear comfortable, loose fitting clothing.   *Do not use moisturizers, creams, lotions or perfume.  *All jewelry and valuables should be left at home.  *Prosthetic devices such as contact lenses, hearing aids, dentures, eyelash extensions, hairpins and body piercing must be removed before surgery.    BRING WITH YOU:  *Photo ID and insurance card  *Current list of medicines and allergies  *Pacemaker/Defibrillator/Heart stent cards  *CPAP machine and  mask  *Slings/splints/crutches  *Copy of your complete Advanced Directive/DHPOA-if applicable  *Neurostimulator implant remote    PARKING AND ARRIVAL:  *Check in at the Main Entrance desk and let them know you are here for surgery.  *You will be directed to the 2nd floor surgical waiting area.    AFTER OUTPATIENT SURGERY:  *A responsible adult MUST accompany you at the time of discharge and stay with you for 24 hours after your surgery.  *You may NOT drive yourself home after surgery.  *You may use a taxi or ride sharing service ([employer], [employer]) to return home ONLY if you are accompanied by a friend or family member.  *Instructions for resuming your medications will be provided by your surgeon.

## 2024-04-01 NOTE — CPM/PAT H&P
[facility]/PAT Evaluation       Name: Tracey Carranza (Tracey Carranza)  /Age: 1965/58 y.o.       Date of Consult: 24    Referring Provider: Dr. Presley    Surgery, Date, and Length: Ileostomy Closure , 24, 90MIN    Tracey Carranza is a 58 year-old female who presents to the [facility] for perioperative risk assessment prior to surgery.    Patient presents with a primary diagnosis of rectal cancer.  Pt is s/p laparoscopic sigmoid colon resection 24.  Ileostomy is functioning well in RUQ and pt denies any issues with this.  No f/c/n/v.  Pt wishes to proceed with reversal as planned.      This note was created in part upon personal review of patient's medical records.      Patient is scheduled to have leostomy Closure      Pt denies any past history of anesthetic complications such as PONV, awareness, prolonged sedation, dental damage, aspiration, cardiac arrest, difficult intubation, difficult I.V. access or unexpected hospital admissions.  NO malignant hyperthermia and or pseudocholinesterase deficiency.  No history of blood transfusions     The patient is not a [employer] and will accept blood and blood products if medically indicated.   Type and screen sent.     Past Medical History:   Diagnosis Date    Angulo esophagus     HLD (hyperlipidemia)     Hypothyroidism     Rectal adenocarcinoma (CMS/HCC)     Rheumatoid arthritis (CMS/HCC)     Warthin tumor        Past Surgical History:   Procedure Laterality Date    BACK SURGERY      Metronic device in back then removed 17 months later    CERVICAL SPINE SURGERY      COLON SURGERY  2024    colon and rectal resection w/ ileostomy, right oophrectomy    MOUTH SURGERY      x2    NASAL SEPTUM SURGERY      PLANTAR FASCIA SURGERY Left     SALIVARY GLAND SURGERY Left     Left parotidectomy with facial nerve  dissection and preservation    SHOULDER SURGERY Right        Patient Sexual activity questions deferred to the physician.    Family History    Problem Relation Name Age of Onset    Rheum arthritis Mother      Dementia Mother      Heart failure Father      Cirrhosis Father          no ETOH    Rheum arthritis Sister      Breast cancer Mother's Sister      Stomach cancer Father's Brother      Stomach cancer Maternal Grandfather       Social History     Socioeconomic History    Marital status: Single     Spouse name: Not on file    Number of children: Not on file    Years of education: Not on file    Highest education level: Not on file   Occupational History    Not on file   Tobacco Use    Smoking status: Every Day     Packs/day: 0.25     Years: 40.00     Additional pack years: 0.00     Total pack years: 10.00     Types: Cigarettes     Passive exposure: Never    Smokeless tobacco: Never    Tobacco comments:     Actively trying to quit    Vaping Use    Vaping Use: Never used   Substance and Sexual Activity    Alcohol use: Not Currently     Comment: couple drinks/month    Drug use: Never    Sexual activity: Defer   Other Topics Concern    Not on file   Social History Narrative    Not on file     Social Determinants of Health     Financial Resource Strain: Low Risk  (2/7/2024)    Overall Financial Resource Strain (CARDIA)     Difficulty of Paying Living Expenses: Not hard at all   Food Insecurity: Not on file   Transportation Needs: No Transportation Needs (3/19/2024)    OASIS : Transportation     Lack of Transportation (Medical): No     Lack of Transportation (Non-Medical): No     Patient Unable or Declines to Respond: No   Physical Activity: Not on file   Stress: Not on file   Social Connections: Feeling Socially Integrated (3/19/2024)    OASIS : Social Isolation     Frequency of experiencing loneliness or isolation: Never   Intimate Partner Violence: Not on file   Housing Stability: Low Risk  (2/7/2024)    Housing Stability Vital Sign     Unable to Pay for Housing in the Last Year: No     Number of Places Lived in the Last Year: 1     Unstable  Housing in the Last Year: No        Allergies   Allergen Reactions    Hydroxychloroquine Hives       Current Outpatient Medications:     acetaminophen (Tylenol) 325 mg tablet, Take 2 tablets (650 mg) by mouth every 6 hours if needed for mild pain (1 - 3)., Disp: , Rfl:     amitriptyline (Elavil) 25 mg tablet, Take 1 tablet (25 mg) by mouth once daily at bedtime., Disp: , Rfl:     atorvastatin (Lipitor) 40 mg tablet, Take 1 tablet (40 mg) by mouth once daily., Disp: 90 tablet, Rfl: 1    cholecalciferol (Vitamin D3) 50 MCG (2000 UT) tablet, Take 2 tablets (100 mcg) by mouth once daily., Disp: 60 tablet, Rfl: 11    folic acid (Folvite) 1 mg tablet, Take 1 tablet (1 mg) by mouth once daily., Disp: , Rfl:     levothyroxine (Synthroid, Levoxyl) 88 mcg tablet, Take 1 tablet (88 mcg) by mouth once daily in the morning. Take before meals., Disp: 90 tablet, Rfl: 3    methotrexate (Trexall) 2.5 mg tablet, Take 1 tablet (2.5 mg total) by mouth 1 (one) time per week.  Saturdays-on hold until after 4/9/24 surgery, Disp: , Rfl:     pantoprazole (Protonix) 40 mg EC tablet, Take 1 tablet (40 mg) by mouth once daily., Disp: 90 tablet, Rfl: 1    chlorhexidine (Peridex) 0.12 % solution, Swish for 30 seconds and spit 15mL of solution the night before and morning of surgery, Disp: 475 mL, Rfl: 0    PAT ROS:   Constitutional:    no fever   no chills   no unexpected weight change  Neuro/Psych:    no numbness   no weakness   no light-headedness   no confusion  Eyes:    no discharge   no pain   no vision loss   no diplopia   no visual disturbance   use of corrective lenses  Ears:    no ear pain   no hearing loss   no tinnitus  Nose:    no nasal discharge   no sinus congestion   no epistaxis  Mouth:    no dental issues   no mouth pain   no oral bleeding   no mouth lesions  Throat:    no throat pain   no dysphagia  Neck:    no neck pain   no neck stiffness  Cardio:    Functional 4 Mets. Patient denies SOB walking up 2 flights of stairs    Caring for mother in nursing home (pushing wheelchair); cooking, cleaning, grocery shopping; taking care of dogs   no chest pain   no palpitations   no peripheral edema   no dyspnea   no KNOWLES  Respiratory:    no cough   no wheezing   no hemoptysis   no shortness of breath  Endocrine:    no cold intolerance   no heat intolerance  GI:    RUQ ileostomy   no abdominal distention   no abdominal pain   no constipation   no diarrhea   no nausea   no vomiting   no blood in stool  :    no difficulty urinating   no dysuria   no oliguria  Musculoskeletal:    arthralgias (b/l shoulders)   no myalgias   no decreased ROM  Hematologic:    does not bruise/bleed easily   no excessive bleeding   no history of blood transfusion   no blood clots  Skin:   LUQ lap incision site healing well   no skin changes   no sores/wound   no rash      Physical Exam  Constitutional:       General: She is not in acute distress.     Appearance: Normal appearance. She is obese. She is not ill-appearing, toxic-appearing or diaphoretic.   HENT:      Head: Normocephalic and atraumatic.      Nose: Nose normal. No rhinorrhea.      Mouth/Throat:      Pharynx: No oropharyngeal exudate.   Eyes:      Extraocular Movements: Extraocular movements intact.      Conjunctiva/sclera: Conjunctivae normal.   Cardiovascular:      Rate and Rhythm: Normal rate and regular rhythm.      Heart sounds: No murmur heard.     No friction rub. No gallop.      Comments: Functional 4 Mets. Patient denies SOB walking up 2 flights of stairs     Pulmonary:      Effort: Pulmonary effort is normal. No respiratory distress.      Breath sounds: Normal breath sounds. No stridor. No wheezing or rhonchi.   Abdominal:      General: Bowel sounds are normal. There is no distension.      Palpations: Abdomen is soft. There is no mass.      Tenderness: There is no abdominal tenderness. There is no guarding or rebound.      Hernia: No hernia is present.      Comments: RUQ ileostomy in place and  "functioning; stoma is pink and soft; LUQ lap incision site clean, dry, intact and healing well   Musculoskeletal:         General: No swelling, tenderness, deformity or signs of injury. Normal range of motion.      Cervical back: Normal range of motion and neck supple. No rigidity or tenderness.   Skin:     General: Skin is warm and dry.      Coloration: Skin is not jaundiced or pale.      Findings: No bruising, erythema, lesion or rash.   Neurological:      General: No focal deficit present.      Mental Status: She is alert and oriented to person, place, and time.      Cranial Nerves: No cranial nerve deficit.      Sensory: No sensory deficit.      Motor: No weakness.      Coordination: Coordination normal.   Psychiatric:         Mood and Affect: Mood normal.         Behavior: Behavior normal.          PAT AIRWAY:   Airway:     Mallampati::  II    Neck ROM::  Full   No broken teeth, no dentures and no missing teeth          Visit Vitals  /59   Pulse 98   Temp 36.8 °C (98.2 °F)   Resp 18   Ht 1.65 m (5' 4.96\")   Wt 87 kg (191 lb 12.8 oz)   SpO2 97%   BMI 31.96 kg/m²   OB Status Postmenopausal   Smoking Status Every Day   BSA 2 m²        LABS:  Lab Results   Component Value Date    GLUCOSE 102 (H) 02/21/2024    CALCIUM 10.0 02/21/2024     02/21/2024    K 4.9 02/21/2024    CO2 27 02/21/2024     02/21/2024    BUN 8 02/21/2024    CREATININE 0.83 02/21/2024      Lab Results   Component Value Date    ALT 18 02/21/2024    AST 11 02/21/2024    ALKPHOS 91 02/21/2024    BILITOT 0.3 02/21/2024      Lab Results   Component Value Date    WBC 14.3 (H) 02/13/2024    HGB 11.7 (L) 02/13/2024    HCT 37.2 02/13/2024    MCV 96 02/13/2024     02/13/2024      Lab Results   Component Value Date    WBC 9.9 04/01/2024    HGB 13.8 04/01/2024    HCT 42.2 04/01/2024    MCV 93 04/01/2024     04/01/2024      EKG 2/9/24  Normal sinus rhythm  Left axis deviation  Abnormal ECG  No previous ECGs available  Confirmed " by Ray Fitzgerald ([id number]) on 2/9/2024 4:25:22 PM    CT cardiac score 12/13/23  FINDINGS:  The score and distribution of calcium in the coronary arteries is as  follows:      LM 0,  LAD 50.47,  LCx 0,  RCA 4.77,      Total 55.24    Assessment and Plan:   Patient is a 58-year-old female scheduled for a leostomy Closure with Dr. Presley on 4/9/24.    Patient has no active cardiac symptoms.   Patient denies any chest pain, tightness, heaviness, pressure, radiating pain, palpitations, irregular heartbeats, lightheadedness, cough, congestion, shortness of breath, KNOWLES, PND, near syncope, weight loss or gain.     RCRI  0  , 3.9 % Risk of MACE    Cardiac:  HLD - cont statin on dos     Pulmonary:  Nicotine dependence - Patient has history of nicotine dependency. Smoking cessation education was provided to the patient.Cessation encouraged. - Physiological and physical aspects of tobacco addiction as well as strategies for quitting were discussed. - Counseling was given focusing on the harmful effects of this addiction especially given the patient's medical condition(s) which will be worsened because of the chemicals in tobacco     GI:  GERD - cont pantoprazole on dos    Endocrine:  Hypothyroidism - cont levothyroxine on dos     Rheum:  RA - cont methotrexate on dos (although pt states she has been holding)    Hematology:  Anemia  2/21/24 H/H 11.7/37.2%  4/1/24 H/H 13.8/42.2%    Leukocytosis  2/21/24 WBC 14.3  4/1/24 WBC 9.9    Patient instructed to ambulate as soon as possible postoperatively to decrease thromboembolic risk.   Initiate mechanical DVT prophylaxis as soon as possible and initiate chemical prophylaxis when deemed safe from a bleeding standpoint post surgery.     LABS: CBC, T&S, MRSA ordered. CMP reviewed from 2/21/24 and unremarkable; no need to repeat.  Lab results from today unremarkable showing anemia and leukocytosis have resolved.     Followup: MRSA pending    STOP BANG: >50 = 1    Caprini: 6    Risk  assessment complete.  Patient is scheduled for a intermediate surgical risk procedure.        Preoperative medication instructions were provided and reviewed with the patient.  Any additional testing or evaluation was explained to the patient.  Nothing by mouth instructions were discussed and patient's questions were answered prior to conclusion to this encounter.  Patient verbalized understanding of preoperative instructions given in preadmission testing; discharge instructions available in EMR.    This note was dictated by a speech recognition.  Minor errors may have been detected in a speech recognition.

## 2024-04-02 ENCOUNTER — HOSPITAL ENCOUNTER (OUTPATIENT)
Dept: RADIOLOGY | Facility: HOSPITAL | Age: 59
Discharge: HOME | End: 2024-04-02
Payer: MEDICARE

## 2024-04-02 DIAGNOSIS — C20 RECTAL ADENOCARCINOMA (MULTI): ICD-10-CM

## 2024-04-02 PROCEDURE — 74270 X-RAY XM COLON 1CNTRST STD: CPT

## 2024-04-02 PROCEDURE — 74270 X-RAY XM COLON 1CNTRST STD: CPT | Performed by: RADIOLOGY

## 2024-04-02 PROCEDURE — 2550000001 HC RX 255 CONTRASTS: Performed by: COLON & RECTAL SURGERY

## 2024-04-02 RX ADMIN — IOHEXOL 800 ML: 350 INJECTION, SOLUTION INTRAVENOUS at 11:21

## 2024-04-03 ENCOUNTER — TELEPHONE (OUTPATIENT)
Dept: PRIMARY CARE | Facility: CLINIC | Age: 59
End: 2024-04-03
Payer: MEDICARE

## 2024-04-03 ENCOUNTER — APPOINTMENT (OUTPATIENT)
Dept: UROLOGY | Facility: HOSPITAL | Age: 59
End: 2024-04-03
Payer: MEDICARE

## 2024-04-03 LAB
1,25(OH)2D3 SERPL-MCNC: 40.3 PG/ML (ref 19.9–79.3)
STAPHYLOCOCCUS SPEC CULT: NORMAL

## 2024-04-04 ENCOUNTER — TELEMEDICINE (OUTPATIENT)
Dept: PRIMARY CARE | Facility: CLINIC | Age: 59
End: 2024-04-04
Payer: MEDICARE

## 2024-04-04 DIAGNOSIS — E03.8 OTHER SPECIFIED HYPOTHYROIDISM: Primary | ICD-10-CM

## 2024-04-04 DIAGNOSIS — C20 RECTAL ADENOCARCINOMA (MULTI): ICD-10-CM

## 2024-04-04 DIAGNOSIS — K22.70 BARRETT'S ESOPHAGUS WITHOUT DYSPLASIA: ICD-10-CM

## 2024-04-04 DIAGNOSIS — E55.9 VITAMIN D DEFICIENCY: ICD-10-CM

## 2024-04-04 DIAGNOSIS — F51.02 ADJUSTMENT INSOMNIA: ICD-10-CM

## 2024-04-04 DIAGNOSIS — F32.0 CURRENT MILD EPISODE OF MAJOR DEPRESSIVE DISORDER WITHOUT PRIOR EPISODE (CMS-HCC): ICD-10-CM

## 2024-04-04 PROCEDURE — 99443 PR PHYS/QHP TELEPHONE EVALUATION 21-30 MIN: CPT | Performed by: INTERNAL MEDICINE

## 2024-04-04 PROCEDURE — 3008F BODY MASS INDEX DOCD: CPT | Performed by: INTERNAL MEDICINE

## 2024-04-04 RX ORDER — MIRTAZAPINE 7.5 MG/1
7.5 TABLET, FILM COATED ORAL NIGHTLY
Qty: 30 TABLET | Refills: 2 | Status: SHIPPED | OUTPATIENT
Start: 2024-04-04 | End: 2024-06-11 | Stop reason: SDUPTHER

## 2024-04-04 RX ORDER — GABAPENTIN 100 MG/1
CAPSULE ORAL
Qty: 3 CAPSULE | Refills: 0 | OUTPATIENT
Start: 2024-04-04 | End: 2024-04-10 | Stop reason: HOSPADM

## 2024-04-04 NOTE — PROGRESS NOTES
Subjective   Patient ID: Tracey Carranza is a 58 y.o. female who presents for No chief complaint on file..    HPI patient is attended by phone visit for follow-up visit on history of rheumatoid arthritis, hyperlipidemia, lung nodules, coronary atherosclerosis, hypothyroidism, insomnia, nicotine dependence, adenocarcinoma of the rectum vitamin D deficiency .status post robotic assisted laparoscopic sigmoid colon and rectum resection with right oophorectomy on 2/6/2024 by Dr. Presley secondary to adenocarcinoma of the rectum, and dyslipidemia.  She is doing fairly well and denies any abdominal pain, shortness of breath, cough congestion and nausea vomiting.  She is requesting different medication for insomnia.  She underwent robotic assisted laparoscopic sigmoid colon and rectal resection with right oophorectomy on 2/6/2024 secondary to rectal adenocarcinoma.  Her hospital course got complicated postop ileus versus early partial bowel obstruction and was treated conservatively.  Laboratory studies done shows hemoglobin of 13.8, sodium of 134, cholesterol of 136, triglycerides of 160, free thyroxine level of 1.16 and other studies were unremarkable.    Review of Systems   Constitutional: Negative.    HENT: Negative.     Eyes: Negative.    Respiratory: Negative.     Cardiovascular: Negative.    Gastrointestinal: Negative.    Endocrine: Negative.    Genitourinary: Negative.    Musculoskeletal: Negative.    Skin: Negative.    Allergic/Immunologic: Negative.    Neurological: Negative.    Hematological: Negative.    Psychiatric/Behavioral:  Positive for sleep disturbance.        Objective   There were no vitals taken for this visit.    Physical Exam not done    Assessment/Plan    patient is started on Remeron 7.5 mg daily for insomnia.  She is encouraged to cut down on tobacco use.  She will continue to follow-up with surgery clinic regarding her recent diagnosis of adenocarcinoma of the rectum.  She will follow-up with  Dr. Casper in 3 months.

## 2024-04-06 ASSESSMENT — ENCOUNTER SYMPTOMS
NEUROLOGICAL NEGATIVE: 1
MUSCULOSKELETAL NEGATIVE: 1
ENDOCRINE NEGATIVE: 1
GASTROINTESTINAL NEGATIVE: 1
HEMATOLOGIC/LYMPHATIC NEGATIVE: 1
SLEEP DISTURBANCE: 1
RESPIRATORY NEGATIVE: 1
EYES NEGATIVE: 1
CONSTITUTIONAL NEGATIVE: 1
CARDIOVASCULAR NEGATIVE: 1
ALLERGIC/IMMUNOLOGIC NEGATIVE: 1

## 2024-04-08 ENCOUNTER — ANESTHESIA EVENT (OUTPATIENT)
Dept: OPERATING ROOM | Facility: HOSPITAL | Age: 59
DRG: 330 | End: 2024-04-08
Payer: MEDICARE

## 2024-04-09 ENCOUNTER — HOSPITAL ENCOUNTER (INPATIENT)
Facility: HOSPITAL | Age: 59
LOS: 1 days | Discharge: HOME | DRG: 330 | End: 2024-04-10
Attending: COLON & RECTAL SURGERY | Admitting: COLON & RECTAL SURGERY
Payer: MEDICARE

## 2024-04-09 ENCOUNTER — ANESTHESIA (OUTPATIENT)
Dept: OPERATING ROOM | Facility: HOSPITAL | Age: 59
DRG: 330 | End: 2024-04-09
Payer: MEDICARE

## 2024-04-09 DIAGNOSIS — C20 RECTAL CANCER (MULTI): Primary | ICD-10-CM

## 2024-04-09 LAB — GLUCOSE BLD MANUAL STRIP-MCNC: 78 MG/DL (ref 74–99)

## 2024-04-09 PROCEDURE — 2500000001 HC RX 250 WO HCPCS SELF ADMINISTERED DRUGS (ALT 637 FOR MEDICARE OP)

## 2024-04-09 PROCEDURE — 88304 TISSUE EXAM BY PATHOLOGIST: CPT | Performed by: PATHOLOGY

## 2024-04-09 PROCEDURE — 2500000001 HC RX 250 WO HCPCS SELF ADMINISTERED DRUGS (ALT 637 FOR MEDICARE OP): Performed by: ANESTHESIOLOGY

## 2024-04-09 PROCEDURE — 2500000001 HC RX 250 WO HCPCS SELF ADMINISTERED DRUGS (ALT 637 FOR MEDICARE OP): Performed by: STUDENT IN AN ORGANIZED HEALTH CARE EDUCATION/TRAINING PROGRAM

## 2024-04-09 PROCEDURE — 82947 ASSAY GLUCOSE BLOOD QUANT: CPT

## 2024-04-09 PROCEDURE — 99232 SBSQ HOSP IP/OBS MODERATE 35: CPT | Performed by: STUDENT IN AN ORGANIZED HEALTH CARE EDUCATION/TRAINING PROGRAM

## 2024-04-09 PROCEDURE — 7100000002 HC RECOVERY ROOM TIME - EACH INCREMENTAL 1 MINUTE: Performed by: COLON & RECTAL SURGERY

## 2024-04-09 PROCEDURE — A44620 PR CLOSE ENTEROSTOMY: Performed by: ANESTHESIOLOGY

## 2024-04-09 PROCEDURE — 1100000001 HC PRIVATE ROOM DAILY

## 2024-04-09 PROCEDURE — 3600000008 HC OR TIME - EACH INCREMENTAL 1 MINUTE - PROCEDURE LEVEL THREE: Performed by: COLON & RECTAL SURGERY

## 2024-04-09 PROCEDURE — A44620 PR CLOSE ENTEROSTOMY: Performed by: NURSE ANESTHETIST, CERTIFIED REGISTERED

## 2024-04-09 PROCEDURE — 3600000003 HC OR TIME - INITIAL BASE CHARGE - PROCEDURE LEVEL THREE: Performed by: COLON & RECTAL SURGERY

## 2024-04-09 PROCEDURE — 2720000007 HC OR 272 NO HCPCS: Performed by: COLON & RECTAL SURGERY

## 2024-04-09 PROCEDURE — 2500000004 HC RX 250 GENERAL PHARMACY W/ HCPCS (ALT 636 FOR OP/ED): Performed by: NURSE ANESTHETIST, CERTIFIED REGISTERED

## 2024-04-09 PROCEDURE — 0DBB0ZZ EXCISION OF ILEUM, OPEN APPROACH: ICD-10-PCS | Performed by: COLON & RECTAL SURGERY

## 2024-04-09 PROCEDURE — 3700000002 HC GENERAL ANESTHESIA TIME - EACH INCREMENTAL 1 MINUTE: Performed by: COLON & RECTAL SURGERY

## 2024-04-09 PROCEDURE — A4217 STERILE WATER/SALINE, 500 ML: HCPCS | Performed by: COLON & RECTAL SURGERY

## 2024-04-09 PROCEDURE — 2500000005 HC RX 250 GENERAL PHARMACY W/O HCPCS: Performed by: COLON & RECTAL SURGERY

## 2024-04-09 PROCEDURE — 2500000004 HC RX 250 GENERAL PHARMACY W/ HCPCS (ALT 636 FOR OP/ED)

## 2024-04-09 PROCEDURE — 2500000004 HC RX 250 GENERAL PHARMACY W/ HCPCS (ALT 636 FOR OP/ED): Performed by: COLON & RECTAL SURGERY

## 2024-04-09 PROCEDURE — 2500000004 HC RX 250 GENERAL PHARMACY W/ HCPCS (ALT 636 FOR OP/ED): Performed by: ANESTHESIOLOGY

## 2024-04-09 PROCEDURE — 2500000005 HC RX 250 GENERAL PHARMACY W/O HCPCS: Performed by: NURSE ANESTHETIST, CERTIFIED REGISTERED

## 2024-04-09 PROCEDURE — 7100000001 HC RECOVERY ROOM TIME - INITIAL BASE CHARGE: Performed by: COLON & RECTAL SURGERY

## 2024-04-09 PROCEDURE — 88304 TISSUE EXAM BY PATHOLOGIST: CPT | Mod: TC,AHULAB,91 | Performed by: COLON & RECTAL SURGERY

## 2024-04-09 PROCEDURE — 3700000001 HC GENERAL ANESTHESIA TIME - INITIAL BASE CHARGE: Performed by: COLON & RECTAL SURGERY

## 2024-04-09 RX ORDER — BUPIVACAINE HYDROCHLORIDE 5 MG/ML
INJECTION, SOLUTION PERINEURAL AS NEEDED
Status: DISCONTINUED | OUTPATIENT
Start: 2024-04-09 | End: 2024-04-09 | Stop reason: HOSPADM

## 2024-04-09 RX ORDER — ONDANSETRON HYDROCHLORIDE 2 MG/ML
4 INJECTION, SOLUTION INTRAVENOUS EVERY 8 HOURS PRN
Status: DISCONTINUED | OUTPATIENT
Start: 2024-04-09 | End: 2024-04-10 | Stop reason: HOSPADM

## 2024-04-09 RX ORDER — AMITRIPTYLINE HYDROCHLORIDE 25 MG/1
25 TABLET, FILM COATED ORAL NIGHTLY
Status: DISCONTINUED | OUTPATIENT
Start: 2024-04-09 | End: 2024-04-10 | Stop reason: HOSPADM

## 2024-04-09 RX ORDER — OXYCODONE HYDROCHLORIDE 5 MG/1
10 TABLET ORAL EVERY 4 HOURS PRN
Status: DISCONTINUED | OUTPATIENT
Start: 2024-04-09 | End: 2024-04-10 | Stop reason: HOSPADM

## 2024-04-09 RX ORDER — CEFAZOLIN 1 G/1
INJECTION, POWDER, FOR SOLUTION INTRAVENOUS AS NEEDED
Status: DISCONTINUED | OUTPATIENT
Start: 2024-04-09 | End: 2024-04-09

## 2024-04-09 RX ORDER — OXYCODONE HYDROCHLORIDE 5 MG/1
5 TABLET ORAL EVERY 4 HOURS PRN
Status: DISCONTINUED | OUTPATIENT
Start: 2024-04-09 | End: 2024-04-09 | Stop reason: HOSPADM

## 2024-04-09 RX ORDER — SODIUM CHLORIDE, SODIUM LACTATE, POTASSIUM CHLORIDE, CALCIUM CHLORIDE 600; 310; 30; 20 MG/100ML; MG/100ML; MG/100ML; MG/100ML
100 INJECTION, SOLUTION INTRAVENOUS CONTINUOUS
Status: DISCONTINUED | OUTPATIENT
Start: 2024-04-09 | End: 2024-04-09 | Stop reason: HOSPADM

## 2024-04-09 RX ORDER — HEPARIN SODIUM 5000 [USP'U]/ML
5000 INJECTION, SOLUTION INTRAVENOUS; SUBCUTANEOUS ONCE
Status: COMPLETED | OUTPATIENT
Start: 2024-04-09 | End: 2024-04-09

## 2024-04-09 RX ORDER — SODIUM CHLORIDE 9 MG/ML
10 INJECTION, SOLUTION INTRAVENOUS CONTINUOUS
Status: DISCONTINUED | OUTPATIENT
Start: 2024-04-09 | End: 2024-04-09

## 2024-04-09 RX ORDER — LIDOCAINE HYDROCHLORIDE 20 MG/ML
INJECTION, SOLUTION EPIDURAL; INFILTRATION; INTRACAUDAL; PERINEURAL AS NEEDED
Status: DISCONTINUED | OUTPATIENT
Start: 2024-04-09 | End: 2024-04-09

## 2024-04-09 RX ORDER — INSULIN LISPRO 100 [IU]/ML
0-5 INJECTION, SOLUTION INTRAVENOUS; SUBCUTANEOUS
Status: DISCONTINUED | OUTPATIENT
Start: 2024-04-09 | End: 2024-04-09

## 2024-04-09 RX ORDER — OXYCODONE HYDROCHLORIDE 5 MG/1
5 TABLET ORAL EVERY 4 HOURS PRN
Status: DISCONTINUED | OUTPATIENT
Start: 2024-04-09 | End: 2024-04-10 | Stop reason: HOSPADM

## 2024-04-09 RX ORDER — SODIUM CHLORIDE, SODIUM LACTATE, POTASSIUM CHLORIDE, CALCIUM CHLORIDE 600; 310; 30; 20 MG/100ML; MG/100ML; MG/100ML; MG/100ML
INJECTION, SOLUTION INTRAVENOUS CONTINUOUS PRN
Status: DISCONTINUED | OUTPATIENT
Start: 2024-04-09 | End: 2024-04-09

## 2024-04-09 RX ORDER — NALOXONE HYDROCHLORIDE 0.4 MG/ML
0.2 INJECTION, SOLUTION INTRAMUSCULAR; INTRAVENOUS; SUBCUTANEOUS EVERY 5 MIN PRN
Status: DISCONTINUED | OUTPATIENT
Start: 2024-04-09 | End: 2024-04-10 | Stop reason: HOSPADM

## 2024-04-09 RX ORDER — ONDANSETRON HYDROCHLORIDE 2 MG/ML
4 INJECTION, SOLUTION INTRAVENOUS ONCE AS NEEDED
Status: DISCONTINUED | OUTPATIENT
Start: 2024-04-09 | End: 2024-04-09 | Stop reason: HOSPADM

## 2024-04-09 RX ORDER — GABAPENTIN 300 MG/1
300 CAPSULE ORAL 3 TIMES DAILY
Status: DISCONTINUED | OUTPATIENT
Start: 2024-04-09 | End: 2024-04-10 | Stop reason: HOSPADM

## 2024-04-09 RX ORDER — MIRTAZAPINE 15 MG/1
7.5 TABLET, FILM COATED ORAL NIGHTLY
Status: DISCONTINUED | OUTPATIENT
Start: 2024-04-09 | End: 2024-04-10 | Stop reason: HOSPADM

## 2024-04-09 RX ORDER — KETOROLAC TROMETHAMINE 30 MG/ML
INJECTION, SOLUTION INTRAMUSCULAR; INTRAVENOUS AS NEEDED
Status: DISCONTINUED | OUTPATIENT
Start: 2024-04-09 | End: 2024-04-09

## 2024-04-09 RX ORDER — ATORVASTATIN CALCIUM 40 MG/1
40 TABLET, FILM COATED ORAL NIGHTLY
Status: DISCONTINUED | OUTPATIENT
Start: 2024-04-09 | End: 2024-04-10 | Stop reason: HOSPADM

## 2024-04-09 RX ORDER — SODIUM CHLORIDE 0.9 G/100ML
IRRIGANT IRRIGATION AS NEEDED
Status: DISCONTINUED | OUTPATIENT
Start: 2024-04-09 | End: 2024-04-09 | Stop reason: HOSPADM

## 2024-04-09 RX ORDER — KETOROLAC TROMETHAMINE 30 MG/ML
15 INJECTION, SOLUTION INTRAMUSCULAR; INTRAVENOUS EVERY 6 HOURS SCHEDULED
Status: DISCONTINUED | OUTPATIENT
Start: 2024-04-09 | End: 2024-04-10 | Stop reason: HOSPADM

## 2024-04-09 RX ORDER — ENOXAPARIN SODIUM 100 MG/ML
40 INJECTION SUBCUTANEOUS EVERY 24 HOURS
Status: DISCONTINUED | OUTPATIENT
Start: 2024-04-10 | End: 2024-04-10 | Stop reason: HOSPADM

## 2024-04-09 RX ORDER — DEXTROSE 50 % IN WATER (D50W) INTRAVENOUS SYRINGE
25
Status: DISCONTINUED | OUTPATIENT
Start: 2024-04-09 | End: 2024-04-10 | Stop reason: HOSPADM

## 2024-04-09 RX ORDER — MIDAZOLAM HYDROCHLORIDE 1 MG/ML
INJECTION INTRAMUSCULAR; INTRAVENOUS AS NEEDED
Status: DISCONTINUED | OUTPATIENT
Start: 2024-04-09 | End: 2024-04-09

## 2024-04-09 RX ORDER — DEXTROSE 50 % IN WATER (D50W) INTRAVENOUS SYRINGE
12.5
Status: DISCONTINUED | OUTPATIENT
Start: 2024-04-09 | End: 2024-04-10 | Stop reason: HOSPADM

## 2024-04-09 RX ORDER — FENTANYL CITRATE 50 UG/ML
INJECTION, SOLUTION INTRAMUSCULAR; INTRAVENOUS AS NEEDED
Status: DISCONTINUED | OUTPATIENT
Start: 2024-04-09 | End: 2024-04-09

## 2024-04-09 RX ORDER — CEFAZOLIN SODIUM 2 G/100ML
2 INJECTION, SOLUTION INTRAVENOUS ONCE
Status: DISCONTINUED | OUTPATIENT
Start: 2024-04-09 | End: 2024-04-09 | Stop reason: HOSPADM

## 2024-04-09 RX ORDER — SODIUM CHLORIDE, SODIUM LACTATE, POTASSIUM CHLORIDE, CALCIUM CHLORIDE 600; 310; 30; 20 MG/100ML; MG/100ML; MG/100ML; MG/100ML
40 INJECTION, SOLUTION INTRAVENOUS CONTINUOUS
Status: DISCONTINUED | OUTPATIENT
Start: 2024-04-09 | End: 2024-04-10 | Stop reason: HOSPADM

## 2024-04-09 RX ORDER — PROPOFOL 10 MG/ML
INJECTION, EMULSION INTRAVENOUS AS NEEDED
Status: DISCONTINUED | OUTPATIENT
Start: 2024-04-09 | End: 2024-04-09

## 2024-04-09 RX ORDER — ONDANSETRON HYDROCHLORIDE 2 MG/ML
INJECTION, SOLUTION INTRAVENOUS AS NEEDED
Status: DISCONTINUED | OUTPATIENT
Start: 2024-04-09 | End: 2024-04-09

## 2024-04-09 RX ORDER — LEVOTHYROXINE SODIUM 88 UG/1
88 TABLET ORAL
Status: DISCONTINUED | OUTPATIENT
Start: 2024-04-10 | End: 2024-04-10 | Stop reason: HOSPADM

## 2024-04-09 RX ORDER — HYDRALAZINE HYDROCHLORIDE 20 MG/ML
5 INJECTION INTRAMUSCULAR; INTRAVENOUS EVERY 30 MIN PRN
Status: DISCONTINUED | OUTPATIENT
Start: 2024-04-09 | End: 2024-04-09 | Stop reason: HOSPADM

## 2024-04-09 RX ORDER — ACETAMINOPHEN 325 MG/1
650 TABLET ORAL EVERY 4 HOURS
Status: DISCONTINUED | OUTPATIENT
Start: 2024-04-09 | End: 2024-04-10 | Stop reason: HOSPADM

## 2024-04-09 RX ORDER — METRONIDAZOLE 500 MG/100ML
500 INJECTION, SOLUTION INTRAVENOUS ONCE
Status: COMPLETED | OUTPATIENT
Start: 2024-04-09 | End: 2024-04-09

## 2024-04-09 RX ORDER — ROCURONIUM BROMIDE 10 MG/ML
INJECTION, SOLUTION INTRAVENOUS AS NEEDED
Status: DISCONTINUED | OUTPATIENT
Start: 2024-04-09 | End: 2024-04-09

## 2024-04-09 RX ADMIN — SODIUM CHLORIDE, POTASSIUM CHLORIDE, SODIUM LACTATE AND CALCIUM CHLORIDE: 600; 310; 30; 20 INJECTION, SOLUTION INTRAVENOUS at 12:30

## 2024-04-09 RX ADMIN — SODIUM CHLORIDE, POTASSIUM CHLORIDE, SODIUM LACTATE AND CALCIUM CHLORIDE 40 ML/HR: 600; 310; 30; 20 INJECTION, SOLUTION INTRAVENOUS at 16:00

## 2024-04-09 RX ADMIN — ACETAMINOPHEN 650 MG: 325 TABLET ORAL at 20:58

## 2024-04-09 RX ADMIN — OXYCODONE HYDROCHLORIDE 10 MG: 5 TABLET ORAL at 18:44

## 2024-04-09 RX ADMIN — FENTANYL CITRATE 50 MCG: 50 INJECTION, SOLUTION INTRAMUSCULAR; INTRAVENOUS at 12:53

## 2024-04-09 RX ADMIN — MIDAZOLAM HYDROCHLORIDE 2 MG: 1 INJECTION, SOLUTION INTRAMUSCULAR; INTRAVENOUS at 12:38

## 2024-04-09 RX ADMIN — KETOROLAC TROMETHAMINE 15 MG: 30 INJECTION, SOLUTION INTRAMUSCULAR at 18:15

## 2024-04-09 RX ADMIN — HYDROMORPHONE HYDROCHLORIDE 0.5 MG: 1 INJECTION, SOLUTION INTRAMUSCULAR; INTRAVENOUS; SUBCUTANEOUS at 14:35

## 2024-04-09 RX ADMIN — ATORVASTATIN CALCIUM 40 MG: 40 TABLET, FILM COATED ORAL at 21:54

## 2024-04-09 RX ADMIN — PROPOFOL 200 MG: 10 INJECTION, EMULSION INTRAVENOUS at 12:45

## 2024-04-09 RX ADMIN — OXYCODONE HYDROCHLORIDE 5 MG: 5 TABLET ORAL at 14:41

## 2024-04-09 RX ADMIN — HYDROMORPHONE HYDROCHLORIDE 0.5 MG: 1 INJECTION, SOLUTION INTRAMUSCULAR; INTRAVENOUS; SUBCUTANEOUS at 14:09

## 2024-04-09 RX ADMIN — SODIUM CHLORIDE 10 ML/HR: 9 INJECTION, SOLUTION INTRAVENOUS at 11:30

## 2024-04-09 RX ADMIN — HYDROMORPHONE HYDROCHLORIDE 0.5 MG: 1 INJECTION, SOLUTION INTRAMUSCULAR; INTRAVENOUS; SUBCUTANEOUS at 14:15

## 2024-04-09 RX ADMIN — CEFAZOLIN 2 G: 1 INJECTION, POWDER, FOR SOLUTION INTRAMUSCULAR; INTRAVENOUS at 12:50

## 2024-04-09 RX ADMIN — MIRTAZAPINE 7.5 MG: 15 TABLET, FILM COATED ORAL at 21:54

## 2024-04-09 RX ADMIN — DEXAMETHASONE SODIUM PHOSPHATE 4 MG: 4 INJECTION, SOLUTION INTRAMUSCULAR; INTRAVENOUS at 12:55

## 2024-04-09 RX ADMIN — GABAPENTIN 300 MG: 300 CAPSULE ORAL at 20:58

## 2024-04-09 RX ADMIN — FENTANYL CITRATE 50 MCG: 50 INJECTION, SOLUTION INTRAMUSCULAR; INTRAVENOUS at 12:45

## 2024-04-09 RX ADMIN — ACETAMINOPHEN 650 MG: 325 TABLET ORAL at 16:30

## 2024-04-09 RX ADMIN — SUGAMMADEX 200 MG: 100 INJECTION, SOLUTION INTRAVENOUS at 13:41

## 2024-04-09 RX ADMIN — METRONIDAZOLE 500 MG: 500 INJECTION, SOLUTION INTRAVENOUS at 11:31

## 2024-04-09 RX ADMIN — HEPARIN SODIUM 5000 UNITS: 5000 INJECTION INTRAVENOUS; SUBCUTANEOUS at 11:24

## 2024-04-09 RX ADMIN — ONDANSETRON 4 MG: 2 INJECTION INTRAMUSCULAR; INTRAVENOUS at 12:55

## 2024-04-09 RX ADMIN — GABAPENTIN 300 MG: 300 CAPSULE ORAL at 16:29

## 2024-04-09 RX ADMIN — KETOROLAC TROMETHAMINE 30 MG: 30 INJECTION, SOLUTION INTRAMUSCULAR at 13:20

## 2024-04-09 RX ADMIN — LIDOCAINE HYDROCHLORIDE 100 MG: 20 INJECTION, SOLUTION EPIDURAL; INFILTRATION; INTRACAUDAL; PERINEURAL at 12:45

## 2024-04-09 RX ADMIN — ROCURONIUM BROMIDE 50 MG: 10 INJECTION, SOLUTION INTRAVENOUS at 12:45

## 2024-04-09 SDOH — SOCIAL STABILITY: SOCIAL INSECURITY: ARE YOU OR HAVE YOU BEEN THREATENED OR ABUSED PHYSICALLY, EMOTIONALLY, OR SEXUALLY BY ANYONE?: NO

## 2024-04-09 SDOH — SOCIAL STABILITY: SOCIAL INSECURITY: DO YOU FEEL UNSAFE GOING BACK TO THE PLACE WHERE YOU ARE LIVING?: NO

## 2024-04-09 SDOH — SOCIAL STABILITY: SOCIAL INSECURITY: HAVE YOU HAD THOUGHTS OF HARMING ANYONE ELSE?: NO

## 2024-04-09 SDOH — SOCIAL STABILITY: SOCIAL INSECURITY: HAS ANYONE EVER THREATENED TO HURT YOUR FAMILY OR YOUR PETS?: NO

## 2024-04-09 SDOH — SOCIAL STABILITY: SOCIAL INSECURITY: WERE YOU ABLE TO COMPLETE ALL THE BEHAVIORAL HEALTH SCREENINGS?: YES

## 2024-04-09 SDOH — SOCIAL STABILITY: SOCIAL INSECURITY: ABUSE: ADULT

## 2024-04-09 SDOH — SOCIAL STABILITY: SOCIAL INSECURITY: ARE THERE ANY APPARENT SIGNS OF INJURIES/BEHAVIORS THAT COULD BE RELATED TO ABUSE/NEGLECT?: NO

## 2024-04-09 SDOH — HEALTH STABILITY: MENTAL HEALTH: CURRENT SMOKER: 1

## 2024-04-09 SDOH — SOCIAL STABILITY: SOCIAL INSECURITY: DOES ANYONE TRY TO KEEP YOU FROM HAVING/CONTACTING OTHER FRIENDS OR DOING THINGS OUTSIDE YOUR HOME?: NO

## 2024-04-09 SDOH — SOCIAL STABILITY: SOCIAL INSECURITY: DO YOU FEEL ANYONE HAS EXPLOITED OR TAKEN ADVANTAGE OF YOU FINANCIALLY OR OF YOUR PERSONAL PROPERTY?: NO

## 2024-04-09 ASSESSMENT — PAIN SCALES - GENERAL
PAINLEVEL_OUTOF10: 0 - NO PAIN
PAINLEVEL_OUTOF10: 4
PAINLEVEL_OUTOF10: 3
PAINLEVEL_OUTOF10: 3
PAINLEVEL_OUTOF10: 0 - NO PAIN
PAINLEVEL_OUTOF10: 10 - WORST POSSIBLE PAIN
PAINLEVEL_OUTOF10: 5 - MODERATE PAIN
PAINLEVEL_OUTOF10: 3
PAINLEVEL_OUTOF10: 0 - NO PAIN
PAINLEVEL_OUTOF10: 7
PAINLEVEL_OUTOF10: 10 - WORST POSSIBLE PAIN
PAINLEVEL_OUTOF10: 3
PAINLEVEL_OUTOF10: 7
PAINLEVEL_OUTOF10: 4

## 2024-04-09 ASSESSMENT — LIFESTYLE VARIABLES
HOW OFTEN DO YOU HAVE A DRINK CONTAINING ALCOHOL: MONTHLY OR LESS
AUDIT-C TOTAL SCORE: 2
AUDIT-C TOTAL SCORE: 2
HOW MANY STANDARD DRINKS CONTAINING ALCOHOL DO YOU HAVE ON A TYPICAL DAY: 1 OR 2
SKIP TO QUESTIONS 9-10: 0
HOW OFTEN DO YOU HAVE 6 OR MORE DRINKS ON ONE OCCASION: LESS THAN MONTHLY

## 2024-04-09 ASSESSMENT — ACTIVITIES OF DAILY LIVING (ADL)
ADEQUATE_TO_COMPLETE_ADL: YES
WALKS IN HOME: INDEPENDENT
LACK_OF_TRANSPORTATION: NO
FEEDING YOURSELF: INDEPENDENT
HEARING - RIGHT EAR: FUNCTIONAL
JUDGMENT_ADEQUATE_SAFELY_COMPLETE_DAILY_ACTIVITIES: YES
PATIENT'S MEMORY ADEQUATE TO SAFELY COMPLETE DAILY ACTIVITIES?: YES
ASSISTIVE_DEVICE: EYEGLASSES
BATHING: INDEPENDENT
DRESSING YOURSELF: INDEPENDENT
HEARING - LEFT EAR: FUNCTIONAL
TOILETING: INDEPENDENT
GROOMING: INDEPENDENT

## 2024-04-09 ASSESSMENT — PAIN DESCRIPTION - LOCATION
LOCATION: ABDOMEN

## 2024-04-09 ASSESSMENT — COGNITIVE AND FUNCTIONAL STATUS - GENERAL
PATIENT BASELINE BEDBOUND: UNABLE TO ASSESS AT THIS TIME
MOBILITY SCORE: 24
DAILY ACTIVITIY SCORE: 24

## 2024-04-09 ASSESSMENT — PAIN - FUNCTIONAL ASSESSMENT
PAIN_FUNCTIONAL_ASSESSMENT: 0-10

## 2024-04-09 ASSESSMENT — PATIENT HEALTH QUESTIONNAIRE - PHQ9
2. FEELING DOWN, DEPRESSED OR HOPELESS: NOT AT ALL
SUM OF ALL RESPONSES TO PHQ9 QUESTIONS 1 & 2: 0
1. LITTLE INTEREST OR PLEASURE IN DOING THINGS: NOT AT ALL

## 2024-04-09 ASSESSMENT — COLUMBIA-SUICIDE SEVERITY RATING SCALE - C-SSRS
2. HAVE YOU ACTUALLY HAD ANY THOUGHTS OF KILLING YOURSELF?: NO
6. HAVE YOU EVER DONE ANYTHING, STARTED TO DO ANYTHING, OR PREPARED TO DO ANYTHING TO END YOUR LIFE?: NO
1. IN THE PAST MONTH, HAVE YOU WISHED YOU WERE DEAD OR WISHED YOU COULD GO TO SLEEP AND NOT WAKE UP?: NO

## 2024-04-09 ASSESSMENT — PAIN DESCRIPTION - DESCRIPTORS
DESCRIPTORS: ACHING

## 2024-04-09 NOTE — ANESTHESIA PROCEDURE NOTES
Airway  Date/Time: 4/9/2024 12:48 PM  Urgency: elective    Airway not difficult    Staffing  Performed: CRNA   Authorized by: Liana Boggs MD    Performed by: BERNIE Garcia-DANIELA  Patient location during procedure: OR    Indications and Patient Condition  Indications for airway management: anesthesia and airway protection  Spontaneous Ventilation: absent  Sedation level: deep  Preoxygenated: yes  Patient position: sniffing  MILS maintained throughout  Mask difficulty assessment: 1 - vent by mask    Final Airway Details  Final airway type: endotracheal airway      Successful airway: ETT  Cuffed: yes   Facilitating devices/methods: intubating stylet and cricoid pressure  Endotracheal tube insertion site: oral  Blade: Ruth Ann  Blade size: #3  ETT size (mm): 7.0  Placement verified by: chest auscultation and capnometry   Cuff volume (mL): 5  Measured from: lips  ETT to lips (cm): 21  Number of attempts at approach: 1  Ventilation between attempts: BVM    Additional Comments  Smooth IV induction, easy mask ventilations, atraumatic DVL/intubation, teeth/lips intact.  #16 Fr OG atraumatically placed for minimal light green drainage.

## 2024-04-09 NOTE — ANESTHESIA PREPROCEDURE EVALUATION
Patient: Tracey Carranza    Procedure Information       Date/Time: 04/09/24 1230    Procedure: Ileostomy Closure    Location: [facility] A OR 04 / [facility] A OR    Surgeons: Bryanna Presley MD                                                           Pre-Anesthesia Evaluation      Tracey Carranza is a 58 y.o. female who presents for procedure stated above. Patient endorses no changes in health or medications since PAT visit.       Medical History reviewed  Past Medical History:   Diagnosis Date   • Angulo esophagus    • HLD (hyperlipidemia)    • Hypothyroidism    • Rectal adenocarcinoma (CMS/HCC)    • Rheumatoid arthritis (CMS/HCC)    • Warthin tumor      Surgical History reviewed   Past Surgical History:   Procedure Laterality Date   • BACK SURGERY      Metronic device in back then removed 17 months later   • CERVICAL SPINE SURGERY     • COLON SURGERY  02/06/2024    colon and rectal resection w/ ileostomy, right oophrectomy   • MOUTH SURGERY      x2   • NASAL SEPTUM SURGERY     • PLANTAR FASCIA SURGERY Left    • SALIVARY GLAND SURGERY Left     Left parotidectomy with facial nerve  dissection and preservation   • SHOULDER SURGERY Right        Social History reviewed  Social History     Tobacco Use   • Smoking status: Every Day     Packs/day: 0.25     Years: 40.00     Additional pack years: 0.00     Total pack years: 10.00     Types: Cigarettes     Passive exposure: Never   • Smokeless tobacco: Never   • Tobacco comments:     Actively trying to quit    Vaping Use   • Vaping Use: Never used   Substance Use Topics   • Alcohol use: Not Currently     Comment: couple drinks/month   • Drug use: Never     Allergies and Medications reviewed  Allergies   Allergen Reactions   • Hydroxychloroquine Hives     Current Outpatient Medications   Medication Instructions   • acetaminophen (TYLENOL) 650 mg, oral, Every 6 hours PRN   • amitriptyline (Elavil) 25 mg tablet 1 tablet, oral, Nightly   • atorvastatin (LIPITOR) 40 mg, oral,  "Daily   • chlorhexidine (Peridex) 0.12 % solution Swish for 30 seconds and spit 15mL of solution the night before and morning of surgery   • cholecalciferol (VITAMIN D3) 100 mcg, oral, Daily   • folic acid (Folvite) 1 mg tablet 1 tablet, oral, Daily   • gabapentin (Neurontin) 100 mg capsule Take one capsule by mouth starting three days before surgery at bedtime.   • levothyroxine (SYNTHROID, LEVOXYL) 88 mcg, oral, Daily before breakfast   • methotrexate (Trexall) 2.5 mg tablet Take 1 tablet (2.5 mg total) by mouth 1 (one) time per week.  Saturdays-on hold until after 4/9/24 surgery   • mirtazapine (REMERON) 7.5 mg, oral, Nightly   • pantoprazole (PROTONIX) 40 mg, oral, Daily       Relevant Results reviewed  CBC:   Recent Labs     04/01/24  1359 02/13/24  0530 02/12/24  0652 02/11/24  0516 02/10/24  0530 02/09/24  1111   WBC 9.9 14.3* 13.6* 10.9 11.0 12.3*   HGB 13.8 11.7* 11.8* 12.7 13.5 14.0   HCT 42.2 37.2 36.0 39.1 40.2 40.9    402 442 454* 464* 476*   MCV 93 96 93 92 93 90     BMP/CMP:   Recent Labs     04/01/24  1359 02/21/24  1135 02/13/24  0530 02/12/24  0652 02/11/24  0516 02/10/24  0530 02/09/24  1111   * 137 130* 131* 131* 130* 128*   K 4.6 4.9 4.3 4.5 3.9 3.8 4.0    102 102 101 100 98 93*   BUN 11 8 6 12 20 23 20   CREATININE 0.92 0.83 0.58 0.67 0.80 1.02 1.16*   CO2 21 27 22 25 21 21 23   CALCIUM 9.9 10.0 8.5* 8.6 8.6 9.2 10.2   PROT  --  7.0 5.4* 5.9* 6.4 7.2 7.5   BILITOT  --  0.3 0.3 0.3 0.3 0.5 0.6   ALKPHOS  --  91 63 65 72 83 94   ALT  --  18 8 8 13 12 11   AST  --  11 9 8* 12 15 13   GLUCOSE 77 102* 124* 129* 122* 125* 120*     CrCl cannot be calculated (Patient's most recent lab result is older than the maximum 3 days allowed.).  Cardiac       No lab exists for component: \"CK\", \"CKMBP\"   Hemoglobin A1C:   Recent Labs     10/27/23  1044   HGBA1C 5.6     Thyroid/ Parathyroid:   Recent Labs     04/01/24  1359 10/27/23  1044 02/11/23  1130 06/07/22  0451 06/06/22  2318 " "05/20/22  1008 11/17/21  1004 05/19/21  1025 11/17/20  1136 05/20/20  1036 11/19/19  1156 01/15/19  1046   TSH  --   --  1.16 1.59  --  0.77 0.67 1.04 0.84 0.69 0.72 0.92   FREET4 1.16* 1.39  --   --  1.4  --   --   --   --   --   --   --          Toxicology:     Iron:   Recent Labs     06/07/22  0451   FERRITIN 118   TIBC 306   IRONSAT 6.5*     Coag:     No results found for: \"PROTIME\", \"INR\", \"PTT\"  ABO:   ABO TYPE   Date Value Ref Range Status   04/01/2024 A  Final      Lab Results   Component Value Date    ABO A 04/01/2024     ABG:         MRSA screen:   Lab Results   Component Value Date    STAPHMRSASCR No Staphylococcus aureus isolated 04/01/2024       Past Cardiology Tests (Last 3 Years):  EKG:  Encounter Date: 02/01/24   ECG 12 Lead   Result Value    Ventricular Rate 78    Atrial Rate 78    SC Interval 162    QRS Duration 88    QT Interval 378    QTC Calculation(Bazett) 430    P Axis 49    R Axis -35    T Axis 51    QRS Count 13    Q Onset 227    P Onset 146    P Offset 207    T Offset 416    QTC Fredericia 412    Narrative    Normal sinus rhythm  Left axis deviation  Abnormal ECG  No previous ECGs available  Confirmed by Ray Fitzgerald ([id number]) on 2/9/2024 4:25:22 PM     Echo:  No results found for this or any previous visit.    No results found for this or any previous visit.  Ejection Fractions:  No results found for: \"EF\"  Cath:  No results found for this or any previous visit.    Stress Test:  No results found for this or any previous visit.      Vitals  Visit Vitals  /67   Pulse 81   Temp 36.6 °C (97.9 °F) (Temporal)   Resp 18   Ht 1.651 m (5' 5\")   Wt 88.5 kg (195 lb 1.7 oz)   SpO2 96%   BMI 32.47 kg/m²   OB Status Postmenopausal   Smoking Status Every Day   BSA 2.01 m²          Echo:  No results found for this or any previous visit.    No results found for this or any previous visit.  Ejection Fractions:  No results found for: \"EF\"  Cath:  No results found for this or any previous visit.    Stress " "Test:  No results found for this or any previous visit.        Vitals  Visit Vitals  /67   Pulse 81   Temp 36.6 °C (97.9 °F) (Temporal)   Resp 18   Ht 1.651 m (5' 5\")   Wt 88.5 kg (195 lb 1.7 oz)   SpO2 96%   BMI 32.47 kg/m²   OB Status Postmenopausal   Smoking Status Every Day   BSA 2.01 m²             Relevant Problems   Cardiac   (+) Essential hypertension   (+) Pure hypercholesterolemia      Neuro   (+) Acute cervical radiculopathy   (+) Anxiety   (+) Depression   (+) Meralgia paresthetica of right side      GI   (+) Esophageal reflux   (+) Rectal adenocarcinoma (CMS/HCC)   (+) Rectal cancer (CMS/HCC)      Liver   (+) Rectal adenocarcinoma (CMS/HCC)   (+) Rectal cancer (CMS/HCC)      Endocrine   (+) Hypothyroid   (+) Obesity due to energy imbalance      Musculoskeletal   (+) Lumbosacral spondylosis without myelopathy   (+) Osteoarthritis   (+) RA (rheumatoid arthritis) (CMS/HCC)   (+) Rheumatoid arthritis with positive rheumatoid factor and anti-citrullinated protein antibody (ACPA) (CMS/HCC)      HEENT   (+) Sinusitis      ID   (+) Cold sore   (+) Local infection of wound       Clinical information reviewed:   Tobacco  Allergies  Meds   Med Hx  Surg Hx  OB Status  Fam Hx  Soc   Hx        NPO Detail:  NPO/Void Status  Carbohydrate Drink Given Prior to Surgery? : N  Date of Last Liquid: 04/09/24  Time of Last Liquid: 0830  Date of Last Solid: 04/08/24  Time of Last Solid: 1730  Last Intake Type: Clear fluids  Time of Last Void: 1030         Physical Exam    Airway  Mallampati: II  TM distance: >3 FB  Neck ROM: full     Cardiovascular   Rhythm: regular  Rate: normal     Dental    Pulmonary   Breath sounds clear to auscultation     Abdominal   (+) obese           Anesthesia Plan    History of general anesthesia?: yes  History of complications of general anesthesia?: no    ASA 3     general   (General with ETT. Standard ASA monitoring.)  The patient is a current smoker.  Patient was previously " instructed to abstain from smoking on day of procedure.  Patient smoked on day of procedure.    intravenous induction   Postoperative administration of opioids is intended.  Anesthetic plan and risks discussed with patient.    Plan discussed with CAA and CRNA.                                                              Pre-Anesthesia Evaluation      Tracey Carranza is a 58 y.o. female who presents for procedure stated above.     Medical History reviewed  Past Medical History:   Diagnosis Date   • Angulo esophagus    • HLD (hyperlipidemia)    • Hypothyroidism    • Rectal adenocarcinoma (CMS/HCC)    • Rheumatoid arthritis (CMS/HCC)    • Warthin tumor      Surgical History reviewed   Past Surgical History:   Procedure Laterality Date   • BACK SURGERY      Metronic device in back then removed 17 months later   • CERVICAL SPINE SURGERY     • COLON SURGERY  02/06/2024    colon and rectal resection w/ ileostomy, right oophrectomy   • MOUTH SURGERY      x2   • NASAL SEPTUM SURGERY     • PLANTAR FASCIA SURGERY Left    • SALIVARY GLAND SURGERY Left     Left parotidectomy with facial nerve  dissection and preservation   • SHOULDER SURGERY Right        Social History reviewed  Social History     Tobacco Use   • Smoking status: Every Day     Packs/day: 0.25     Years: 40.00     Additional pack years: 0.00     Total pack years: 10.00     Types: Cigarettes     Passive exposure: Never   • Smokeless tobacco: Never   • Tobacco comments:     Actively trying to quit    Vaping Use   • Vaping Use: Never used   Substance Use Topics   • Alcohol use: Not Currently     Comment: couple drinks/month   • Drug use: Never     Allergies and Medications reviewed  Allergies   Allergen Reactions   • Hydroxychloroquine Hives     Current Outpatient Medications   Medication Instructions   • acetaminophen (TYLENOL) 650 mg, oral, Every 6 hours PRN   • amitriptyline (Elavil) 25 mg tablet 1 tablet, oral, Nightly   • atorvastatin (LIPITOR) 40 mg, oral,  "Daily   • chlorhexidine (Peridex) 0.12 % solution Swish for 30 seconds and spit 15mL of solution the night before and morning of surgery   • cholecalciferol (VITAMIN D3) 100 mcg, oral, Daily   • folic acid (Folvite) 1 mg tablet 1 tablet, oral, Daily   • gabapentin (Neurontin) 100 mg capsule Take one capsule by mouth starting three days before surgery at bedtime.   • levothyroxine (SYNTHROID, LEVOXYL) 88 mcg, oral, Daily before breakfast   • methotrexate (Trexall) 2.5 mg tablet Take 1 tablet (2.5 mg total) by mouth 1 (one) time per week.  Saturdays-on hold until after 4/9/24 surgery   • mirtazapine (REMERON) 7.5 mg, oral, Nightly   • pantoprazole (PROTONIX) 40 mg, oral, Daily       Relevant Results reviewed  Recent Labs     04/01/24  1359 02/13/24  0530 02/12/24  0652   WBC 9.9 14.3* 13.6*   HGB 13.8 11.7* 11.8*   HCT 42.2 37.2 36.0    402 442   MCV 93 96 93     Recent Labs     04/01/24  1359 02/21/24  1135 02/13/24  0530 02/12/24  0652   * 137 130* 131*   K 4.6 4.9 4.3 4.5    102 102 101   BUN 11 8 6 12   CREATININE 0.92 0.83 0.58 0.67   CO2 21 27 22 25   CALCIUM 9.9 10.0 8.5* 8.6   PROT  --  7.0 5.4* 5.9*   BILITOT  --  0.3 0.3 0.3   ALKPHOS  --  91 63 65   ALT  --  18 8 8   AST  --  11 9 8*   GLUCOSE 77 102* 124* 129*     CrCl cannot be calculated (Patient's most recent lab result is older than the maximum 3 days allowed.).        No lab exists for component: \"CK\", \"CKMBP\"   Recent Labs     10/27/23  1044   HGBA1C 5.6     Recent Labs     04/01/24  1359 10/27/23  1044 02/11/23  1130 06/07/22  0451 06/06/22  2318 05/20/22  1008   TSH  --   --  1.16 1.59  --  0.77   FREET4 1.16* 1.39  --   --  1.4  --              Recent Labs     06/07/22  0451   FERRITIN 118   TIBC 306   IRONSAT 6.5*         No results found for: \"PROTIME\", \"INR\", \"PTT\"  Lab Results   Component Value Date    ABO A 04/01/2024             Lab Results   Component Value Date    STAPHMRSASCR No Staphylococcus aureus isolated 04/01/2024 " "      Past Cardiology Tests (Last 3 Years):  EKG:  Encounter Date: 02/01/24   ECG 12 Lead   Result Value    Ventricular Rate 78    Atrial Rate 78    [place] Interval 162    QRS Duration 88    QT Interval 378    QTC Calculation(Bazett) 430    P Axis 49    R Axis -35    T Axis 51    QRS Count 13    Q Onset 227    P Onset 146    P Offset 207    T Offset 416    QTC Fredericia 412    Narrative    Normal sinus rhythm  Left axis deviation  Abnormal ECG  No previous ECGs available  Confirmed by Ray Fitzgerald ([id number]) on 2/9/2024 4:25:22 PM     [unfilled]  Echo:  No results found for this or any previous visit.    No results found for this or any previous visit.  Ejection Fractions:  No results found for: \"EF\"  Cath:  No results found for this or any previous visit.    Stress Test:  No results found for this or any previous visit.    MR CERVICAL SPINE WO IV CONTRAST 05/31/2022    Narrative  MRN: [id number]  Patient Name: SAHARA HOLLY    STUDY:  MRI CERVICAL WO; ;  5/31/2022 10:11 am    INDICATION:  NECK PAIN  M54.2: Cervical pain.    COMPARISON:  None.    ACCESSION NUMBER(S):  [id number]    ORDERING CLINICIAN:  DARIELA BLUM    TECHNIQUE:  MRI of the cervical spine was performed with acquisition of sagittal  T2, sagittal T1, sagittal STIR, axial T1, and axial T2 weighted  sequences.    FINDINGS:  Evaluation is somewhat degraded due to patient motion.    There is straightening of the cervical spine. There are postoperative  changes from anterior cervical discectomy and fusion at C5-C6.  Current study is not tailored to assess the surgical hardware.  Visualized vertebral body and intervertebral disc heights are  maintained. Marrow signal is overall within normal limits.    The cervical spinal cord is normal in signal and caliber.    At C2-C3, there is no posterior disc contour abnormality. There is no  spinal canal stenosis or neural foraminal narrowing. There is no  facet osteoarthropathy.    At C3-C4, there is no striking posterior disc " contour abnormality.  There is no spinal canal stenosis or neural foraminal narrowing.  There is at most mild right facet osteoarthropathy. There is no  striking left facet osteoarthropathy.    At C4-C5, there is a disc osteophyte complex which partially effaces  the ventral thecal sac. There is no spinal canal stenosis. There is  no narrowing of the left neural foramen or left facet  osteoarthropathy. There is mild right neural foraminal narrowing due  to uncovertebral hypertrophy and marked right facet osteoarthropathy.  There is STIR hyperintense signal within the right C5 posterior  elements which probably reflects degenerative osseous edema versus  reactive hypervascularity. There is no striking left facet  osteoarthropathy.    At C5-C6, there is no posterior disc contour abnormality. There is no  spinal canal stenosis or neural foraminal narrowing. There is no  facet osteoarthropathy.    At C6-C7, there is a disc osteophyte complex which along with  ligamentum flavum thickening causes mild spinal canal stenosis. There  is mild bilateral neural foraminal narrowing due to uncovertebral  hypertrophy. There is no facet osteoarthropathy.    At C7-T1, there is no striking posterior disc contour abnormality.  There is no spinal canal stenosis or neural foraminal narrowing.  There is no striking facet osteoarthropathy.    There is a 2 x 2.3 cm mass located within or along the posterior  aspect of the superficial lobe of the right parotid gland and  anterior to the right sternocleidomastoid muscle. There is T2  hypointense and T2 hyperintense signal along the lateral periphery of  this mass. This mass may reflect enlarged lymph node versus an  intrinsic parotid neoplasm. There are also partially visualized  masses located within the left parotid gland which may also reflect  lymph nodes.    Impression  1. Marked right facet osteoarthropathy at the level of C4-C5 with  signal abnormality within the right C5 posterior  elements which may  reflect edema versus reactive hypervascularity.    2. Additional multilevel degenerative changes of the cervical spine  without striking spinal canal stenosis at any level.    3. Multiple masses located within the visualized bilateral parotid  glands which may reflect enlarged lymph nodes/intrinsic parotid  neoplasms and the largest is located within the right parotid gland.  Consider consultation with patient's primary care physician and/or  ENT physician for further evaluation.    This study was interpreted at [facility].    Cardiac Imaging:  Results for orders placed during the hospital encounter of 02/06/24    XR chest abdomen for OG NG placement    Narrative  Interpreted By:  Sonal Mckeon,  STUDY:  XR CHEST ABDOMEN FOR OG NG PLACEMENT;  2/10/2024 5:53 pm  2 views.    INDICATION:  Signs/Symptoms:NGT placement.    COMPARISON:  CT abdomen and pelvis 01/02/2024    ACCESSION NUMBER(S):  [id number]    ORDERING CLINICIAN:  MADHAVI ORTEGA    TECHNIQUE:  Abdomen supine and upright views  Chest PA view    FINDINGS:  ABDOMEN:  There is a nasogastric tube with the tip in the mid stomach and side  hole beyond the gastroesophageal junction. There is no gastric  distention. There are moderately dilated loops of proximal small  bowel. The lower abdomen and pelvis are not included.  There is no free intraperitoneal air.  There are no pathologic calcifications.      CHEST:  Cardiomediastinal silhouette in normal in size and configuration.  There is minimal left basilar atelectasis.  The lungs are otherwise clear.  No acute osseous changes.  Status post ventral fusion lower cervical spine.    Impression  1.  Nasogastric tube with the tip in the mid stomach and side hole  beyond the gastroesophageal junction.  2. Moderately dilated proximal small bowel. The lower abdomen and  pelvis are not included.  3.  Mild left basilar atelectasis.      MACRO:  None    Signed by:  Sonal Linda 2/11/2024 7:54 AM  Dictation workstation:   [id number]      === 01/08/24 ===    MR RECTUM W AND WO CONTRAST    - Impression -  1. Semi circumferential polypoidal mid rectal mass as detailed above  is consistent with rectal neoplasm. MR-imaging based stage of rectal  cancer is T1/2.  2. No enlarged mesorectal lymph nodes which meet size criteria. Based  on MRI the ana stage is N0.      I personally reviewed the images/study and I agree with the resident  Gustavo Marcus's findings as stated. This study was interpreted  at [place], [place].    Signed by: Trung Perez 1/11/2024 9:22 AM  Dictation workstation:   [id number]  === 02/06/24 ===    CT HEAD WO IV CONTRAST    - Impression -  No evidence of acute cortical infarct or intracranial hemorrhage.    MACRO:  None      Signed by: Zackery Purvis 2/9/2024 8:01 PM  Dictation workstation:   [id number]    Imaging  === 02/06/24 ===    XR CHEST, ABDOMEN RADIOGRAPH FOR OG/NG PLACEMENT    - Impression -  1.  Nasogastric tube with the tip in the mid stomach and side hole  beyond the gastroesophageal junction.  2. Moderately dilated proximal small bowel. The lower abdomen and  pelvis are not included.  3.  Mild left basilar atelectasis.      MACRO:  None    Signed by: Sonal Mckeon 2/11/2024 7:54 AM  Dictation workstation:   [id number]    No image results found.  MR CERVICAL SPINE WO IV CONTRAST 05/31/2022    Narrative  MRN: [id number]  Patient Name: SAHARA HOLLY    STUDY:  MRI CERVICAL WO; ;  5/31/2022 10:11 am    INDICATION:  NECK PAIN  M54.2: Cervical pain.    COMPARISON:  None.    ACCESSION NUMBER(S):  [id number]    ORDERING CLINICIAN:  DARIELA BLUM    TECHNIQUE:  MRI of the cervical spine was performed with acquisition of sagittal  T2, sagittal T1, sagittal STIR, axial T1, and axial T2 weighted  sequences.    FINDINGS:  Evaluation is somewhat degraded due to patient motion.    There is  straightening of the cervical spine. There are postoperative  changes from anterior cervical discectomy and fusion at C5-C6.  Current study is not tailored to assess the surgical hardware.  Visualized vertebral body and intervertebral disc heights are  maintained. Marrow signal is overall within normal limits.    The cervical spinal cord is normal in signal and caliber.    At C2-C3, there is no posterior disc contour abnormality. There is no  spinal canal stenosis or neural foraminal narrowing. There is no  facet osteoarthropathy.    At C3-C4, there is no striking posterior disc contour abnormality.  There is no spinal canal stenosis or neural foraminal narrowing.  There is at most mild right facet osteoarthropathy. There is no  striking left facet osteoarthropathy.    At C4-C5, there is a disc osteophyte complex which partially effaces  the ventral thecal sac. There is no spinal canal stenosis. There is  no narrowing of the left neural foramen or left facet  osteoarthropathy. There is mild right neural foraminal narrowing due  to uncovertebral hypertrophy and marked right facet osteoarthropathy.  There is STIR hyperintense signal within the right C5 posterior  elements which probably reflects degenerative osseous edema versus  reactive hypervascularity. There is no striking left facet  osteoarthropathy.    At C5-C6, there is no posterior disc contour abnormality. There is no  spinal canal stenosis or neural foraminal narrowing. There is no  facet osteoarthropathy.    At C6-C7, there is a disc osteophyte complex which along with  ligamentum flavum thickening causes mild spinal canal stenosis. There  is mild bilateral neural foraminal narrowing due to uncovertebral  hypertrophy. There is no facet osteoarthropathy.    At C7-T1, there is no striking posterior disc contour abnormality.  There is no spinal canal stenosis or neural foraminal narrowing.  There is no striking facet osteoarthropathy.    There is a 2 x 2.3 cm  "mass located within or along the posterior  aspect of the superficial lobe of the right parotid gland and  anterior to the right sternocleidomastoid muscle. There is T2  hypointense and T2 hyperintense signal along the lateral periphery of  this mass. This mass may reflect enlarged lymph node versus an  intrinsic parotid neoplasm. There are also partially visualized  masses located within the left parotid gland which may also reflect  lymph nodes.    Impression  1. Marked right facet osteoarthropathy at the level of C4-C5 with  signal abnormality within the right C5 posterior elements which may  reflect edema versus reactive hypervascularity.    2. Additional multilevel degenerative changes of the cervical spine  without striking spinal canal stenosis at any level.    3. Multiple masses located within the visualized bilateral parotid  glands which may reflect enlarged lymph nodes/intrinsic parotid  neoplasms and the largest is located within the right parotid gland.  Consider consultation with patient's primary care physician and/or  ENT physician for further evaluation.    This study was interpreted at [facility].    Vitals  Visit Vitals  /67   Pulse 81   Temp 36.6 °C (97.9 °F) (Temporal)   Resp 18   Ht 1.651 m (5' 5\")   Wt 88.5 kg (195 lb 1.7 oz)   SpO2 96%   BMI 32.47 kg/m²   OB Status Postmenopausal   Smoking Status Every Day   BSA 2.01 m²         4/9/2024    11:10 AM 4/1/2024     1:11 PM 3/19/2024     2:17 PM 3/14/2024    11:45 AM 3/13/2024    10:22 AM 3/4/2024    10:27 AM 2/26/2024    12:01 PM   BP REVIEW   BP (ultimate) 131/67 129/59 120/70 115/62 143/83 128/80 130/70                    "

## 2024-04-09 NOTE — OP NOTE
Ileostomy Closure Operative Note     Date: 2024  OR Location: [facility] OR    Name: Tracey Carranza, : 1965, Age: 58 y.o., MRN: [id number], Sex: female    Diagnosis  Pre-op Diagnosis     * Rectal cancer (CMS/HCC) [C20] Post-op Diagnosis     * Rectal cancer (CMS/HCC) [C20]     Procedures  Ileostomy Closure  63514 - [place] CLOSURE ENTEROSTOMY LG/SMALL INTESTINE      Surgeons      * Bryanna Presley - Primary    Resident/Fellow/Other Assistant:  Surgeon(s) and Role:    Procedure Summary  Anesthesia: General  ASA: III  Anesthesia Staff: Anesthesiologist: Liana Boggs MD  CRNA: BERNIE Garcia-CRNA  Estimated Blood Loss: 0mL  Intra-op Medications:   Administrations occurring from 1230 to 1400 on 24:   Medication Name Total Dose   BUPivacaine HCl (Marcaine) 0.5 % (5 mg/mL) injection 30 mL   sodium chloride 0.9 % irrigation solution 1,000 mL              Anesthesia Record               Intraprocedure I/O Totals          Intake    LR infusion 700.00 mL    Total Intake 700 mL       Output    Est. Blood Loss 20 mL    Total Output 20 mL       Net    Net Volume 680 mL          Specimen:   ID Type Source Tests Collected by Time   1 : Ileostomy Tissue COLOSTOMY (STOMA) SURGICAL PATHOLOGY EXAM Karrie Alcaraz RN 2024 1343        Staff:   Circulator: Karrie Alcaraz RN  Relief Scrub: Jimena Burton  Scrub Person: Vale aCstillo; Ruth Ann Yoder         Drains and/or Catheters: * None in log *      Findings: No adhesions    Indications: Tracey Carranza is an 58 y.o. female who is having surgery for Rectal cancer (CMS/HCC) [C20].     The patient was seen in the preoperative area. The risks, benefits, complications, treatment options, non-operative alternatives, expected recovery and outcomes were discussed with the patient. The possibilities of reaction to medication, pulmonary aspiration, injury to surrounding structures, bleeding, recurrent infection, the need for additional procedures, failure to diagnose a  condition, and creating a complication requiring transfusion or operation were discussed with the patient. The patient concurred with the proposed plan, giving informed consent.  The site of surgery was properly noted/marked if necessary per policy. The patient has been actively warmed in preoperative area. Preoperative antibiotics have been ordered and given within 1 hours of incision. Venous thrombosis prophylaxis have been ordered including bilateral sequential compression devices and chemical prophylaxis    Procedure Details: Patient was brought to the operating room in the supine position.  The huddle was performed, and general endotracheal anesthesia was induced.  IV antibiotics and a heparin shot were administered, and the patient was prepped using a combination of liquid Betadine and ChloraPrep and taped and draped in the usual sterile fashion.    The stoma was instilled using 0.5% marcaine.  A circumferential incision was made around the stoma using the Bovie cautery the subcutaneous tissues were divided using Metzenbaum scissors and Bovie cautery.  The peritoneal cavity was entered and the stoma was dissected free from the surrounding fascial tissues.  No serosal tears were created.  It was decided to perform a side-to-side stapled anastomosis due to small caliber intestines. The 75 mm blue load stapler was entered into the distal lumen and into the proximal lumen of the stoma.  A side-to-side antimesenteric anastomosis was performed.  The common enterotomy was transected with the mesentery using a reload of the 75 mm green load stapler.  A 3.0 Vicryl antitension stitch was placed between the 2 limbs, and the common staple line was oversewn using interrupted 3-0 Vicryl sutures.  The bowel was returned to the peritoneal cavity there were minimal adhesions.  The fascia was then closed using interrupted figure-of-eight 0 PDS sutures the area was instilled using the remainder of the 0.5% Marcaine.  The skin  was pursestringed down using 3.0 Vicryl and covered using a Primapore and ABD and tape.      Complications:  None; patient tolerated the procedure well.    Disposition: PACU - hemodynamically stable.  Condition: stable       Attending Attestation: I was present and scrubbed for the entire procedure.    Bryanna Presley  Phone Number: [phone]

## 2024-04-09 NOTE — ANESTHESIA POSTPROCEDURE EVALUATION
Patient: Tracey Carranza    Procedure Summary       Date: 04/09/24 Room / Location: [facility] 04 / [facility] OR    Anesthesia Start: 1230 Anesthesia Stop: 1350    Procedure: Ileostomy Closure Diagnosis:       Rectal cancer (CMS/HCC)      (Rectal cancer (CMS/HCC) [C20])    Surgeons: Bryanna Presley MD Responsible Provider: Liana Boggs MD    Anesthesia Type: general ASA Status: 3            Anesthesia Type: general    Vitals Value Taken Time   /69 04/09/24 1400   Temp 36.3 °C (97.3 °F) 04/09/24 1349   Pulse 66 04/09/24 1400   Resp 17 04/09/24 1400   SpO2 100 % 04/09/24 1400       Anesthesia Post Evaluation    Patient location during evaluation: PACU  Patient participation: complete - patient participated  Level of consciousness: awake  Pain management: satisfactory to patient  Multimodal analgesia pain management approach  Airway patency: patent  Cardiovascular status: hemodynamically stable  Respiratory status: spontaneous ventilation  Hydration status: acceptable  Postoperative Nausea and Vomiting: none        No notable events documented.

## 2024-04-09 NOTE — BRIEF OP NOTE
Date: 2024  OR Location: [facility] OR    Name: Tracey Carranza, : 1965, Age: 58 y.o., MRN: [id number], Sex: female    Diagnosis  Pre-op Diagnosis     * Rectal cancer (CMS/HCC) [C20] Post-op Diagnosis     * Rectal cancer (CMS/HCC) [C20]     Procedures  Ileostomy Closure  05464 - [place] CLOSURE ENTEROSTOMY LG/SMALL INTESTINE      Surgeons      * Bryanna Presley - Primary    Resident/Fellow/Other Assistant:  Surgeon(s) and Role:    Procedure Summary  Anesthesia: General  ASA: III  Anesthesia Staff: Anesthesiologist: Liana Boggs MD  CRNA: BERNIE Garcia-CRNA  Estimated Blood Loss: 2 mL  Intra-op Medications:   Administrations occurring from 1230 to 1400 on 24:   Medication Name Total Dose   BUPivacaine HCl (Marcaine) 0.5 % (5 mg/mL) injection 30 mL   sodium chloride 0.9 % irrigation solution 1,000 mL              Anesthesia Record               Intraprocedure I/O Totals          Intake    LR infusion 700.00 mL    Total Intake 700 mL       Output    Est. Blood Loss 20 mL    Total Output 20 mL       Net    Net Volume 680 mL          Specimen:   ID Type Source Tests Collected by Time   1 : Ileostomy Tissue COLOSTOMY (STOMA) SURGICAL PATHOLOGY EXAM Karrie Alcaraz RN 2024 2635        Staff:   Circulator: Karrie Alcaraz RN  Relief Scrub: Jimena Burton  Scrub Person: Vale Yoder          Findings: Ileostomy reversed with stapled anastomosis given small lumen of bowel    Complications:  None; patient tolerated the procedure well.     Disposition: PACU - hemodynamically stable.  Condition: stable  Specimens Collected:   ID Type Source Tests Collected by Time   1 : Ileostomy Tissue COLOSTOMY (STOMA) SURGICAL PATHOLOGY EXAM Karrie Alcaraz RN 2024 1343     Attending Attestation:     Bryanna Presley  Phone Number: [phone]

## 2024-04-10 ENCOUNTER — PHARMACY VISIT (OUTPATIENT)
Dept: PHARMACY | Facility: CLINIC | Age: 59
End: 2024-04-10
Payer: COMMERCIAL

## 2024-04-10 VITALS
RESPIRATION RATE: 18 BRPM | WEIGHT: 194.67 LBS | OXYGEN SATURATION: 99 % | DIASTOLIC BLOOD PRESSURE: 58 MMHG | HEIGHT: 65 IN | BODY MASS INDEX: 32.43 KG/M2 | TEMPERATURE: 97.3 F | SYSTOLIC BLOOD PRESSURE: 114 MMHG | HEART RATE: 69 BPM

## 2024-04-10 LAB
ANION GAP SERPL CALC-SCNC: 12 MMOL/L (ref 10–20)
BUN SERPL-MCNC: 20 MG/DL (ref 6–23)
CALCIUM SERPL-MCNC: 8.4 MG/DL (ref 8.6–10.3)
CHLORIDE SERPL-SCNC: 105 MMOL/L (ref 98–107)
CO2 SERPL-SCNC: 18 MMOL/L (ref 21–32)
CREAT SERPL-MCNC: 1.03 MG/DL (ref 0.5–1.05)
EGFRCR SERPLBLD CKD-EPI 2021: 63 ML/MIN/1.73M*2
ERYTHROCYTE [DISTWIDTH] IN BLOOD BY AUTOMATED COUNT: 14.1 % (ref 11.5–14.5)
GLUCOSE SERPL-MCNC: 126 MG/DL (ref 74–99)
HCT VFR BLD AUTO: 32.5 % (ref 36–46)
HGB BLD-MCNC: 10.7 G/DL (ref 12–16)
MCH RBC QN AUTO: 31.2 PG (ref 26–34)
MCHC RBC AUTO-ENTMCNC: 32.9 G/DL (ref 32–36)
MCV RBC AUTO: 95 FL (ref 80–100)
NRBC BLD-RTO: 0 /100 WBCS (ref 0–0)
PLATELET # BLD AUTO: 334 X10*3/UL (ref 150–450)
POTASSIUM SERPL-SCNC: 4.4 MMOL/L (ref 3.5–5.3)
RBC # BLD AUTO: 3.43 X10*6/UL (ref 4–5.2)
SODIUM SERPL-SCNC: 131 MMOL/L (ref 136–145)
WBC # BLD AUTO: 15.2 X10*3/UL (ref 4.4–11.3)

## 2024-04-10 PROCEDURE — 2500000004 HC RX 250 GENERAL PHARMACY W/ HCPCS (ALT 636 FOR OP/ED)

## 2024-04-10 PROCEDURE — 2500000002 HC RX 250 W HCPCS SELF ADMINISTERED DRUGS (ALT 637 FOR MEDICARE OP, ALT 636 FOR OP/ED): Mod: MUE | Performed by: STUDENT IN AN ORGANIZED HEALTH CARE EDUCATION/TRAINING PROGRAM

## 2024-04-10 PROCEDURE — RXMED WILLOW AMBULATORY MEDICATION CHARGE

## 2024-04-10 PROCEDURE — 85027 COMPLETE CBC AUTOMATED: CPT

## 2024-04-10 PROCEDURE — 2500000002 HC RX 250 W HCPCS SELF ADMINISTERED DRUGS (ALT 637 FOR MEDICARE OP, ALT 636 FOR OP/ED): Performed by: STUDENT IN AN ORGANIZED HEALTH CARE EDUCATION/TRAINING PROGRAM

## 2024-04-10 PROCEDURE — 80048 BASIC METABOLIC PNL TOTAL CA: CPT

## 2024-04-10 PROCEDURE — 36415 COLL VENOUS BLD VENIPUNCTURE: CPT

## 2024-04-10 PROCEDURE — 2500000001 HC RX 250 WO HCPCS SELF ADMINISTERED DRUGS (ALT 637 FOR MEDICARE OP)

## 2024-04-10 PROCEDURE — 99231 SBSQ HOSP IP/OBS SF/LOW 25: CPT | Performed by: NURSE PRACTITIONER

## 2024-04-10 RX ORDER — ONDANSETRON 4 MG/1
4 TABLET, ORALLY DISINTEGRATING ORAL EVERY 8 HOURS PRN
Qty: 20 TABLET | Refills: 0 | Status: SHIPPED | OUTPATIENT
Start: 2024-04-10 | End: 2024-06-11 | Stop reason: ALTCHOICE

## 2024-04-10 RX ORDER — OXYCODONE HYDROCHLORIDE 5 MG/1
5 TABLET ORAL EVERY 4 HOURS PRN
Qty: 28 TABLET | Refills: 0 | Status: SHIPPED | OUTPATIENT
Start: 2024-04-10 | End: 2024-04-17

## 2024-04-10 RX ADMIN — ACETAMINOPHEN 650 MG: 325 TABLET ORAL at 00:10

## 2024-04-10 RX ADMIN — GABAPENTIN 300 MG: 300 CAPSULE ORAL at 09:31

## 2024-04-10 RX ADMIN — OXYCODONE HYDROCHLORIDE 10 MG: 5 TABLET ORAL at 11:58

## 2024-04-10 RX ADMIN — ACETAMINOPHEN 650 MG: 325 TABLET ORAL at 11:58

## 2024-04-10 RX ADMIN — ACETAMINOPHEN 650 MG: 325 TABLET ORAL at 04:18

## 2024-04-10 RX ADMIN — KETOROLAC TROMETHAMINE 15 MG: 30 INJECTION, SOLUTION INTRAMUSCULAR at 00:10

## 2024-04-10 RX ADMIN — ACETAMINOPHEN 650 MG: 325 TABLET ORAL at 09:31

## 2024-04-10 RX ADMIN — KETOROLAC TROMETHAMINE 15 MG: 30 INJECTION, SOLUTION INTRAMUSCULAR at 06:24

## 2024-04-10 RX ADMIN — LEVOTHYROXINE SODIUM 88 MCG: 88 TABLET ORAL at 06:24

## 2024-04-10 RX ADMIN — ENOXAPARIN SODIUM 40 MG: 40 INJECTION SUBCUTANEOUS at 09:31

## 2024-04-10 ASSESSMENT — COGNITIVE AND FUNCTIONAL STATUS - GENERAL
MOBILITY SCORE: 24
DAILY ACTIVITIY SCORE: 24
DAILY ACTIVITIY SCORE: 24
MOBILITY SCORE: 24
DAILY ACTIVITIY SCORE: 24
DAILY ACTIVITIY SCORE: 24
MOBILITY SCORE: 24
MOBILITY SCORE: 24

## 2024-04-10 ASSESSMENT — PAIN SCALES - GENERAL
PAINLEVEL_OUTOF10: 8
PAINLEVEL_OUTOF10: 0 - NO PAIN
PAINLEVEL_OUTOF10: 0 - NO PAIN

## 2024-04-10 ASSESSMENT — PAIN - FUNCTIONAL ASSESSMENT
PAIN_FUNCTIONAL_ASSESSMENT: 0-10

## 2024-04-10 ASSESSMENT — ACTIVITIES OF DAILY LIVING (ADL): LACK_OF_TRANSPORTATION: NO

## 2024-04-10 ASSESSMENT — PAIN DESCRIPTION - LOCATION: LOCATION: ABDOMEN

## 2024-04-10 ASSESSMENT — PAIN SCALES - WONG BAKER: WONGBAKER_NUMERICALRESPONSE: HURTS EVEN MORE

## 2024-04-10 NOTE — DISCHARGE SUMMARY
Discharge Diagnosis  Rectal cancer (CMS/HCC)    Issues Requiring Follow-Up  S/p ileostomy closure    Test Results Pending At Discharge  Pending Labs       Order Current Status    Surgical Pathology Exam In process            Hospital Course   58 yr old female with S/p ileostomy closure on 4/9/24 with . Please see operative report for full details. Patient tolerated the procedure well and recovered briefly in PACU before being transitioned to regular nursing floor. Post-op course was uncomplicated. Diet was advanced as tolerated.  IV medication transitioned to oral as diet advanced. On the day of discharge, the pt was tolerating a diet, pain was controlled on PO pain medication, and they were ambulating and voiding spontaneously. Pt discharged home on 4/10/24 in stable condition with instructions to follow up as outpatient.       Pertinent Physical Exam At Time of Discharge  Physical Exam  Cardiovascular:      Pulses: Normal pulses.   Pulmonary:      Effort: Pulmonary effort is normal.   Abdominal:      Comments: Abdomen soft, non distended, appropriately tender, active bowel sounds, dressing c/d/i   Musculoskeletal:         General: Normal range of motion.   Skin:     General: Skin is warm.      Capillary Refill: Capillary refill takes less than 2 seconds.   Neurological:      General: No focal deficit present.      Mental Status: She is alert.   Psychiatric:         Mood and Affect: Mood normal.         Home Medications     Medication List      START taking these medications     ondansetron ODT 4 mg disintegrating tablet; Commonly known as:   Zofran-ODT; Take 1 tablet (4 mg) by mouth every 8 hours if needed for   nausea or vomiting.   oxyCODONE 5 mg immediate release tablet; Commonly known as: Roxicodone;   Take 1 tablet (5 mg) by mouth every 4 hours if needed for moderate pain (4   - 6) for up to 7 days.     CONTINUE taking these medications     acetaminophen 325 mg tablet; Commonly known as: Tylenol    amitriptyline 25 mg tablet; Commonly known as: Elavil   atorvastatin 40 mg tablet; Commonly known as: Lipitor; Take 1 tablet (40   mg) by mouth once daily.   cholecalciferol 50 MCG (2000 UT) tablet; Commonly known as: Vitamin D3;   Take 2 tablets (100 mcg) by mouth once daily.   folic acid 1 mg tablet; Commonly known as: Folvite   levothyroxine 88 mcg tablet; Commonly known as: Synthroid, Levoxyl; Take   1 tablet (88 mcg) by mouth once daily in the morning. Take before meals.   methotrexate 2.5 mg tablet; Commonly known as: Trexall   mirtazapine 7.5 mg tablet; Commonly known as: Remeron; Take 1 tablet   (7.5 mg) by mouth once daily at bedtime.   pantoprazole 40 mg EC tablet; Commonly known as: Protonix; Take 1 tablet   (40 mg) by mouth once daily.     STOP taking these medications     chlorhexidine 0.12 % solution; Commonly known as: Peridex   gabapentin 100 mg capsule; Commonly known as: Neurontin       Outpatient Follow-Up  Future Appointments   Date Time Provider Department Center   10/22/2024 10:00 AM Marina Casper MD [id number] [facility]       Daniela Sandoval APRN-CNP

## 2024-04-10 NOTE — CARE PLAN
The patient's goals for the shift include      The clinical goals for the shift include Patient will remain comfortable throughout the shift.    Over the shift, the patient did not make progress toward the following goals. Barriers to progression include ileostomy closure. Recommendations to address these barriers include pain meds as needed and drinking plenty of fluids.

## 2024-04-10 NOTE — PROGRESS NOTES
04/10/24 0927   Discharge Planning   Living Arrangements Alone   Support Systems Family members   Assistance Needed none   Type of Residence Private residence   Number of Stairs to Enter Residence 1   Number of Stairs Within Residence 10   Do you have animals or pets at home? Yes   Type of Animals or Pets 2 dogs   Who is requesting discharge planning? Provider   Home or Post Acute Services None   Patient expects to be discharged to: home   Does the patient need discharge transport arranged? No   Financial Resource Strain   How hard is it for you to pay for the very basics like food, housing, medical care, and heating? Not hard   Housing Stability   In the last 12 months, was there a time when you were not able to pay the mortgage or rent on time? N   In the last 12 months, how many places have you lived? 1   In the last 12 months, was there a time when you did not have a steady place to sleep or slept in a shelter (including now)? N   Transportation Needs   In the past 12 months, has lack of transportation kept you from medical appointments or from getting medications? no   In the past 12 months, has lack of transportation kept you from meetings, work, or from getting things needed for daily living? No     I met with patient at bedside and explained tcc role. She lives in a house alone, ambulates independently and drives. POD 1 ileostomy closure. Patient states she is moving bowels and ready to go home. Denies any c needs at this time.

## 2024-04-10 NOTE — PROGRESS NOTES
Tracey Carranza is a 58 y.o. female on day 0 of admission presenting with Rectal cancer (CMS/HCC).      Subjective   Doing well post-op. Tolerated diet. +void. Pain controlled       Objective     PE:  Constitutional: A&Ox3, calm and cooperative, NAD  Eyes: EOMI, clear sclera   Cardiovascular: Normal rate and regular rhythm. No murmurs  Respiratory/Thorax: CTAB, on RA  Gastrointestinal: abd soft, mildly distended, +BS, prior ileostomy covered with CDI dressing  Genitourinary: Voiding independently   Musculoskeletal: ROM intact, no joint swelling, normal strength  Extremities: No peripheral edema  Neurological: A&Ox3, No focal deficits   Psychological: Appropriate mood and behavior      Last Recorded Vitals  Vitals:    04/09/24 1500 04/09/24 1515 04/09/24 1545 04/09/24 2006   BP: 135/64 135/57 128/72 110/75   BP Location: Right arm Right arm  Right arm   Patient Position: Lying Lying  Lying   Pulse: 73 71 70 68   Resp: 16 15 16 16   Temp:   36.8 °C (98.2 °F) 36.6 °C (97.8 °F)   TempSrc:   Temporal Temporal   SpO2: 96% 96% 98% 96%   Weight:       Height:             Relevant Results    Imaging:     .=== 02/06/24 ===    XR CHEST, ABDOMEN RADIOGRAPH FOR OG/NG PLACEMENT    - Impression -  1.  Nasogastric tube with the tip in the mid stomach and side hole  beyond the gastroesophageal junction.  2. Moderately dilated proximal small bowel. The lower abdomen and  pelvis are not included.  3.  Mild left basilar atelectasis.      MACRO:  None    Signed by: Sonal Mckeon 2/11/2024 7:54 AM  Dictation workstation:   [id number]   .=== 02/06/24 ===    CT HEAD WO IV CONTRAST    - Impression -  No evidence of acute cortical infarct or intracranial hemorrhage.    MACRO:  None      Signed by: Zackery Purvis 2/9/2024 8:01 PM  Dictation workstation:   [id number]         Lab Results:   Lab Results   Component Value Date    WBC 9.9 04/01/2024    HGB 13.8 04/01/2024    HCT 42.2 04/01/2024    MCV 93 04/01/2024     04/01/2024      Lab Results   Component Value Date    GLUCOSE 77 04/01/2024    CALCIUM 9.9 04/01/2024     (L) 04/01/2024    K 4.6 04/01/2024    CO2 21 04/01/2024     04/01/2024    BUN 11 04/01/2024    CREATININE 0.92 04/01/2024         CrCl cannot be calculated (Patient's most recent lab result is older than the maximum 3 days allowed.).  CRP   Date/Time Value Ref Range Status   01/08/2021 11:26 AM 0.4 0 - 2.0 MG/DL Final     Comment:     Performed at [address] [address]         Assessment/Plan   Tracey Carranza is a 58 y.o. female on day 0 of admission presenting with Rectal cancer (CMS/HCC).  Principal Problem:    Rectal cancer (CMS/HCC)    POD0 ileostomy closure, Doing well  Findings: no adhesions  Plan:  - abd soft, mildly distended, +BS, prior ileostomy covered with CDI dressing  - VSS, afebrile  - encourage IS, encourage ambulation  - diet: regular, nick BID  - DVT prophylaxis: SCD/lovenox  - Continue current pain control regimen, pain well controlled  - resume home meds    Dispo:  Anticipate discharge in 1 to 2 days.    I spent 35 minutes in the professional and overall care of this patient.         Kathy Simon PA-C            78

## 2024-04-10 NOTE — DISCHARGE INSTRUCTIONS
Instructions After Laparoscopic Surgery    Wound Care:          -Ice packs to wounds every hour the first day    -no baths or swimming for 2 weeks, until seen in office  -keep wound clean and dry, daily dressing changes  -do not apply topical creams or ointments  -call if you notice redness around wound, foul-smelliing drainage, or increasing pain   Diet:          - GI soft low residual as discussed with Dietary          - Impact Advance Shakes 3 times a day for 5 days    Activity          -Take it easy for the first 48 hours          -stairs and walks are fine          -resume activities gradually over the first week          -ask Dr Presley before resuming strenuous physical exertions          -No driving while on pain medication  Medications          -Pain medicine prescription attached for severe pain           -Please take Tylenol 625 mg every 6 hours scheduled for 2-3 days, then every 6 hours as needed          -Resume your home medications unless otherwise directed          - Oxycodone 5mg every 6 hours as needed for pain  Other Instructions          -Call to make appointment within 1-2 days: [phone]          -Call the doctor for the following:   severe unrelieved pain   fevers > 101F   Nausea/vomiting   wound issues   insurance/return to work forms   shortness of breath   chest pains    Other Instructions:  It is important to drink adequate fluid and avoid dehydration. Signs of dehydration include dark urine, decreased frequency in urine, pale mucous membrane, or dry mucous membranes. You should drink at least 8 8oz glasses of fluids a day and it should be a variety of fluids (water, juice, tea, coffee, etc.).  If you start to experience nausea, emesis, fever, or abdominal pain please call Dr. Presley's office [phone].

## 2024-04-15 ENCOUNTER — TELEPHONE (OUTPATIENT)
Dept: SURGERY | Facility: CLINIC | Age: 59
End: 2024-04-15
Payer: MEDICARE

## 2024-04-15 NOTE — TELEPHONE ENCOUNTER
Post op call.  She is s/p 4/09/2024 Ileostomy closure.  She has been taking Oxycodone every four hours. No Tylenol.  She is sore at the stoma closure site.  She had to take Miralax on Friday.  Moved her bowels on Saturday a couple of times.  Stools were loose.  She had no movement on Sunday and none today.  She is passing a lot of gas.  She did take another capful of Miralax today.  Appetite is getting better.  Denies n/v.  Denies fever/chills.  Old stoma site with serosanguinous fluid.  Denies purulent fluid.  We discussed using Tylenol during the day and Oxycodone sparingly as this does not allow your bowels to function normally.  She understands this and agrees.  Otherwise is doing well and normal post op.  She will contact the office if she needs anything.  Zuly Thibodeaux RN

## 2024-04-17 LAB
LABORATORY COMMENT REPORT: NORMAL
PATH REPORT.FINAL DX SPEC: NORMAL
PATH REPORT.GROSS SPEC: NORMAL
PATH REPORT.RELEVANT HX SPEC: NORMAL
PATH REPORT.TOTAL CANCER: NORMAL

## 2024-04-29 ENCOUNTER — APPOINTMENT (OUTPATIENT)
Dept: PRIMARY CARE | Facility: CLINIC | Age: 59
End: 2024-04-29
Payer: MEDICARE

## 2024-04-30 NOTE — PROGRESS NOTES
"Tracey Carranza is a 58 year old female with rectal cancer.  She is s/p She is s/p Robot Assisted Laparoscopic Sigmoid Colon and Rectum Resection, Right Oophrectomy, ileostomy 2/6/24.    A. RIGHT OVARY OOPHORECTOMY:   -- OVARY WITH BENIGN CYST, NO EVIDENCE OF MALIGNANCY     B. RECTUM LOW ANTERIOR RESECTION:      -- INVASIVE ADENOCARCINOMA, MODERATELY DIFFERENTIATED, SEE SYNOPTIC REPORT AND NOTE  -- TWENTY-FIVE LYMPH NODES WITH NO EVIDENCE OF MALIGNANCY (0/25)  --T1,N0      She recovered nicely.  Underwent a negative GGE and presented for stoma closure.   She is suffering with LARS.  She is up and down and having small pellets.  Not taking any metamucil.    She is getting some zingers that come and go in minutes.        4/09/2024 Operation:  Ileostomy Closure  A.  ILEOSTOMY (STOMA):  PORTION OF SMALL INTESTINE WITH FEATURES CONSISTENT WITH ILEOSTOMY TAKEDOWN.     Visit Vitals  /75   Pulse 80   Temp 36.2 °C (97.2 °F)   Ht 1.651 m (5' 5\")   Wt 90.7 kg (200 lb)   SpO2 99%   BMI 33.28 kg/m²   OB Status Postmenopausal   Smoking Status Every Day   BSA 2.04 m²           Review of Systems  Constitutional: Negative for fever, chills, anorexia, weight loss, malaise     ENMT: Negative for nasal discharge, congestion, ear pain, mouth pain, throat pain     Respiratory: Negative for cough, hemoptysis, wheezing, shortness of breath     Cardiac: Negative for chest pain, dyspnea on exertion, orthopnea, palpitations, syncope, (+)HLD     Gastrointestinal: Negative for nausea, vomiting, diarrhea, constipation, abdominal pain, (+)RECTAL CANCER, S/P LAR, (+)URBANO'S ESOPHAGUS    Genitourinary: Negative for discharge, dysuria, flank pain, frequency, hematuria     Musculoskeletal: Negative for decreased ROM, pain, swelling, weakness, (+)Rheumatoid arthritis-Methotrexate     Neurological: Negative for dizziness, confusion, headache, seizures, syncope     Psychiatric: Negative for mood changes, anxiety, hallucinations, sleep changes, " suicidal ideas     Skin: Negative for mass, pain, itching, rash, ulcer     Endocrine: Negative for heat intolerance, cold intolerance, excessive sweating, polyuria, excess thirst, (+)HYPOTHYROIDISM     Hematologic/Lymph: Negative for anemia, bruising, easy bleeding, night sweats, petechiae, history of DVT/PE or cancer     Allergic/Immunologic: Negative for anaphylaxis, itchy/ teary eyes, itching, sneezing, swelling      Physical Exam  Constitutional: Well developed, awake/alert/oriented x3, no distress, alert and cooperative             Eyes: Sclera anicteric, no conjunctival inflammation, conjugate gaze    ENMT: mucous membranes moist, no apparent injury,            Head/Neck: Neck supple, no apparent injury, No JVD, trachea midline, no bruits              Respiratory/Thorax: Patent airways, CTAB, normal breath sounds with good chest expansion, thorax symmetric         Cardiovascular: Regular, rate and rhythm, no murmurs, normal S1 and S2         Gastrointestinal: Nondistended, soft, non-tender, no rebound tenderness or guarding, no masses palpable, no organomegaly, +BS, no bruits               Extremities: normal extremities, no cyanosis edema, contusions or wounds, 2+ femoral pulses B/L              Neurological: alert and oriented x3, normal strength, Normal gait          Lymphatic: No palpable inguinal lymphadenopathy   Psychological: Appropriate mood and behavior         Skin: Warm and dry, no lesions, no rashes                Anorectal:  Anastomosis wide open, mild perianal skin irritation.     Impression:  T1N0 rectal cancer  S/p Robot Assisted Laparoscopic Sigmoid Colon and Rectum Resection, Right Oophrectomy, ileostomy 2/6/24    Plan:    CEA checks every 6 months   Colonoscopy in December LARS discussed starting metamucil   Calmoseptine

## 2024-05-21 ENCOUNTER — APPOINTMENT (OUTPATIENT)
Dept: PRIMARY CARE | Facility: CLINIC | Age: 59
End: 2024-05-21
Payer: MEDICARE

## 2024-05-22 ENCOUNTER — OFFICE VISIT (OUTPATIENT)
Dept: SURGERY | Facility: CLINIC | Age: 59
End: 2024-05-22
Payer: MEDICARE

## 2024-05-22 VITALS
WEIGHT: 200 LBS | HEART RATE: 80 BPM | OXYGEN SATURATION: 99 % | SYSTOLIC BLOOD PRESSURE: 138 MMHG | HEIGHT: 65 IN | DIASTOLIC BLOOD PRESSURE: 75 MMHG | BODY MASS INDEX: 33.32 KG/M2 | TEMPERATURE: 97.2 F

## 2024-05-22 DIAGNOSIS — C20 RECTAL CANCER (MULTI): ICD-10-CM

## 2024-05-22 DIAGNOSIS — C20 RECTAL CANCER (MULTI): Primary | ICD-10-CM

## 2024-05-22 PROCEDURE — 3078F DIAST BP <80 MM HG: CPT | Performed by: COLON & RECTAL SURGERY

## 2024-05-22 PROCEDURE — 3008F BODY MASS INDEX DOCD: CPT | Performed by: COLON & RECTAL SURGERY

## 2024-05-22 PROCEDURE — 3075F SYST BP GE 130 - 139MM HG: CPT | Performed by: COLON & RECTAL SURGERY

## 2024-05-22 PROCEDURE — 99024 POSTOP FOLLOW-UP VISIT: CPT | Performed by: COLON & RECTAL SURGERY

## 2024-05-22 RX ORDER — POLYETHYLENE GLYCOL-3350 AND ELECTROLYTES 236; 6.74; 5.86; 2.97; 22.74 G/274.31G; G/274.31G; G/274.31G; G/274.31G; G/274.31G
POWDER, FOR SOLUTION ORAL
Qty: 4000 ML | Refills: 0 | Status: SHIPPED | OUTPATIENT
Start: 2024-05-22 | End: 2024-06-11 | Stop reason: WASHOUT

## 2024-05-22 ASSESSMENT — PAIN SCALES - GENERAL: PAINLEVEL: 0-NO PAIN

## 2024-05-22 ASSESSMENT — ENCOUNTER SYMPTOMS: DEPRESSION: 1

## 2024-06-11 ENCOUNTER — OFFICE VISIT (OUTPATIENT)
Dept: PRIMARY CARE | Facility: CLINIC | Age: 59
End: 2024-06-11
Payer: MEDICARE

## 2024-06-11 VITALS
HEART RATE: 83 BPM | OXYGEN SATURATION: 96 % | SYSTOLIC BLOOD PRESSURE: 127 MMHG | BODY MASS INDEX: 33.49 KG/M2 | WEIGHT: 201 LBS | HEIGHT: 65 IN | DIASTOLIC BLOOD PRESSURE: 74 MMHG

## 2024-06-11 DIAGNOSIS — E78.00 PURE HYPERCHOLESTEROLEMIA: Primary | ICD-10-CM

## 2024-06-11 DIAGNOSIS — Z00.00 HEALTHCARE MAINTENANCE: ICD-10-CM

## 2024-06-11 DIAGNOSIS — E03.9 HYPOTHYROIDISM, UNSPECIFIED TYPE: ICD-10-CM

## 2024-06-11 DIAGNOSIS — F51.02 ADJUSTMENT INSOMNIA: ICD-10-CM

## 2024-06-11 DIAGNOSIS — E78.5 HYPERLIPIDEMIA, UNSPECIFIED HYPERLIPIDEMIA TYPE: ICD-10-CM

## 2024-06-11 DIAGNOSIS — K21.9 GASTROESOPHAGEAL REFLUX DISEASE WITHOUT ESOPHAGITIS: ICD-10-CM

## 2024-06-11 DIAGNOSIS — C20 RECTAL ADENOCARCINOMA (MULTI): ICD-10-CM

## 2024-06-11 RX ORDER — PREGABALIN 25 MG/1
25 CAPSULE ORAL 2 TIMES DAILY
COMMUNITY

## 2024-06-11 RX ORDER — MIRTAZAPINE 15 MG/1
15 TABLET, FILM COATED ORAL NIGHTLY
Qty: 90 TABLET | Refills: 1 | Status: SHIPPED | OUTPATIENT
Start: 2024-06-11 | End: 2025-06-11

## 2024-06-11 RX ORDER — PANTOPRAZOLE SODIUM 40 MG/1
40 TABLET, DELAYED RELEASE ORAL DAILY
Qty: 90 TABLET | Refills: 1 | Status: SHIPPED | OUTPATIENT
Start: 2024-06-11

## 2024-06-11 RX ORDER — ATORVASTATIN CALCIUM 40 MG/1
40 TABLET, FILM COATED ORAL DAILY
Qty: 90 TABLET | Refills: 1 | Status: SHIPPED | OUTPATIENT
Start: 2024-06-11 | End: 2024-12-08

## 2024-06-11 ASSESSMENT — PATIENT HEALTH QUESTIONNAIRE - PHQ9
4. FEELING TIRED OR HAVING LITTLE ENERGY: NEARLY EVERY DAY
9. THOUGHTS THAT YOU WOULD BE BETTER OFF DEAD, OR OF HURTING YOURSELF: NOT AT ALL
8. MOVING OR SPEAKING SO SLOWLY THAT OTHER PEOPLE COULD HAVE NOTICED. OR THE OPPOSITE, BEING SO FIGETY OR RESTLESS THAT YOU HAVE BEEN MOVING AROUND A LOT MORE THAN USUAL: NOT AT ALL
5. POOR APPETITE OR OVEREATING: NOT AT ALL
1. LITTLE INTEREST OR PLEASURE IN DOING THINGS: NOT AT ALL
2. FEELING DOWN, DEPRESSED OR HOPELESS: NOT AT ALL
10. IF YOU CHECKED OFF ANY PROBLEMS, HOW DIFFICULT HAVE THESE PROBLEMS MADE IT FOR YOU TO DO YOUR WORK, TAKE CARE OF THINGS AT HOME, OR GET ALONG WITH OTHER PEOPLE: SOMEWHAT DIFFICULT
2. FEELING DOWN, DEPRESSED OR HOPELESS: NEARLY EVERY DAY
SUM OF ALL RESPONSES TO PHQ9 QUESTIONS 1 AND 2: 6
3. TROUBLE FALLING OR STAYING ASLEEP OR SLEEPING TOO MUCH: NEARLY EVERY DAY
SUM OF ALL RESPONSES TO PHQ9 QUESTIONS 1 AND 2: 0
7. TROUBLE CONCENTRATING ON THINGS, SUCH AS READING THE NEWSPAPER OR WATCHING TELEVISION: NOT AT ALL
6. FEELING BAD ABOUT YOURSELF - OR THAT YOU ARE A FAILURE OR HAVE LET YOURSELF OR YOUR FAMILY DOWN: NOT AT ALL
SUM OF ALL RESPONSES TO PHQ QUESTIONS 1-9: 12
1. LITTLE INTEREST OR PLEASURE IN DOING THINGS: NEARLY EVERY DAY

## 2024-06-11 ASSESSMENT — PAIN SCALES - GENERAL: PAINLEVEL: 0-NO PAIN

## 2024-06-11 NOTE — PROGRESS NOTES
Subjective   Patient ID: Tracey Carranza is a 58 y.o. female who presents for Med Refill.    HPI patient presents to clinic for follow-up visit on history of  rheumatoid arthritis, hyperlipidemia, lung nodules, coronary atherosclerosis, hypothyroidism, insomnia, nicotine dependence, adenocarcinoma of the rectum vitamin D deficiency .status post robotic assisted laparoscopic sigmoid colon and rectum resection with right oophorectomy on 2/6/2024 by Dr. Presley secondary to adenocarcinoma of the rectum, status post ileostomy closure on 4/9/2024, and dyslipidemia.  She is doing fairly well check and                                                                                                                                                                                                                                                                                                                                                                                                                                                                                                                                                                                                                                                                                                                                                                                                                                                                                                                              she is requesting increase dose of Remeron because of insomnia.  And denies any abdominal pain, shortness of breath, cough congestion and nausea vomiting.     Review of Systems   Constitutional: Negative.    HENT: Negative.     Eyes: Negative.    Respiratory: Negative.     Cardiovascular: Negative.    Gastrointestinal: Negative.    Endocrine: Negative.    Genitourinary: Negative.    Musculoskeletal: Negative.    Skin: Negative.   "  Allergic/Immunologic: Negative.    Neurological: Negative.    Hematological: Negative.    Psychiatric/Behavioral:  Positive for sleep disturbance.        Objective   /74 (BP Location: Right arm)   Pulse 83   Ht 1.651 m (5' 5\")   Wt 91.2 kg (201 lb)   SpO2 96%   BMI 33.45 kg/m²     Physical Exam  Constitutional:       Appearance: Normal appearance. She is obese.   HENT:      Right Ear: Tympanic membrane normal.      Left Ear: Tympanic membrane and ear canal normal.      Nose: Nose normal.   Neck:      Vascular: No carotid bruit.   Cardiovascular:      Rate and Rhythm: Normal rate.   Pulmonary:      Effort: No respiratory distress.      Breath sounds: No stridor. No wheezing.   Abdominal:      Palpations: Abdomen is soft.      Tenderness: There is no abdominal tenderness. There is no guarding or rebound.   Skin:     Coloration: Skin is not jaundiced.      Findings: No bruising.   Neurological:      General: No focal deficit present.      Mental Status: She is alert and oriented to person, place, and time.   Psychiatric:         Mood and Affect: Mood normal.         Assessment/Plan   Patient is advised to increase remeron to 15 mg daily regarding insomania.She will continue atorvastatin regarding dyslipidemia.She will continue other medications and will return to clinic in 3 months for follow up visit.       "

## 2024-06-14 ASSESSMENT — ENCOUNTER SYMPTOMS
HEMATOLOGIC/LYMPHATIC NEGATIVE: 1
CARDIOVASCULAR NEGATIVE: 1
RESPIRATORY NEGATIVE: 1
EYES NEGATIVE: 1
GASTROINTESTINAL NEGATIVE: 1
ENDOCRINE NEGATIVE: 1
SLEEP DISTURBANCE: 1
MUSCULOSKELETAL NEGATIVE: 1
NEUROLOGICAL NEGATIVE: 1
CONSTITUTIONAL NEGATIVE: 1
ALLERGIC/IMMUNOLOGIC NEGATIVE: 1

## 2024-07-09 ENCOUNTER — APPOINTMENT (OUTPATIENT)
Dept: PRIMARY CARE | Facility: CLINIC | Age: 59
End: 2024-07-09
Payer: MEDICARE

## 2024-07-09 NOTE — SIGNIFICANT EVENT
58 year old with PMH of rectal cancer who is POD#3 for Robot Assisted Laparoscopic Sigmoid Colon and Rectum Resection, Right Oophrectomy with Dr. Presley.    Had a fall during this afternoon, CTH was ordered and results indicates No evidence of acute cortical infarct or intracranial hemorrhage. Continue to encourage ambulation safely and assist patient to the bathroom.     LUCY Magdaleno  [facility]   Available via [employer]     Detail Level: Zone Include Location In Plan?: No

## 2024-10-03 ENCOUNTER — LAB (OUTPATIENT)
Dept: LAB | Facility: LAB | Age: 59
End: 2024-10-03
Payer: MEDICARE

## 2024-10-03 ENCOUNTER — APPOINTMENT (OUTPATIENT)
Dept: PRIMARY CARE | Facility: CLINIC | Age: 59
End: 2024-10-03
Payer: MEDICARE

## 2024-10-03 VITALS
DIASTOLIC BLOOD PRESSURE: 60 MMHG | HEART RATE: 74 BPM | OXYGEN SATURATION: 99 % | HEIGHT: 65 IN | SYSTOLIC BLOOD PRESSURE: 124 MMHG | BODY MASS INDEX: 33.82 KG/M2 | WEIGHT: 203 LBS

## 2024-10-03 DIAGNOSIS — F17.200 CURRENT SMOKER: ICD-10-CM

## 2024-10-03 DIAGNOSIS — Z00.00 WELL ADULT EXAM: Primary | ICD-10-CM

## 2024-10-03 DIAGNOSIS — K21.00 GASTROESOPHAGEAL REFLUX DISEASE WITH ESOPHAGITIS WITHOUT HEMORRHAGE: ICD-10-CM

## 2024-10-03 DIAGNOSIS — F33.1 MODERATE EPISODE OF RECURRENT MAJOR DEPRESSIVE DISORDER: ICD-10-CM

## 2024-10-03 DIAGNOSIS — F51.01 PRIMARY INSOMNIA: ICD-10-CM

## 2024-10-03 DIAGNOSIS — R73.01 IFG (IMPAIRED FASTING GLUCOSE): ICD-10-CM

## 2024-10-03 DIAGNOSIS — C20 RECTAL ADENOCARCINOMA (MULTI): ICD-10-CM

## 2024-10-03 DIAGNOSIS — Z12.31 ENCOUNTER FOR SCREENING MAMMOGRAM FOR BREAST CANCER: ICD-10-CM

## 2024-10-03 DIAGNOSIS — E78.00 PURE HYPERCHOLESTEROLEMIA: ICD-10-CM

## 2024-10-03 DIAGNOSIS — E03.9 ACQUIRED HYPOTHYROIDISM: ICD-10-CM

## 2024-10-03 DIAGNOSIS — M05.79 RHEUMATOID ARTHRITIS INVOLVING MULTIPLE SITES WITH POSITIVE RHEUMATOID FACTOR (MULTI): ICD-10-CM

## 2024-10-03 DIAGNOSIS — I10 ESSENTIAL HYPERTENSION: ICD-10-CM

## 2024-10-03 LAB
ALBUMIN SERPL BCP-MCNC: 4 G/DL (ref 3.4–5)
ALP SERPL-CCNC: 102 U/L (ref 33–136)
ALT SERPL W P-5'-P-CCNC: 13 U/L (ref 7–45)
ANION GAP SERPL CALCULATED.3IONS-SCNC: 10 MMOL/L (ref 10–20)
APPEARANCE UR: CLEAR
AST SERPL W P-5'-P-CCNC: 13 U/L (ref 9–39)
BASOPHILS # BLD AUTO: 0.09 X10*3/UL (ref 0–0.1)
BASOPHILS NFR BLD AUTO: 1.2 %
BILIRUB SERPL-MCNC: 0.4 MG/DL (ref 0–1.2)
BILIRUB UR STRIP.AUTO-MCNC: NEGATIVE MG/DL
BUN SERPL-MCNC: 9 MG/DL (ref 6–23)
CALCIUM SERPL-MCNC: 9.4 MG/DL (ref 8.6–10.3)
CHLORIDE SERPL-SCNC: 106 MMOL/L (ref 98–107)
CHOLEST SERPL-MCNC: 171 MG/DL (ref 0–199)
CHOLEST/HDLC SERPL: 4.3 {RATIO}
CO2 SERPL-SCNC: 27 MMOL/L (ref 21–32)
COLOR UR: COLORLESS
CREAT SERPL-MCNC: 0.83 MG/DL (ref 0.5–1.05)
CREAT UR-MCNC: 55.5 MG/DL (ref 20–320)
EGFRCR SERPLBLD CKD-EPI 2021: 81 ML/MIN/1.73M*2
EOSINOPHIL # BLD AUTO: 0.12 X10*3/UL (ref 0–0.7)
EOSINOPHIL NFR BLD AUTO: 1.6 %
ERYTHROCYTE [DISTWIDTH] IN BLOOD BY AUTOMATED COUNT: 17.8 % (ref 11.5–14.5)
EST. AVERAGE GLUCOSE BLD GHB EST-MCNC: 120 MG/DL
GLUCOSE SERPL-MCNC: 105 MG/DL (ref 74–99)
GLUCOSE UR STRIP.AUTO-MCNC: NORMAL MG/DL
HBA1C MFR BLD: 5.8 %
HCT VFR BLD AUTO: 38.6 % (ref 36–46)
HDLC SERPL-MCNC: 40.2 MG/DL
HGB BLD-MCNC: 12.2 G/DL (ref 12–16)
IMM GRANULOCYTES # BLD AUTO: 0.02 X10*3/UL (ref 0–0.7)
IMM GRANULOCYTES NFR BLD AUTO: 0.3 % (ref 0–0.9)
KETONES UR STRIP.AUTO-MCNC: NEGATIVE MG/DL
LDLC SERPL CALC-MCNC: 106 MG/DL
LEUKOCYTE ESTERASE UR QL STRIP.AUTO: NEGATIVE
LYMPHOCYTES # BLD AUTO: 1.5 X10*3/UL (ref 1.2–4.8)
LYMPHOCYTES NFR BLD AUTO: 20.5 %
MCH RBC QN AUTO: 29.1 PG (ref 26–34)
MCHC RBC AUTO-ENTMCNC: 31.6 G/DL (ref 32–36)
MCV RBC AUTO: 92 FL (ref 80–100)
MICROALBUMIN UR-MCNC: <7 MG/L
MICROALBUMIN/CREAT UR: NORMAL MG/G{CREAT}
MONOCYTES # BLD AUTO: 0.67 X10*3/UL (ref 0.1–1)
MONOCYTES NFR BLD AUTO: 9.2 %
NEUTROPHILS # BLD AUTO: 4.9 X10*3/UL (ref 1.2–7.7)
NEUTROPHILS NFR BLD AUTO: 67.2 %
NITRITE UR QL STRIP.AUTO: NEGATIVE
NON HDL CHOLESTEROL: 131 MG/DL (ref 0–149)
NRBC BLD-RTO: 0 /100 WBCS (ref 0–0)
PH UR STRIP.AUTO: 5.5 [PH]
PLATELET # BLD AUTO: 420 X10*3/UL (ref 150–450)
POTASSIUM SERPL-SCNC: 4.3 MMOL/L (ref 3.5–5.3)
PROT SERPL-MCNC: 6.8 G/DL (ref 6.4–8.2)
PROT UR STRIP.AUTO-MCNC: NEGATIVE MG/DL
RBC # BLD AUTO: 4.19 X10*6/UL (ref 4–5.2)
RBC # UR STRIP.AUTO: NEGATIVE /UL
SODIUM SERPL-SCNC: 139 MMOL/L (ref 136–145)
SP GR UR STRIP.AUTO: 1.01
TRIGL SERPL-MCNC: 123 MG/DL (ref 0–149)
TSH SERPL-ACNC: 1.24 MIU/L (ref 0.44–3.98)
UROBILINOGEN UR STRIP.AUTO-MCNC: NORMAL MG/DL
VLDL: 25 MG/DL (ref 0–40)
WBC # BLD AUTO: 7.3 X10*3/UL (ref 4.4–11.3)

## 2024-10-03 PROCEDURE — 3008F BODY MASS INDEX DOCD: CPT | Performed by: FAMILY MEDICINE

## 2024-10-03 PROCEDURE — 82570 ASSAY OF URINE CREATININE: CPT

## 2024-10-03 PROCEDURE — 84443 ASSAY THYROID STIM HORMONE: CPT

## 2024-10-03 PROCEDURE — G0439 PPPS, SUBSEQ VISIT: HCPCS | Performed by: FAMILY MEDICINE

## 2024-10-03 PROCEDURE — 36415 COLL VENOUS BLD VENIPUNCTURE: CPT

## 2024-10-03 PROCEDURE — 80061 LIPID PANEL: CPT

## 2024-10-03 PROCEDURE — 81003 URINALYSIS AUTO W/O SCOPE: CPT

## 2024-10-03 PROCEDURE — 99213 OFFICE O/P EST LOW 20 MIN: CPT | Performed by: FAMILY MEDICINE

## 2024-10-03 PROCEDURE — 99396 PREV VISIT EST AGE 40-64: CPT | Performed by: FAMILY MEDICINE

## 2024-10-03 PROCEDURE — 85025 COMPLETE CBC W/AUTO DIFF WBC: CPT

## 2024-10-03 PROCEDURE — 82043 UR ALBUMIN QUANTITATIVE: CPT

## 2024-10-03 PROCEDURE — 3074F SYST BP LT 130 MM HG: CPT | Performed by: FAMILY MEDICINE

## 2024-10-03 PROCEDURE — 83036 HEMOGLOBIN GLYCOSYLATED A1C: CPT

## 2024-10-03 PROCEDURE — 3078F DIAST BP <80 MM HG: CPT | Performed by: FAMILY MEDICINE

## 2024-10-03 PROCEDURE — 80053 COMPREHEN METABOLIC PANEL: CPT

## 2024-10-03 RX ORDER — ZOLPIDEM TARTRATE 5 MG/1
5 TABLET ORAL NIGHTLY PRN
Qty: 15 TABLET | Refills: 0 | Status: SHIPPED | OUTPATIENT
Start: 2024-10-03 | End: 2024-11-02

## 2024-10-03 RX ORDER — PREGABALIN 75 MG/1
1 CAPSULE ORAL
COMMUNITY
Start: 2024-09-09

## 2024-10-03 ASSESSMENT — PATIENT HEALTH QUESTIONNAIRE - PHQ9
6. FEELING BAD ABOUT YOURSELF - OR THAT YOU ARE A FAILURE OR HAVE LET YOURSELF OR YOUR FAMILY DOWN: SEVERAL DAYS
2. FEELING DOWN, DEPRESSED OR HOPELESS: NEARLY EVERY DAY
8. MOVING OR SPEAKING SO SLOWLY THAT OTHER PEOPLE COULD HAVE NOTICED. OR THE OPPOSITE, BEING SO FIGETY OR RESTLESS THAT YOU HAVE BEEN MOVING AROUND A LOT MORE THAN USUAL: SEVERAL DAYS
9. THOUGHTS THAT YOU WOULD BE BETTER OFF DEAD, OR OF HURTING YOURSELF: NOT AT ALL
2. FEELING DOWN, DEPRESSED OR HOPELESS: NEARLY EVERY DAY
SUM OF ALL RESPONSES TO PHQ9 QUESTIONS 1 AND 2: 6
SUM OF ALL RESPONSES TO PHQ9 QUESTIONS 1 AND 2: 6
5. POOR APPETITE OR OVEREATING: NEARLY EVERY DAY
3. TROUBLE FALLING OR STAYING ASLEEP OR SLEEPING TOO MUCH: NEARLY EVERY DAY
10. IF YOU CHECKED OFF ANY PROBLEMS, HOW DIFFICULT HAVE THESE PROBLEMS MADE IT FOR YOU TO DO YOUR WORK, TAKE CARE OF THINGS AT HOME, OR GET ALONG WITH OTHER PEOPLE: SOMEWHAT DIFFICULT
SUM OF ALL RESPONSES TO PHQ QUESTIONS 1-9: 18
7. TROUBLE CONCENTRATING ON THINGS, SUCH AS READING THE NEWSPAPER OR WATCHING TELEVISION: SEVERAL DAYS
1. LITTLE INTEREST OR PLEASURE IN DOING THINGS: NEARLY EVERY DAY
1. LITTLE INTEREST OR PLEASURE IN DOING THINGS: NEARLY EVERY DAY
4. FEELING TIRED OR HAVING LITTLE ENERGY: NEARLY EVERY DAY

## 2024-10-03 ASSESSMENT — ENCOUNTER SYMPTOMS
BLOOD IN STOOL: 0
DYSPHORIC MOOD: 0
WEAKNESS: 0
NUMBNESS: 0
VOMITING: 0
CHILLS: 0
ABDOMINAL PAIN: 0
MYALGIAS: 0
ARTHRALGIAS: 0
TROUBLE SWALLOWING: 0
HEMATURIA: 0
SHORTNESS OF BREATH: 0
NERVOUS/ANXIOUS: 0
DIZZINESS: 0
SORE THROAT: 0
NAUSEA: 0
UNEXPECTED WEIGHT CHANGE: 0
RHINORRHEA: 0
COUGH: 0
DYSURIA: 0
FEVER: 0

## 2024-10-03 ASSESSMENT — ACTIVITIES OF DAILY LIVING (ADL)
DOING_HOUSEWORK: INDEPENDENT
DRESSING: INDEPENDENT
MANAGING_FINANCES: INDEPENDENT
TAKING_MEDICATION: INDEPENDENT
BATHING: INDEPENDENT
GROCERY_SHOPPING: INDEPENDENT

## 2024-10-03 ASSESSMENT — PAIN SCALES - GENERAL: PAINLEVEL: 0-NO PAIN

## 2024-10-03 NOTE — PROGRESS NOTES
Subjective   Reason for Visit: Tracey Carranza is an 59 y.o. female here for a Medicare Wellness visit.     Past Medical, Surgical, and Family History reviewed and updated in chart.    Reviewed all medications by prescribing practitioner or clinical pharmacist (such as prescriptions, OTCs, herbal therapies and supplements) and documented in the medical record.    [facility]    Patient Care Team:  Vickie Sweet MD as PCP - General (Family Medicine)  Kg Chacon MD as PCP - [employer]age PCP  Kg Chacon MD (Internal Medicine)  Mirella Burton MD as Consulting Physician (Hematology and Oncology)   Tracey Carranza is seen for comprehensive physical exam.  PMH, PSH, family history and social history were reviewed and updated.  GYN - no abnormal Pap test, not sexually active, does not want testing done  RA - sees Dr. Preston at [facility], taking lyrica and methotrexate  Hypercholesterolemia - taking atorvastatin daily  Hypothyroidism - taking levothyroxine on empty stomach  Insomnia - has tried mirtazepine, elavil and trazodone without relief at all,   Colon Ca - 2/6/24 Lap sigmoid colectomy w/ rt oophorectomy, ileostomy reversal 4/9/24 by Dr. Presley,   Mammogram - doesn't want at this done    Review of Systems   Constitutional:  Negative for chills, fever and unexpected weight change.   HENT:  Negative for congestion, ear pain, hearing loss, rhinorrhea, sore throat and trouble swallowing.    Eyes:  Negative for visual disturbance.   Respiratory:  Negative for cough and shortness of breath.    Cardiovascular:  Negative for chest pain and leg swelling.   Gastrointestinal:  Negative for abdominal pain, blood in stool, nausea and vomiting.   Genitourinary:  Negative for dysuria, hematuria, vaginal bleeding and vaginal discharge.   Musculoskeletal:  Negative for arthralgias and myalgias.   Skin:  Negative for rash.   Neurological:  Negative for dizziness, weakness and numbness.   Psychiatric/Behavioral:  " Negative for dysphoric mood. The patient is not nervous/anxious.        Objective   Vitals:  /60   Pulse 74   Ht 1.651 m (5' 5\")   Wt 92.1 kg (203 lb)   SpO2 99%   BMI 33.78 kg/m²       Physical Exam  Vitals and nursing note reviewed.   Constitutional:       General: She is not in acute distress.  HENT:      Right Ear: Tympanic membrane and ear canal normal.      Left Ear: Tympanic membrane and ear canal normal.      Nose: Nose normal.      Mouth/Throat:      Mouth: Mucous membranes are moist.   Eyes:      Extraocular Movements: Extraocular movements intact.      Pupils: Pupils are equal, round, and reactive to light.   Neck:      Vascular: No carotid bruit.   Cardiovascular:      Rate and Rhythm: Normal rate and regular rhythm.   Pulmonary:      Effort: Pulmonary effort is normal.      Breath sounds: Normal breath sounds.   Abdominal:      General: Abdomen is flat.      Palpations: Abdomen is soft.      Tenderness: There is no abdominal tenderness.   Musculoskeletal:         General: Normal range of motion.   Lymphadenopathy:      Cervical: No cervical adenopathy.   Skin:     General: Skin is warm.      Findings: No rash.   Neurological:      General: No focal deficit present.      Mental Status: She is alert.   Psychiatric:         Mood and Affect: Mood normal.         Assessment/Plan   Assessment & Plan  Well adult exam  Preventative measures discussed in detail.  Does not wish to have mammogram at this time.  Immunizations reviewed and discussed.  Reviewed lab orders with patient.         Essential hypertension  Controlled without any current medications. DASH diet. Exercise at least 4 times per week.     Orders:    CBC and Auto Differential; Future    Comprehensive Metabolic Panel; Future    Albumin-Creatinine Ratio, Urine Random; Future    Urinalysis with Reflex Microscopic; Future    Pure hypercholesterolemia  Await labs.  Continue taking atorvastatin 40 mg daily.  Orders:    CBC and Auto " Differential; Future    Comprehensive Metabolic Panel; Future    Lipid Panel; Future    Hemoglobin A1C; Future    Acquired hypothyroidism  Await TSH.  Continue taking levothyroxine on an empty stomach.  Orders:    TSH with reflex to Free T4 if abnormal; Future    Hemoglobin A1C; Future    Gastroesophageal reflux disease with esophagitis without hemorrhage  Continue with pantoprazole 40 mg daily.  Avoid dietary triggers.       Rectal adenocarcinoma (Multi)  Continue follow-up with Dr. Presley       Moderate episode of recurrent major depressive disorder  Doing well not on any medication at this time.  Good support system.       Rheumatoid arthritis involving multiple sites with positive rheumatoid factor (Multi)  Continue follow-up with rheumatologist       Primary insomnia  Good sleep hygiene discussed.  Begin Ambien 5 mg nightly as needed when she has not slept 2-3 nights in a row.  Discussed the potential habit-forming issues with this medication.  Orders:    zolpidem (Ambien) 5 mg tablet; Take 1 tablet (5 mg) by mouth as needed at bedtime for sleep.    Current smoker  CT chest done January 2024.  Advised to quit.       IFG (impaired fasting glucose)  A1c is ordered.  Continue to reduce simple sugar intake  Orders:    Hemoglobin A1C; Future        Follow up 6 MONTHS, sooner with any problems or concerns.

## 2024-10-10 NOTE — ASSESSMENT & PLAN NOTE
Await labs.  Continue taking atorvastatin 40 mg daily.  Orders:    CBC and Auto Differential; Future    Comprehensive Metabolic Panel; Future    Lipid Panel; Future    Hemoglobin A1C; Future

## 2024-10-10 NOTE — ASSESSMENT & PLAN NOTE
Good sleep hygiene discussed.  Begin Ambien 5 mg nightly as needed when she has not slept 2-3 nights in a row.  Discussed the potential habit-forming issues with this medication.  Orders:    zolpidem (Ambien) 5 mg tablet; Take 1 tablet (5 mg) by mouth as needed at bedtime for sleep.

## 2024-10-10 NOTE — ASSESSMENT & PLAN NOTE
Await TSH.  Continue taking levothyroxine on an empty stomach.  Orders:    TSH with reflex to Free T4 if abnormal; Future    Hemoglobin A1C; Future

## 2024-10-10 NOTE — ASSESSMENT & PLAN NOTE
Controlled without any current medications. DASH diet. Exercise at least 4 times per week.     Orders:    CBC and Auto Differential; Future    Comprehensive Metabolic Panel; Future    Albumin-Creatinine Ratio, Urine Random; Future    Urinalysis with Reflex Microscopic; Future

## 2024-10-22 ENCOUNTER — APPOINTMENT (OUTPATIENT)
Dept: PRIMARY CARE | Facility: CLINIC | Age: 59
End: 2024-10-22
Payer: MEDICARE

## 2024-10-22 DIAGNOSIS — F51.01 PRIMARY INSOMNIA: ICD-10-CM

## 2024-10-22 RX ORDER — ZOLPIDEM TARTRATE 5 MG/1
5 TABLET ORAL NIGHTLY PRN
Qty: 15 TABLET | Refills: 0 | OUTPATIENT
Start: 2024-10-22

## 2024-10-29 NOTE — PROGRESS NOTES
Subjective   Patient ID: Tracey Carranza is a 57 y.o. female who presents for Establish Care (New pt).  HPI    Np   Ra   Hypothyroid, hld and gerd.   She is having sleeping issues.   She  says  melatonin  never worked   Review of Systems   Constitutional: Negative.    HENT: Negative.     Eyes: Negative.    Respiratory: Negative.     Cardiovascular: Negative.    Gastrointestinal: Negative.    Endocrine: Negative.    Musculoskeletal: Negative.    Skin: Negative.    Neurological: Negative.    Hematological: Negative.    Psychiatric/Behavioral: Negative.     All other systems reviewed and are negative.      Objective   Physical Exam  Vitals and nursing note reviewed.   Constitutional:       Appearance: Normal appearance.   HENT:      Head: Normocephalic and atraumatic.      Nose: Nose normal.   Eyes:      Conjunctiva/sclera: Conjunctivae normal.   Cardiovascular:      Rate and Rhythm: Normal rate and regular rhythm.   Pulmonary:      Effort: Pulmonary effort is normal.   Skin:     General: Skin is dry.   Neurological:      General: No focal deficit present.      Mental Status: She is alert and oriented to person, place, and time. Mental status is at baseline.   Psychiatric:         Mood and Affect: Mood normal.         Behavior: Behavior normal.         Assessment/Plan   Problem List Items Addressed This Visit          Medium    RA (rheumatoid arthritis) (CMS/[facility]) - Primary     Other Visit Diagnoses       Healthcare maintenance        Relevant Medications    pantoprazole (Protonix) 40 mg EC tablet    cholecalciferol (Vitamin D3) 50 MCG (2000 UT) tablet    amitriptyline (Elavil) 10 mg tablet    Other Relevant Orders    CBC    Comprehensive Metabolic Panel    Lipid Panel    Hypothyroidism, unspecified type        Relevant Medications    atorvastatin (Lipitor) 40 mg tablet    levothyroxine (Synthroid, Levoxyl) 88 mcg tablet    pantoprazole (Protonix) 40 mg EC tablet    Other Relevant Orders    Thyroxine, Free    Encounter  for vitamin deficiency screening        Relevant Orders    Vitamin D 1,25 Dihydroxy    Screening for cardiovascular condition        Relevant Orders    Lipid Panel    Encounter for screening mammogram for malignant neoplasm of breast        Autoinflammatory syndrome, unspecified (CMS/HCC)        Relevant Orders    CBC    Hyperlipidemia, unspecified hyperlipidemia type        Relevant Medications    atorvastatin (Lipitor) 40 mg tablet    levothyroxine (Synthroid, Levoxyl) 88 mcg tablet             Np  estPatient was identified as a fall risk. Risk prevention instructions provided.   yes

## 2024-11-27 DIAGNOSIS — C20 RECTAL ADENOCARCINOMA (MULTI): Primary | ICD-10-CM

## 2024-11-27 RX ORDER — CHOLECALCIFEROL (VITAMIN D3) 25 MCG
2000 TABLET ORAL DAILY
COMMUNITY

## 2024-11-27 RX ORDER — POLYETHYLENE GLYCOL 3350, SODIUM SULFATE ANHYDROUS, SODIUM BICARBONATE, SODIUM CHLORIDE, POTASSIUM CHLORIDE 236; 22.74; 6.74; 5.86; 2.97 G/4L; G/4L; G/4L; G/4L; G/4L
4000 POWDER, FOR SOLUTION ORAL ONCE
Qty: 4000 ML | Refills: 0 | Status: SHIPPED | OUTPATIENT
Start: 2024-11-27 | End: 2024-11-27

## 2024-12-05 ENCOUNTER — APPOINTMENT (OUTPATIENT)
Dept: GASTROENTEROLOGY | Facility: HOSPITAL | Age: 59
End: 2024-12-05
Payer: MEDICARE

## 2025-01-01 NOTE — PROGRESS NOTES
"Subjective   Patient ID: Tracey Carranza is a 59 y.o. female who presents for Med Management.    Tracey Carranza is seen for her chronic issues.    Insomnia -she tried mirtazapine, Elavil and trazodone in the past without relief.  At her last visit in October she was placed on Ambien to take only as needed.  Advised to use only when she has not slept for 2-3 nights in a row. Still has some left from original prescription.  Not using it more than 2-3 times per week and only when she needs it.  [place]ARRS:  Vickie Sweet MD on 1/2/2025 10:25 AM  I have personally reviewed the OARRS report for Tracey Carranza. I have considered the risks of abuse, dependence, addiction and diversion    Is the patient prescribed a combination of a benzodiazepine and opioid?  No    Controlled Substance Agreement:  Date of the Last Agreement: 1/2/24  Reviewed Controlled Substance Agreement including but not limited to the benefits, risks, and alternatives to treatment with a Controlled Substance medication(s).    Sleep Aids:   What is the patient's goal of therapy? Improve sleep  Is this being achieved with current treatment? yes    Activities of Daily Living:   Is your overall impression that this patient is benefiting (symptom reduction outweighs side effects) from sleep aid therapy? Yes     1. Physical Functioning: Same  2. Family Relationship: Same  3. Social Relationship: Same  4. Mood: Better  5. Sleep Patterns: Better  6. Overall Function: Better      Review of Systems  All other systems have been reviewed and are negative except as noted in the HPI.       Objective  Vitals:  /80   Pulse 84   Ht 1.651 m (5' 5\")   Wt 93.4 kg (206 lb)   SpO2 98%   BMI 34.28 kg/m²     Physical Exam  Alert. NAD. Judgement and insight intact. Mood appropriate    Assessment/Plan   Assessment & Plan  Primary insomnia  Chronic.  Good sleep hygiene discussed.  Ambien as needed.  Indications, benefits and potential side effects " discussed.    Orders:    zolpidem (Ambien) 5 mg tablet; Take 1 tablet (5 mg) by mouth as needed at bedtime for sleep.        Follow up 6 Months, sooner with any problems or concerns.

## 2025-01-02 ENCOUNTER — APPOINTMENT (OUTPATIENT)
Dept: PRIMARY CARE | Facility: CLINIC | Age: 60
End: 2025-01-02
Payer: MEDICARE

## 2025-01-02 VITALS
WEIGHT: 206 LBS | BODY MASS INDEX: 34.32 KG/M2 | DIASTOLIC BLOOD PRESSURE: 80 MMHG | HEART RATE: 84 BPM | HEIGHT: 65 IN | SYSTOLIC BLOOD PRESSURE: 130 MMHG | OXYGEN SATURATION: 98 %

## 2025-01-02 DIAGNOSIS — E03.9 HYPOTHYROIDISM, UNSPECIFIED TYPE: ICD-10-CM

## 2025-01-02 DIAGNOSIS — Z00.00 HEALTHCARE MAINTENANCE: ICD-10-CM

## 2025-01-02 DIAGNOSIS — E55.9 VITAMIN D DEFICIENCY: Primary | ICD-10-CM

## 2025-01-02 DIAGNOSIS — E78.5 HYPERLIPIDEMIA, UNSPECIFIED HYPERLIPIDEMIA TYPE: ICD-10-CM

## 2025-01-02 DIAGNOSIS — K21.9 GASTROESOPHAGEAL REFLUX DISEASE WITHOUT ESOPHAGITIS: ICD-10-CM

## 2025-01-02 DIAGNOSIS — F51.01 PRIMARY INSOMNIA: ICD-10-CM

## 2025-01-02 PROCEDURE — 99213 OFFICE O/P EST LOW 20 MIN: CPT | Performed by: FAMILY MEDICINE

## 2025-01-02 PROCEDURE — 3079F DIAST BP 80-89 MM HG: CPT | Performed by: FAMILY MEDICINE

## 2025-01-02 PROCEDURE — 3008F BODY MASS INDEX DOCD: CPT | Performed by: FAMILY MEDICINE

## 2025-01-02 PROCEDURE — G2211 COMPLEX E/M VISIT ADD ON: HCPCS | Performed by: FAMILY MEDICINE

## 2025-01-02 PROCEDURE — 3075F SYST BP GE 130 - 139MM HG: CPT | Performed by: FAMILY MEDICINE

## 2025-01-02 RX ORDER — CHOLECALCIFEROL (VITAMIN D3) 25 MCG
2000 TABLET ORAL DAILY
Qty: 180 TABLET | Refills: 3 | Status: SHIPPED | OUTPATIENT
Start: 2025-01-02 | End: 2026-01-02

## 2025-01-02 RX ORDER — PANTOPRAZOLE SODIUM 40 MG/1
40 TABLET, DELAYED RELEASE ORAL DAILY
Qty: 90 TABLET | Refills: 3 | Status: SHIPPED | OUTPATIENT
Start: 2025-01-02

## 2025-01-02 RX ORDER — ATORVASTATIN CALCIUM 40 MG/1
40 TABLET, FILM COATED ORAL DAILY
Qty: 90 TABLET | Refills: 3 | Status: SHIPPED | OUTPATIENT
Start: 2025-01-02 | End: 2025-12-28

## 2025-01-02 RX ORDER — ZOLPIDEM TARTRATE 5 MG/1
5 TABLET ORAL NIGHTLY PRN
Qty: 45 TABLET | Refills: 0 | Status: SHIPPED | OUTPATIENT
Start: 2025-01-02 | End: 2025-04-02

## 2025-01-02 RX ORDER — LEVOTHYROXINE SODIUM 88 UG/1
88 TABLET ORAL
Qty: 90 TABLET | Refills: 3 | Status: SHIPPED | OUTPATIENT
Start: 2025-01-02

## 2025-01-02 ASSESSMENT — COLUMBIA-SUICIDE SEVERITY RATING SCALE - C-SSRS: 1. IN THE PAST MONTH, HAVE YOU WISHED YOU WERE DEAD OR WISHED YOU COULD GO TO SLEEP AND NOT WAKE UP?: NO

## 2025-01-02 ASSESSMENT — PATIENT HEALTH QUESTIONNAIRE - PHQ9
2. FEELING DOWN, DEPRESSED OR HOPELESS: NOT AT ALL
SUM OF ALL RESPONSES TO PHQ9 QUESTIONS 1 AND 2: 0
1. LITTLE INTEREST OR PLEASURE IN DOING THINGS: NOT AT ALL

## 2025-01-02 ASSESSMENT — PAIN SCALES - GENERAL: PAINLEVEL_OUTOF10: 0-NO PAIN

## 2025-01-03 NOTE — ASSESSMENT & PLAN NOTE
Chronic.  Good sleep hygiene discussed.  Ambien as needed.  Indications, benefits and potential side effects discussed.    Orders:    zolpidem (Ambien) 5 mg tablet; Take 1 tablet (5 mg) by mouth as needed at bedtime for sleep.

## 2025-01-09 ENCOUNTER — HOSPITAL ENCOUNTER (OUTPATIENT)
Dept: GASTROENTEROLOGY | Facility: HOSPITAL | Age: 60
Discharge: HOME | End: 2025-01-09
Payer: MEDICARE

## 2025-01-09 ENCOUNTER — ANESTHESIA (OUTPATIENT)
Dept: GASTROENTEROLOGY | Facility: HOSPITAL | Age: 60
End: 2025-01-09
Payer: MEDICARE

## 2025-01-09 ENCOUNTER — ANESTHESIA EVENT (OUTPATIENT)
Dept: GASTROENTEROLOGY | Facility: HOSPITAL | Age: 60
End: 2025-01-09
Payer: MEDICARE

## 2025-01-09 VITALS
TEMPERATURE: 96.8 F | WEIGHT: 200.62 LBS | SYSTOLIC BLOOD PRESSURE: 119 MMHG | OXYGEN SATURATION: 99 % | HEART RATE: 75 BPM | HEIGHT: 65 IN | BODY MASS INDEX: 33.43 KG/M2 | RESPIRATION RATE: 18 BRPM | DIASTOLIC BLOOD PRESSURE: 64 MMHG

## 2025-01-09 DIAGNOSIS — C20 RECTAL CANCER (MULTI): Primary | ICD-10-CM

## 2025-01-09 PROCEDURE — 45378 DIAGNOSTIC COLONOSCOPY: CPT | Performed by: COLON & RECTAL SURGERY

## 2025-01-09 PROCEDURE — A45378 PR COLONOSCOPY,DIAGNOSTIC: Performed by: ANESTHESIOLOGY

## 2025-01-09 PROCEDURE — 7100000010 HC PHASE TWO TIME - EACH INCREMENTAL 1 MINUTE

## 2025-01-09 PROCEDURE — G0105 COLORECTAL SCRN; HI RISK IND: HCPCS | Performed by: COLON & RECTAL SURGERY

## 2025-01-09 PROCEDURE — A45378 PR COLONOSCOPY,DIAGNOSTIC: Performed by: ANESTHESIOLOGIST ASSISTANT

## 2025-01-09 PROCEDURE — 7100000009 HC PHASE TWO TIME - INITIAL BASE CHARGE

## 2025-01-09 PROCEDURE — 3700000001 HC GENERAL ANESTHESIA TIME - INITIAL BASE CHARGE

## 2025-01-09 PROCEDURE — 2500000004 HC RX 250 GENERAL PHARMACY W/ HCPCS (ALT 636 FOR OP/ED): Performed by: ANESTHESIOLOGIST ASSISTANT

## 2025-01-09 PROCEDURE — 3700000002 HC GENERAL ANESTHESIA TIME - EACH INCREMENTAL 1 MINUTE

## 2025-01-09 RX ORDER — PHENYLEPHRINE HCL IN 0.9% NACL 1 MG/10 ML
SYRINGE (ML) INTRAVENOUS AS NEEDED
Status: DISCONTINUED | OUTPATIENT
Start: 2025-01-09 | End: 2025-01-09

## 2025-01-09 RX ORDER — FENTANYL CITRATE 50 UG/ML
INJECTION, SOLUTION INTRAMUSCULAR; INTRAVENOUS AS NEEDED
Status: DISCONTINUED | OUTPATIENT
Start: 2025-01-09 | End: 2025-01-09

## 2025-01-09 RX ORDER — PROPOFOL 10 MG/ML
INJECTION, EMULSION INTRAVENOUS CONTINUOUS PRN
Status: DISCONTINUED | OUTPATIENT
Start: 2025-01-09 | End: 2025-01-09

## 2025-01-09 RX ORDER — MIDAZOLAM HYDROCHLORIDE 1 MG/ML
INJECTION INTRAMUSCULAR; INTRAVENOUS AS NEEDED
Status: DISCONTINUED | OUTPATIENT
Start: 2025-01-09 | End: 2025-01-09

## 2025-01-09 RX ADMIN — PROPOFOL 300 MCG/KG/MIN: 10 INJECTION, EMULSION INTRAVENOUS at 15:22

## 2025-01-09 RX ADMIN — FENTANYL CITRATE 25 MCG: 50 INJECTION, SOLUTION INTRAMUSCULAR; INTRAVENOUS at 15:36

## 2025-01-09 RX ADMIN — Medication 200 MCG: at 15:32

## 2025-01-09 RX ADMIN — Medication 100 MCG: at 15:30

## 2025-01-09 RX ADMIN — MIDAZOLAM HYDROCHLORIDE 2 MG: 1 INJECTION, SOLUTION INTRAMUSCULAR; INTRAVENOUS at 15:12

## 2025-01-09 RX ADMIN — Medication 200 MCG: at 15:36

## 2025-01-09 RX ADMIN — FENTANYL CITRATE 50 MCG: 50 INJECTION, SOLUTION INTRAMUSCULAR; INTRAVENOUS at 15:22

## 2025-01-09 RX ADMIN — FENTANYL CITRATE 25 MCG: 50 INJECTION, SOLUTION INTRAMUSCULAR; INTRAVENOUS at 15:29

## 2025-01-09 ASSESSMENT — PAIN - FUNCTIONAL ASSESSMENT
PAIN_FUNCTIONAL_ASSESSMENT: 0-10

## 2025-01-09 ASSESSMENT — PAIN SCALES - GENERAL
PAINLEVEL_OUTOF10: 0 - NO PAIN

## 2025-01-09 NOTE — LETTER
[phone]      Dear Ms. Carranza,        It was my pleasure to see you at your recent colonoscopy.  At that time,  your examination was completely normal.  The current recommendation is to repeat the colonoscopy in 3 years.     Thank you very much for allowing me to take part in your care, please feel free to contact me with any questions or concerns at [phone].          Sincerely,       Bryanna Presley M.D. FACS, FASCRS    CC:  Primary Care:

## 2025-01-09 NOTE — H&P
History Of Present Illness  Tracey Carranza is a 59 y.o. female presenting with Hx of T1N0 rectal cancer.     Past Medical History  Past Medical History:   Diagnosis Date    Angulo esophagus     HLD (hyperlipidemia)     Hypothyroidism     Rectal adenocarcinoma (Multi)     Rheumatoid arthritis     [place] tumor     removed     Surgical History  Past Surgical History:   Procedure Laterality Date    BACK SURGERY      Metronic device in back then removed 17 months later    CERVICAL SPINE SURGERY      COLON SURGERY  02/06/2024    colon and rectal resection w/ ileostomy, right oophrectomy    COLONOSCOPY  11/2023    MOUTH SURGERY      x2    NASAL SEPTUM SURGERY      PLANTAR FASCIA SURGERY Left     SALIVARY GLAND SURGERY Left     Left parotidectomy with facial nerve  dissection and preservation    SHOULDER SURGERY Right      Social History  She reports that she has been smoking cigarettes. She started smoking about 40 years ago. She has a 40.3 pack-year smoking history. She has never been exposed to tobacco smoke. She has never used smokeless tobacco. She reports that she does not currently use alcohol. She reports that she does not use drugs.    Family History  Family History   Problem Relation Name Age of Onset    Rheum arthritis Mother      Dementia Mother      Diabetes Mother      Transient ischemic attack Mother      Hypertension Mother      Heart failure Father      Cirrhosis Father          no ETOH    Stroke Father      Tuberculosis Father      Rheum arthritis Sister      Anemia Sister      Transient ischemic attack Sister      Hypertension Sister      Breast cancer Mother's Sister      Stomach cancer Father's Brother      Diabetes Maternal Grandmother      Stomach cancer Maternal Grandfather          Allergies  Allergies   Allergen Reactions    Hydroxychloroquine Hives     Review of Systems     Physical Exam  Constitutional: Well developed, awake/alert/oriented x3, no distress, alert and cooperative   CV:   RRR  Lungs:  No audible wheezing, no tachypnea, chest symmetric, no increased WOB  Gastrointestinal: soft,  nontender  Neurological: alert and oriented      Lab Results   Component Value Date    CEA 0.9 12/29/2023       Last Recorded Vitals  There were no vitals taken for this visit.    Assessment/Plan   Colonoscopy     Bryanna Presley MD

## 2025-01-09 NOTE — DISCHARGE INSTRUCTIONS
Patient Instructions after a Colonoscopy      The anesthetics, sedatives or narcotics which were given to you today will be acting in your body for the next 24 hours, so you might feel a little sleepy or groggy.  This feeling should slowly wear off. Carefully read and follow the instructions.     You received sedation today:  - Do not drive or operate any machinery or power tools of any kind.   - No alcoholic beverages today, not even beer or wine.  - Do not make any important decisions or sign any legal documents.  - No over the counter medications that contain alcohol or that may cause drowsiness.  - Do not make any important decisions or sign any legal documents.  - Make sure you have someone with you for first 24 hours.    While it is common to experience mild to moderate abdominal distention, gas, or belching after your procedure, if any of these symptoms occur following discharge from the GI Lab or within one week of having your procedure, call the [facility] to be advised whether a visit to your nearest Urgent Care or Emergency Department is indicated.  Take this paper with you if you go.     - If you develop an allergic reaction to the medications that were given during your procedure such as difficulty breathing, rash, hives, severe nausea, vomiting or lightheadedness.  - If you experience chest pain, shortness of breath, severe abdominal pain, fevers and chills.  -If you develop signs and symptoms of bleeding such as blood in your spit, if your stools turn black, tarry, or bloody  - If you have not urinated within 8 hours following your procedure.  - If your IV site becomes painful, red, inflamed, or looks infected.    If you received a biopsy/polypectomy/sphincterotomy the following instructions apply below:    __ Do not use Aspirin containing products, non-steroidal medications or anti-coagulants for one week following your procedure. (Examples of these types of medications are: Advil,  Arthrotec, Aleve, Coumadin, Ecotrin, Heparin, Ibuprofen, Indocin, Motrin, Naprosyn, Nuprin, Plavix, Vioxx, and Voltarin, or their generic forms.  This list is not all-inclusive.  Check with your physician or pharmacist before resuming medications.)   __ Eat a soft diet today.  Avoid foods that are poorly digested for the next 24 hours.  These foods would include: nuts, beans, lettuce, red meats, and fried foods. Start with liquids and advance your diet as tolerated, gradually work up to eating solids.   __ Do not have a Barium Study or Enema for one week.    Your physician recommends the additional following instructions:    -You have a contact number available for emergencies. The signs and symptoms of potential delayed complications were discussed with you. You may return to normal activities tomorrow.  -Resume your previous diet.  -Continue your present medications.   -We are waiting for your pathology results.  -Your physician has recommended a repeat colonoscopy (date to be determined after pending pathology results are reviewed) for surveillance based on pathology results.  -The findings and recommendations have been discussed with you.  -The findings and recommendations were discussed with your family.  - Please see Medication Reconciliation Form for new medication/medications prescribed.       If you experience any problems or have any questions following discharge from the GI Lab, please call:        Nurse Signature                                                                        Date___________________                                                                            Patient/Responsible Party Signature                                        Date___________________

## 2025-01-09 NOTE — SIGNIFICANT EVENT
Patient arrived to [place] after procedure with Anesthesia and procedure RN, procedure discussed, plan reviewed, VSS

## 2025-01-09 NOTE — ANESTHESIA PREPROCEDURE EVALUATION
Patient: Tracey Carranza    Procedure Information       Date/Time: 01/09/25 1340    Scheduled providers: Bryanna Presley MD; Jluis Wheat MD; BETTYE Goldberg    Procedure: COLONOSCOPY    Location: [facility] [place]            Relevant Problems   Cardiac   (+) Essential hypertension   (+) Pure hypercholesterolemia      Neuro   (+) Anxiety   (+) Depression      GI   (+) Esophageal reflux   (+) Rectal adenocarcinoma (Multi)      Liver   (+) Rectal adenocarcinoma (Multi)      Endocrine   (+) Hypothyroid   (+) Obesity due to energy imbalance      Musculoskeletal   (+) Lumbosacral spondylosis without myelopathy   (+) Osteoarthritis   (+) RA (rheumatoid arthritis)      ID   (+) Cold sore       Clinical information reviewed:   Tobacco  Allergies  Meds   Med Hx  Surg Hx  OB Status  Fam Hx  Soc   Hx         Past Medical History:   Diagnosis Date    Angulo esophagus     HLD (hyperlipidemia)     HTN (hypertension)     Hypothyroidism     Rectal adenocarcinoma (Multi)     Rheumatoid arthritis     [place] tumor     removed      Past Surgical History:   Procedure Laterality Date    BACK SURGERY      Metronic device in back then removed 17 months later    CERVICAL FUSION      COLON SURGERY  02/06/2024    colon and rectal resection w/ ileostomy, right oophrectomy    COLONOSCOPY  11/2023    ILEOSTOMY CLOSURE  04/09/2024    MOUTH SURGERY      x2    NASAL SEPTUM SURGERY      PLANTAR FASCIA SURGERY Left     SALIVARY GLAND SURGERY Left 01/12/2023    Left parotidectomy with facial nerve  dissection and preservation    SHOULDER SURGERY Right     SPINAL CORD STIMULATOR IMPLANT      SPINAL CORD STIMULATOR REMOVAL       Social History     Tobacco Use    Smoking status: Every Day     Current packs/day: 0.50     Average packs/day: 1 pack/day for 40.3 years (39.8 ttl pk-yrs)     Types: Cigarettes     Start date: 10/3/1984     Passive exposure: Never    Smokeless tobacco: Never    Tobacco comments:     Actively trying to quit   "  Vaping Use    Vaping status: Never Used   Substance Use Topics    Alcohol use: Never     Comment: Sober since 02/01/2024    Drug use: Never      Current Outpatient Medications   Medication Instructions    atorvastatin (LIPITOR) 40 mg, oral, Daily    cholecalciferol (VITAMIN D3) 2,000 Units, oral, Daily    folic acid (Folvite) 1 mg tablet 1 tablet, Daily    levothyroxine (SYNTHROID, LEVOXYL) 88 mcg, oral, Daily before breakfast    methotrexate (Trexall) 2.5 mg tablet Take 1 tablet (2.5 mg total) by mouth 1 (one) time per week.  Saturdays-on hold until after 4/9/24 surgery    pantoprazole (PROTONIX) 40 mg, oral, Daily    pregabalin (Lyrica) 75 mg capsule 1 capsule, Every 12 hours scheduled (0630,1830)    zolpidem (AMBIEN) 5 mg, oral, Nightly PRN      Allergies   Allergen Reactions    Hydroxychloroquine Hives        Chemistry    Lab Results   Component Value Date/Time     10/03/2024 1017    K 4.3 10/03/2024 1017     10/03/2024 1017    CO2 27 10/03/2024 1017    BUN 9 10/03/2024 1017    CREATININE 0.83 10/03/2024 1017    Lab Results   Component Value Date/Time    CALCIUM 9.4 10/03/2024 1017    ALKPHOS 102 10/03/2024 1017    AST 13 10/03/2024 1017    ALT 13 10/03/2024 1017    BILITOT 0.4 10/03/2024 1017          Lab Results   Component Value Date    HGBA1C 5.8 (H) 10/03/2024     Lab Results   Component Value Date/Time    WBC 7.3 10/03/2024 1017    HGB 12.2 10/03/2024 1017    HCT 38.6 10/03/2024 1017     10/03/2024 1017     No results found for: \"PROTIME\", \"PTT\", \"INR\"  No results found for: \"ABORH\"  No results found for this or any previous visit (from the past 4464 hours).  No results found for this or any previous visit from the past 1095 days.       Visit Vitals  /79   Pulse 93   Temp 36.4 °C (97.5 °F) (Temporal)   Resp 18   Ht 1.651 m (5' 5\")   Wt 91 kg (200 lb 9.9 oz)   SpO2 97%   BMI 33.38 kg/m²   OB Status Postmenopausal   Smoking Status Every Day   BSA 2.04 m²     NPO/Void " Status  Carbohydrate Drink Given Prior to Surgery? : N  Date of Last Liquid: 01/09/25  Time of Last Liquid: 0930  Date of Last Solid: 01/07/25  Time of Last Solid: 1900  Last Intake Type: Clear fluids; GI prep  Time of Last Void: 1245        Physical Exam    Airway  Mallampati: III  TM distance: >3 FB  Neck ROM: full     Cardiovascular - normal exam  Rhythm: regular  Rate: normal     Dental    Pulmonary - normal exam     Abdominal - normal exam             Anesthesia Plan    History of general anesthesia?: yes  History of complications of general anesthesia?: no    ASA 3     MAC   (Standard ASA monitoring.)  intravenous induction   Anesthetic plan and risks discussed with patient.    Plan discussed with CRNA and CAA.

## 2025-01-10 ASSESSMENT — PAIN SCALES - GENERAL: PAINLEVEL_OUTOF10: 0 - NO PAIN

## 2025-01-10 NOTE — ANESTHESIA POSTPROCEDURE EVALUATION
Patient: Tracey Carranza    Procedure Summary       Date: 01/09/25 Room / Location: [facility]    Anesthesia Start: 1509 Anesthesia Stop: 1544    Procedure: COLONOSCOPY Diagnosis: Rectal cancer (Multi)    Scheduled Providers: Bryanna Presley MD; Jluis Wheat MD; BETTYE Goldberg Responsible Provider: Jluis Wheat MD    Anesthesia Type: MAC ASA Status: 3            Anesthesia Type: MAC    Vitals Value Taken Time   /64 01/09/25 1612   Temp 36 °C (96.8 °F) 01/09/25 1540   Pulse 74 01/09/25 1612   Resp 18 01/09/25 1610   SpO2 100 % 01/09/25 1612   Vitals shown include unfiled device data.    Anesthesia Post Evaluation    Patient location during evaluation: PACU  Patient participation: complete - patient participated  Level of consciousness: awake and alert  Pain management: adequate  Airway patency: patent  Cardiovascular status: acceptable and hemodynamically stable  Respiratory status: acceptable, spontaneous ventilation and nonlabored ventilation  Hydration status: acceptable  Postoperative Nausea and Vomiting: none        No notable events documented.

## 2025-02-10 ENCOUNTER — TELEPHONE (OUTPATIENT)
Dept: PRIMARY CARE | Facility: CLINIC | Age: 60
End: 2025-02-10
Payer: MEDICARE

## 2025-03-19 DIAGNOSIS — Z12.31 ENCOUNTER FOR SCREENING MAMMOGRAM FOR MALIGNANT NEOPLASM OF BREAST: ICD-10-CM

## 2025-04-10 ENCOUNTER — TELEPHONE (OUTPATIENT)
Dept: PRIMARY CARE | Facility: CLINIC | Age: 60
End: 2025-04-10
Payer: MEDICARE

## 2025-04-10 DIAGNOSIS — F51.01 PRIMARY INSOMNIA: ICD-10-CM

## 2025-04-10 RX ORDER — ZOLPIDEM TARTRATE 5 MG/1
5 TABLET ORAL NIGHTLY PRN
Qty: 45 TABLET | Refills: 0 | Status: SHIPPED | OUTPATIENT
Start: 2025-04-10 | End: 2025-07-09

## 2025-06-02 ENCOUNTER — APPOINTMENT (OUTPATIENT)
Dept: PRIMARY CARE | Facility: CLINIC | Age: 60
End: 2025-06-02
Payer: MEDICARE

## 2025-06-02 ENCOUNTER — TELEPHONE (OUTPATIENT)
Dept: PRIMARY CARE | Facility: CLINIC | Age: 60
End: 2025-06-02

## 2025-06-02 VITALS
RESPIRATION RATE: 18 BRPM | OXYGEN SATURATION: 99 % | HEART RATE: 76 BPM | BODY MASS INDEX: 33.78 KG/M2 | DIASTOLIC BLOOD PRESSURE: 76 MMHG | SYSTOLIC BLOOD PRESSURE: 122 MMHG | WEIGHT: 203 LBS

## 2025-06-02 DIAGNOSIS — E78.00 PURE HYPERCHOLESTEROLEMIA: ICD-10-CM

## 2025-06-02 DIAGNOSIS — I10 ESSENTIAL HYPERTENSION: ICD-10-CM

## 2025-06-02 DIAGNOSIS — E55.9 VITAMIN D DEFICIENCY: ICD-10-CM

## 2025-06-02 DIAGNOSIS — F33.1 MODERATE EPISODE OF RECURRENT MAJOR DEPRESSIVE DISORDER: ICD-10-CM

## 2025-06-02 DIAGNOSIS — F51.01 PRIMARY INSOMNIA: Primary | ICD-10-CM

## 2025-06-02 DIAGNOSIS — R73.02 IMPAIRED GLUCOSE TOLERANCE: ICD-10-CM

## 2025-06-02 DIAGNOSIS — E03.9 ACQUIRED HYPOTHYROIDISM: ICD-10-CM

## 2025-06-02 PROCEDURE — G2211 COMPLEX E/M VISIT ADD ON: HCPCS | Performed by: FAMILY MEDICINE

## 2025-06-02 PROCEDURE — 3078F DIAST BP <80 MM HG: CPT | Performed by: FAMILY MEDICINE

## 2025-06-02 PROCEDURE — 99214 OFFICE O/P EST MOD 30 MIN: CPT | Performed by: FAMILY MEDICINE

## 2025-06-02 PROCEDURE — 3074F SYST BP LT 130 MM HG: CPT | Performed by: FAMILY MEDICINE

## 2025-06-02 RX ORDER — ESCITALOPRAM OXALATE 10 MG/1
10 TABLET ORAL DAILY
Qty: 30 TABLET | Refills: 1 | Status: SHIPPED | OUTPATIENT
Start: 2025-06-02 | End: 2025-08-01

## 2025-06-02 ASSESSMENT — COLUMBIA-SUICIDE SEVERITY RATING SCALE - C-SSRS: 1. IN THE PAST MONTH, HAVE YOU WISHED YOU WERE DEAD OR WISHED YOU COULD GO TO SLEEP AND NOT WAKE UP?: NO

## 2025-06-02 ASSESSMENT — ANXIETY QUESTIONNAIRES
GAD7 TOTAL SCORE: 12
IF YOU CHECKED OFF ANY PROBLEMS ON THIS QUESTIONNAIRE, HOW DIFFICULT HAVE THESE PROBLEMS MADE IT FOR YOU TO DO YOUR WORK, TAKE CARE OF THINGS AT HOME, OR GET ALONG WITH OTHER PEOPLE: VERY DIFFICULT
5. BEING SO RESTLESS THAT IT IS HARD TO SIT STILL: NOT AT ALL
2. NOT BEING ABLE TO STOP OR CONTROL WORRYING: NEARLY EVERY DAY
7. FEELING AFRAID AS IF SOMETHING AWFUL MIGHT HAPPEN: SEVERAL DAYS
4. TROUBLE RELAXING: MORE THAN HALF THE DAYS
6. BECOMING EASILY ANNOYED OR IRRITABLE: NOT AT ALL
3. WORRYING TOO MUCH ABOUT DIFFERENT THINGS: NEARLY EVERY DAY
1. FEELING NERVOUS, ANXIOUS, OR ON EDGE: NEARLY EVERY DAY

## 2025-06-02 ASSESSMENT — PATIENT HEALTH QUESTIONNAIRE - PHQ9
SUM OF ALL RESPONSES TO PHQ9 QUESTIONS 1 AND 2: 6
SUM OF ALL RESPONSES TO PHQ QUESTIONS 1-9: 18
9. THOUGHTS THAT YOU WOULD BE BETTER OFF DEAD, OR OF HURTING YOURSELF: NOT AT ALL
7. TROUBLE CONCENTRATING ON THINGS, SUCH AS READING THE NEWSPAPER OR WATCHING TELEVISION: NEARLY EVERY DAY
5. POOR APPETITE OR OVEREATING: NOT AT ALL
3. TROUBLE FALLING OR STAYING ASLEEP OR SLEEPING TOO MUCH: NEARLY EVERY DAY
1. LITTLE INTEREST OR PLEASURE IN DOING THINGS: NEARLY EVERY DAY
8. MOVING OR SPEAKING SO SLOWLY THAT OTHER PEOPLE COULD HAVE NOTICED. OR THE OPPOSITE, BEING SO FIGETY OR RESTLESS THAT YOU HAVE BEEN MOVING AROUND A LOT MORE THAN USUAL: NOT AT ALL
2. FEELING DOWN, DEPRESSED OR HOPELESS: NEARLY EVERY DAY
6. FEELING BAD ABOUT YOURSELF - OR THAT YOU ARE A FAILURE OR HAVE LET YOURSELF OR YOUR FAMILY DOWN: NEARLY EVERY DAY
4. FEELING TIRED OR HAVING LITTLE ENERGY: NEARLY EVERY DAY

## 2025-06-02 ASSESSMENT — PAIN SCALES - GENERAL: PAINLEVEL_OUTOF10: 0-NO PAIN

## 2025-06-02 NOTE — PROGRESS NOTES
Subjective     Patient ID: Tracey Carranza is a 59 y.o. female who presents for Follow-up (Pt is to follow up on Ambien, pt states medication has not been effective as stress has increased recently).    HPI  Tracey Carranza is seen for her chronic issues.    Insomnia -she tried mirtazapine, Elavil and trazodone in the past without relief.  At her last visit in October she was placed on Ambien to take only as needed.  Advised to use only when she has not slept for 2-3 nights in a row. Still has some left from original prescription.  Not using it more than 2-3 times per week and only when she needs it. Increased stress and thus ambien is not as effective as it was in the past. Mom is in memory unit. Dog has some health issues waxing and waning so struggling with taking care of him also.   [place]AR[place]:  Vickie Sweet MD on 6/2/2025 10:37 AM  I have personally reviewed the [place]ARRS report for Tracey Carranza. I have considered the risks of abuse, dependence, addiction and diversion    Controlled Substance Agreement:  Date of the Last Agreement: 1/2/24; Reviewed 1/2/25    Sleep Aids:   What is the patient's goal of therapy? Improved sleep  Is this being achieved with current treatment? sometimes    Activities of Daily Living:   Is your overall impression that this patient is benefiting (symptom reduction outweighs side effects) from sleep aid therapy? Yes       Review of Systems  All other systems have been reviewed and are negative except as noted in the HPI.       Objective  Vitals:  /76 (BP Location: Left arm, Patient Position: Sitting)   Pulse 76   Resp 18   Wt 92.1 kg (203 lb)   SpO2 99%   BMI 33.78 kg/m²     Physical Exam  Alert. NAD. Judgement and insight intact. Mood is anxious.     Assessment/Plan   Assessment & Plan  Primary insomnia  Slightly worse due to anxiety from situation.  Self relaxation. Continue with ambien as needed.       Moderate episode of recurrent major depressive disorder  Begin  lexapro as directed. Indications, benefits and potential side effects discussed.  Orders:    escitalopram (Lexapro) 10 mg tablet; Take 1 tablet (10 mg) by mouth once daily.      No orders of the defined types were placed in this encounter.    Follow up 6 Months, sooner with any problems or concerns.

## 2025-06-02 NOTE — ASSESSMENT & PLAN NOTE
Slightly worse due to anxiety from situation.  Self relaxation. Continue with ambien as needed.

## 2025-06-02 NOTE — ASSESSMENT & PLAN NOTE
Begin lexapro as directed. Indications, benefits and potential side effects discussed.  Orders:    escitalopram (Lexapro) 10 mg tablet; Take 1 tablet (10 mg) by mouth once daily.

## 2025-07-15 ENCOUNTER — TELEPHONE (OUTPATIENT)
Dept: PRIMARY CARE | Facility: CLINIC | Age: 60
End: 2025-07-15
Payer: MEDICARE

## 2025-07-15 DIAGNOSIS — F51.01 PRIMARY INSOMNIA: ICD-10-CM

## 2025-07-15 RX ORDER — ZOLPIDEM TARTRATE 5 MG/1
5 TABLET ORAL NIGHTLY PRN
Qty: 45 TABLET | Refills: 0 | Status: SHIPPED | OUTPATIENT
Start: 2025-07-15 | End: 2025-10-13

## 2025-08-04 DIAGNOSIS — F33.1 MODERATE EPISODE OF RECURRENT MAJOR DEPRESSIVE DISORDER: ICD-10-CM

## 2025-08-04 RX ORDER — ESCITALOPRAM OXALATE 10 MG/1
10 TABLET ORAL DAILY
Qty: 30 TABLET | Refills: 0 | Status: SHIPPED | OUTPATIENT
Start: 2025-08-04

## 2025-09-02 DIAGNOSIS — F33.1 MODERATE EPISODE OF RECURRENT MAJOR DEPRESSIVE DISORDER: ICD-10-CM

## 2025-09-03 RX ORDER — ESCITALOPRAM OXALATE 10 MG/1
10 TABLET ORAL DAILY
Qty: 90 TABLET | Refills: 0 | Status: SHIPPED | OUTPATIENT
Start: 2025-09-03

## 2025-10-06 ENCOUNTER — APPOINTMENT (OUTPATIENT)
Dept: PRIMARY CARE | Facility: CLINIC | Age: 60
End: 2025-10-06
Payer: MEDICARE

## (undated) DEVICE — COUNTER, NEEDLE, FOAM BLOCK, POP-N-COUNT, W/BLADEGUARD, W/ADHESIVE 40 COUNT, RED

## (undated) DEVICE — CANNULA SEAL, STAPLER, DAVINCI XI

## (undated) DEVICE — OSTOMY KIT, POSTOPERATIVE, COLOSTOMY/ILEOSTOMY, LOOP, 1.75 IN FLANGE, STERILE

## (undated) DEVICE — NERVE BLOCK, STIMUQUIK, SINGLE-SHOT, 21GA X 3-1/2

## (undated) DEVICE — SUTURE, VICRYL, 2-0, 27 IN, SH, UNDYED

## (undated) DEVICE — SEAL, COHESIVE, EAKIN, SMALL, 2 IN

## (undated) DEVICE — CLIP, LIGATING, HEM-O-LOCK, MLX, LARGE, LF, PURPLE

## (undated) DEVICE — APPLIER, CLIP LARGE XI

## (undated) DEVICE — STAPLER,  ECHELON CIRCULAR POWERED, 29MM, DISP

## (undated) DEVICE — SUTURE, PROLENE, 0, 30 IN, SH

## (undated) DEVICE — SUTURE, VICRYL, 3-0, 27 IN, SH

## (undated) DEVICE — DRAPE, SHEET, UTILITY, NON ABSORBENT, 18 X 26 IN, LF

## (undated) DEVICE — OBTURATOR, BLADELESS , SU

## (undated) DEVICE — BLADE, ULTRA CLEAN, BOVIE MOD TIP, 1IN

## (undated) DEVICE — EVACUATOR, WOUND, SUCTION, CLOSED, JACKSON-PRATT, 100 CC, SILICONE

## (undated) DEVICE — CUTTER,  LINEAR RELOAD, THICK TISSUE, 75MM, GREEN

## (undated) DEVICE — PUMP, STRYKERFLOW 2 & HANDPIECE W/10FT. IRRIGATION TUBING

## (undated) DEVICE — COVER, TIP HOT SHEARS ENDOWRIST

## (undated) DEVICE — STAY SET, SURGICAL, 5MM SHARP HOOK, 8PK

## (undated) DEVICE — SUTURE, PDS II, 2-0, 27 IN, CT-1 VIL MONO, LF

## (undated) DEVICE — TRAY, FOLEY, LUBRI-SIL, 16FR, COMPLETE CARE W/STATLOCK

## (undated) DEVICE — RELOAD, SUREFORM 45, 4.3 GREEN

## (undated) DEVICE — CARE KIT, LAPAROSCOPIC, ADVANCED

## (undated) DEVICE — Device

## (undated) DEVICE — RELOAD, SUREFORM 45, 3.5 BLUE

## (undated) DEVICE — DRAIN, CHANNEL, HUBLESS, ROUND, FULL FLUTE, 19FR

## (undated) DEVICE — DRAIN, PENROSE, 1 IN X 18 IN, STERILE, LF

## (undated) DEVICE — SOLUTION, IRRIGATION, STERILE WATER, 1000 ML, POUR BOTTLE

## (undated) DEVICE — TUBING, SUCTION, CONNECTING, NON-CONDUCTIVE, FEMALE, 5 X 3.7 MM

## (undated) DEVICE — ELECTRODE, ELECTROSURGICAL, BLADE, NONSTICK, MODIFIED, 6.5 IN X 165 MM

## (undated) DEVICE — SUTURE, MONOCRYL, 4-0, 18 IN, PS2, UNDYED

## (undated) DEVICE — TAPE, UMBILICAL, 1/8 X 30 IN, MULTIPACK, COTTON, WHITE

## (undated) DEVICE — SEAL, UNIVERSAL 5-8MM  XI

## (undated) DEVICE — SUTURE, PDS II, 0, 36 IN, CT, VIOLET

## (undated) DEVICE — GOWN, SURGICAL, SMARTGOWN, XLARGE, STERILE

## (undated) DEVICE — DRAPE, ARM XI

## (undated) DEVICE — SYRINGE, 12 CC, LUER LOCK, CONTROL, MONOJECT

## (undated) DEVICE — SYSTEM, TROCAR LAP, 5X100MM, SHIELD BLADED, KII ADVANCED FIX ABDOMINAL

## (undated) DEVICE — SUTURE, VICRYL 0, 36 IN, CT-1, VIOLET

## (undated) DEVICE — GLOVE, SURGICAL, PROTEXIS PI BLUE W/NEUTHERA, 6.5, PF, LF

## (undated) DEVICE — STAPLER, SUREFORM 45, DAVINCI XI

## (undated) DEVICE — CUTTER, PROX LINEAR, 75MM, REG TISSUE, W/ SAFETY LOCK OUT

## (undated) DEVICE — GLOVE, SURGICAL, PROTEXIS MICRO, 6.5, PF, LATEX

## (undated) DEVICE — DRAPE, UNDERBUTTOCKS, W / 27IN FLUID POUCH

## (undated) DEVICE — DRAPE, COLUMN, DAVINCI XI

## (undated) DEVICE — GOWN, ASTOUND, XL

## (undated) DEVICE — TUBE SET, PNEUMOLAR HEATED, SMOKE EVACU, HIGH-FLOW

## (undated) DEVICE — LIGASURE, V SEALER/DIVIDER  5MM BLUNT TIP

## (undated) DEVICE — SUTURE, ETHIBOND XTRA, 3-0, 30 IN, SH

## (undated) DEVICE — DRAPE, SHEET, ENDOSCOPY, GENERAL, FENESTRATED, ARMBOARD COVER, 98 X 123.5 IN, DISPOSABLE, LF, STERILE

## (undated) DEVICE — CAUTERY, PENCIL, PUSH BUTTON, SMOKE EVAC, 70MM

## (undated) DEVICE — CANNULA REDUCER, DAVINCI XI

## (undated) DEVICE — PAD, GROUNDING, ELECTROSURGICAL, W/9 FT CABLE, POLYHESIVE II, ADULT, LF